# Patient Record
Sex: MALE | Race: WHITE | NOT HISPANIC OR LATINO | URBAN - METROPOLITAN AREA
[De-identification: names, ages, dates, MRNs, and addresses within clinical notes are randomized per-mention and may not be internally consistent; named-entity substitution may affect disease eponyms.]

---

## 2017-05-08 ENCOUNTER — OUTPATIENT (OUTPATIENT)
Dept: OUTPATIENT SERVICES | Facility: HOSPITAL | Age: 82
LOS: 1 days | Discharge: HOME | End: 2017-05-08

## 2017-06-28 DIAGNOSIS — N18.3 CHRONIC KIDNEY DISEASE, STAGE 3 (MODERATE): ICD-10-CM

## 2017-06-28 DIAGNOSIS — I10 ESSENTIAL (PRIMARY) HYPERTENSION: ICD-10-CM

## 2017-06-28 DIAGNOSIS — I25.10 ATHEROSCLEROTIC HEART DISEASE OF NATIVE CORONARY ARTERY WITHOUT ANGINA PECTORIS: ICD-10-CM

## 2017-12-04 ENCOUNTER — OUTPATIENT (OUTPATIENT)
Dept: OUTPATIENT SERVICES | Facility: HOSPITAL | Age: 82
LOS: 1 days | Discharge: HOME | End: 2017-12-04

## 2017-12-04 DIAGNOSIS — N18.3 CHRONIC KIDNEY DISEASE, STAGE 3 (MODERATE): ICD-10-CM

## 2017-12-04 DIAGNOSIS — I10 ESSENTIAL (PRIMARY) HYPERTENSION: ICD-10-CM

## 2017-12-04 DIAGNOSIS — I25.10 ATHEROSCLEROTIC HEART DISEASE OF NATIVE CORONARY ARTERY WITHOUT ANGINA PECTORIS: ICD-10-CM

## 2018-03-05 ENCOUNTER — OUTPATIENT (OUTPATIENT)
Dept: OUTPATIENT SERVICES | Facility: HOSPITAL | Age: 83
LOS: 1 days | Discharge: HOME | End: 2018-03-05

## 2018-03-05 DIAGNOSIS — I10 ESSENTIAL (PRIMARY) HYPERTENSION: ICD-10-CM

## 2018-03-10 ENCOUNTER — EMERGENCY (EMERGENCY)
Facility: HOSPITAL | Age: 83
LOS: 0 days | Discharge: HOME | End: 2018-03-10
Attending: EMERGENCY MEDICINE | Admitting: INTERNAL MEDICINE

## 2018-03-10 VITALS
RESPIRATION RATE: 20 BRPM | DIASTOLIC BLOOD PRESSURE: 70 MMHG | OXYGEN SATURATION: 100 % | TEMPERATURE: 96 F | HEART RATE: 69 BPM | SYSTOLIC BLOOD PRESSURE: 153 MMHG

## 2018-03-10 VITALS
RESPIRATION RATE: 18 BRPM | SYSTOLIC BLOOD PRESSURE: 143 MMHG | HEART RATE: 67 BPM | TEMPERATURE: 97 F | DIASTOLIC BLOOD PRESSURE: 65 MMHG | OXYGEN SATURATION: 99 %

## 2018-03-10 DIAGNOSIS — M25.551 PAIN IN RIGHT HIP: ICD-10-CM
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
## 2018-03-10 DIAGNOSIS — Z95.1 PRESENCE OF AORTOCORONARY BYPASS GRAFT: ICD-10-CM
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
## 2018-03-10 DIAGNOSIS — Z79.02 LONG TERM (CURRENT) USE OF ANTITHROMBOTICS/ANTIPLATELETS: ICD-10-CM

## 2018-03-10 DIAGNOSIS — I25.10 ATHEROSCLEROTIC HEART DISEASE OF NATIVE CORONARY ARTERY WITHOUT ANGINA PECTORIS: ICD-10-CM
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
## 2018-03-10 DIAGNOSIS — S20.211A CONTUSION OF RIGHT FRONT WALL OF THORAX, INITIAL ENCOUNTER: ICD-10-CM

## 2018-03-10 DIAGNOSIS — I10 ESSENTIAL (PRIMARY) HYPERTENSION: ICD-10-CM

## 2018-03-10 DIAGNOSIS — M25.519 PAIN IN UNSPECIFIED SHOULDER: ICD-10-CM

## 2018-03-10 DIAGNOSIS — S30.1XXA CONTUSION OF ABDOMINAL WALL, INITIAL ENCOUNTER: ICD-10-CM

## 2018-03-10 LAB
ALBUMIN SERPL ELPH-MCNC: 4.1 G/DL — SIGNIFICANT CHANGE UP (ref 3–5.5)
ALP SERPL-CCNC: 51 U/L — SIGNIFICANT CHANGE UP (ref 30–115)
ALT FLD-CCNC: 17 U/L — SIGNIFICANT CHANGE UP (ref 0–41)
ANION GAP SERPL CALC-SCNC: 8 MMOL/L — SIGNIFICANT CHANGE UP (ref 7–14)
APTT BLD: 25.5 SEC — LOW (ref 27–39.2)
AST SERPL-CCNC: 18 U/L — SIGNIFICANT CHANGE UP (ref 0–41)
BASOPHILS # BLD AUTO: 0.01 K/UL — SIGNIFICANT CHANGE UP (ref 0–0.2)
BASOPHILS NFR BLD AUTO: 0.1 % — SIGNIFICANT CHANGE UP (ref 0–1)
BILIRUB SERPL-MCNC: 0.7 MG/DL — SIGNIFICANT CHANGE UP (ref 0.2–1.2)
BUN SERPL-MCNC: 33 MG/DL — HIGH (ref 10–20)
CALCIUM SERPL-MCNC: 9.9 MG/DL — SIGNIFICANT CHANGE UP (ref 8.5–10.1)
CHLORIDE SERPL-SCNC: 107 MMOL/L — SIGNIFICANT CHANGE UP (ref 98–110)
CK MB BLD-MCNC: 2 % — SIGNIFICANT CHANGE UP (ref 0–4)
CK MB CFR SERPL CALC: 3.6 NG/ML — SIGNIFICANT CHANGE UP (ref 0.6–6.3)
CK SERPL-CCNC: 156 U/L — SIGNIFICANT CHANGE UP (ref 0–225)
CO2 SERPL-SCNC: 25 MMOL/L — SIGNIFICANT CHANGE UP (ref 17–32)
CREAT SERPL-MCNC: 1.7 MG/DL — HIGH (ref 0.7–1.5)
EOSINOPHIL # BLD AUTO: 0.11 K/UL — SIGNIFICANT CHANGE UP (ref 0–0.7)
EOSINOPHIL NFR BLD AUTO: 1.3 % — SIGNIFICANT CHANGE UP (ref 0–8)
ETHANOL SERPL-MCNC: <5 MG/DL — SIGNIFICANT CHANGE UP
GLUCOSE SERPL-MCNC: 95 MG/DL — SIGNIFICANT CHANGE UP (ref 70–110)
HCT VFR BLD CALC: 40.5 % — LOW (ref 42–52)
HGB BLD-MCNC: 13.9 G/DL — LOW (ref 14–18)
IMM GRANULOCYTES NFR BLD AUTO: 0.5 % — HIGH (ref 0.1–0.3)
INR BLD: 0.99 RATIO — SIGNIFICANT CHANGE UP (ref 0.65–1.3)
LACTATE SERPL-SCNC: 0.7 MMOL/L — SIGNIFICANT CHANGE UP (ref 0.5–2.2)
LIDOCAIN IGE QN: 15 U/L — SIGNIFICANT CHANGE UP (ref 7–60)
LYMPHOCYTES # BLD AUTO: 1.93 K/UL — SIGNIFICANT CHANGE UP (ref 1.2–3.4)
LYMPHOCYTES # BLD AUTO: 23.5 % — SIGNIFICANT CHANGE UP (ref 20.5–51.1)
MCHC RBC-ENTMCNC: 32.6 PG — HIGH (ref 27–31)
MCHC RBC-ENTMCNC: 34.3 G/DL — SIGNIFICANT CHANGE UP (ref 32–37)
MCV RBC AUTO: 95.1 FL — HIGH (ref 80–94)
MONOCYTES # BLD AUTO: 0.82 K/UL — HIGH (ref 0.1–0.6)
MONOCYTES NFR BLD AUTO: 10 % — HIGH (ref 1.7–9.3)
NEUTROPHILS # BLD AUTO: 5.32 K/UL — SIGNIFICANT CHANGE UP (ref 1.4–6.5)
NEUTROPHILS NFR BLD AUTO: 64.6 % — SIGNIFICANT CHANGE UP (ref 42.2–75.2)
PLATELET # BLD AUTO: 212 K/UL — SIGNIFICANT CHANGE UP (ref 130–400)
POTASSIUM SERPL-MCNC: 4.8 MMOL/L — SIGNIFICANT CHANGE UP (ref 3.5–5)
POTASSIUM SERPL-SCNC: 4.8 MMOL/L — SIGNIFICANT CHANGE UP (ref 3.5–5)
PROT SERPL-MCNC: 6.5 G/DL — SIGNIFICANT CHANGE UP (ref 6–8)
PROTHROM AB SERPL-ACNC: 10.7 SEC — SIGNIFICANT CHANGE UP (ref 9.95–12.87)
RBC # BLD: 4.26 M/UL — LOW (ref 4.7–6.1)
RBC # FLD: 13.9 % — SIGNIFICANT CHANGE UP (ref 11.5–14.5)
SODIUM SERPL-SCNC: 140 MMOL/L — SIGNIFICANT CHANGE UP (ref 135–146)
TROPONIN I SERPL-MCNC: <0.02 NG/ML — SIGNIFICANT CHANGE UP (ref 0–0.05)
TYPE + AB SCN PNL BLD: SIGNIFICANT CHANGE UP
WBC # BLD: 8.23 K/UL — SIGNIFICANT CHANGE UP (ref 4.8–10.8)
WBC # FLD AUTO: 8.23 K/UL — SIGNIFICANT CHANGE UP (ref 4.8–10.8)

## 2018-03-10 RX ORDER — SODIUM CHLORIDE 9 MG/ML
1000 INJECTION INTRAMUSCULAR; INTRAVENOUS; SUBCUTANEOUS ONCE
Qty: 0 | Refills: 0 | Status: COMPLETED | OUTPATIENT
Start: 2018-03-10 | End: 2018-03-10

## 2018-03-10 RX ADMIN — SODIUM CHLORIDE 500 MILLILITER(S): 9 INJECTION INTRAMUSCULAR; INTRAVENOUS; SUBCUTANEOUS at 14:19

## 2018-03-10 NOTE — ED PROVIDER NOTE - NS ED ROS FT
Constitutional: No fever, chills.  Eyes: No visual changes.  ENT: No hearing changes. No sore throat.  Neck: No neck pain or stiffness.  Cardiovascular: No chest pain, palpitations, edema.  Pulmonary: No SOB, cough. No hemoptysis.  Abdominal: No abdominal pain, nausea, vomiting, diarrhea.  : No dysuria, frequency.  Neuro: No headache, syncope, dizziness.  MS: No back pain. No calf pain/swelling. + rib pain., abdominal bruising.  Psych: No suicidal ideations.

## 2018-03-10 NOTE — ED ADULT NURSE NOTE - CHPI ED SYMPTOMS NEG
no fever/no abrasion/no tingling/no weakness/no vomiting/no bleeding/no confusion/no deformity/no loss of consciousness/no numbness

## 2018-03-10 NOTE — ED PROVIDER NOTE - PROGRESS NOTE DETAILS
I personally evaluated the patient. I reviewed the Resident’s or Physician Assistant’s note (as assigned above), and agree with the findings and plan except as documented in my note.     84 y/o M on Aspirin and Plavix presents with fall x2 days ago while accompanied by son.  Pt notes he was outside and fell onto R side, unclear whether he slipped and cannot remember if he hit head or passed out. As per son, thinks father might have slipped on snow.  Pt now c/o R sided chest wall pain and some R hip pain.  No headache, n/v, CP, abdominal pain, or back pain.  Pt denies dizziness or CP prior to fall but also cannot recall incident fully. PE: NAD. Head NCAT. EOMI, PERRL.  Neck supple. No midline C-spine tenderness. No midline T/L/S spinal tenderness. Lungs CTA. S1S2. TTP R anterior lateral chest wall. Large ecchymosis on L lateral chest and abdominal wall, mildly TTP.  Abdomen soft NTND.  Extremities no CCE.  Neuro non focal. A/P:  XRs, CT, labs, and reassess. Labs, imaging reviewed and discussed with patient. Will discharge pt home with PCP followup.

## 2018-03-10 NOTE — ED ADULT TRIAGE NOTE - CHIEF COMPLAINT QUOTE
patient reports mechanical fall 2 days ago. hitting right shoulder on head light of car. painful rom to shoulder no obvious deformity also complains of rib pain

## 2018-03-10 NOTE — ED ADULT NURSE NOTE - OBJECTIVE STATEMENT
patient presents to the ED s/p fall Thursday 3/8/2018, patient states " I was getting into my car when I don't know if I slipped and fell on the snow or what", patient presents with bruising to the left rib area and pain to the right upper chest. patient denies any LOC, patient on Plavix.

## 2018-03-10 NOTE — ED PROVIDER NOTE - OBJECTIVE STATEMENT
Pt is a 84 y/o male with hx of CAD s/p CABG on plavix, HTN, presents to ED s/p fall two days ago. Pt unsure if mechanical fall, but hit right side of ribs on car then fell to ground. Pt denies hitting head, denies LOC but does not remember entire episode. Pt has been ambulatory since then. Pt notes right sided rib pain since then as well as bruising to left abdomen. No neck pain, back pain.

## 2018-03-10 NOTE — ED PROVIDER NOTE - MEDICAL DECISION MAKING DETAILS
pt offered obs stay prior to d/c, but pt and son want to follow up with pt's pmd, return precautions discussed

## 2018-03-10 NOTE — ED PROVIDER NOTE - PHYSICAL EXAMINATION
Constitutional: Well developed, well nourished. NAD.  TRAUMA: ABC intact. GCS 15.   Head: Normocephalic, atraumatic.  Eyes: PERRL. EOMI. No Raccoon eyes.   ENT: No nasal discharge. No septal hematoma. No Bueno sign. Mucous membranes moist.  Neck: Supple. Painless ROM. No midline tenderness, stepoffs.  Cardiovascular: Normal S1, S2. Regular rate and rhythm. No murmurs, rubs, or gallops.  Pulmonary: Normal respiratory rate and effort. Lungs clear to auscultation bilaterally. No wheezing, rales, or rhonchi.  CHEST: + right upper lateral rib tenderness, without crepitus, flail chest.  Abdominal: Soft. Nondistended. Nontender. No rebound, guarding, rigidity. + ecchymosis over left abdomen.  BACK: No midline T/L/S tenderness, stepoffs. No saddle paresthesia.  Extremities. Pelvis stable. No traumatic deformities, tenderness of extremities.  Skin: No rashes, cyanosis, lacerations, abrasions.  Neuro: AAOx3. Strength 5/5 in all extremities. Sensation intact throughout. No focal neurological deficits.  Psych: Normal mood. Normal affect.

## 2018-04-07 ENCOUNTER — EMERGENCY (EMERGENCY)
Facility: HOSPITAL | Age: 83
LOS: 0 days | Discharge: HOME | End: 2018-04-07
Attending: EMERGENCY MEDICINE

## 2018-04-07 VITALS
HEART RATE: 61 BPM | RESPIRATION RATE: 18 BRPM | OXYGEN SATURATION: 98 % | WEIGHT: 149.91 LBS | DIASTOLIC BLOOD PRESSURE: 58 MMHG | TEMPERATURE: 97 F | SYSTOLIC BLOOD PRESSURE: 131 MMHG

## 2018-04-07 VITALS
OXYGEN SATURATION: 98 % | RESPIRATION RATE: 17 BRPM | DIASTOLIC BLOOD PRESSURE: 61 MMHG | HEART RATE: 68 BPM | TEMPERATURE: 97 F | SYSTOLIC BLOOD PRESSURE: 129 MMHG

## 2018-04-07 DIAGNOSIS — R53.1 WEAKNESS: ICD-10-CM

## 2018-04-07 DIAGNOSIS — Z79.899 OTHER LONG TERM (CURRENT) DRUG THERAPY: ICD-10-CM

## 2018-04-07 DIAGNOSIS — Z95.1 PRESENCE OF AORTOCORONARY BYPASS GRAFT: ICD-10-CM

## 2018-04-07 DIAGNOSIS — I25.10 ATHEROSCLEROTIC HEART DISEASE OF NATIVE CORONARY ARTERY WITHOUT ANGINA PECTORIS: ICD-10-CM

## 2018-04-07 DIAGNOSIS — Z98.890 OTHER SPECIFIED POSTPROCEDURAL STATES: ICD-10-CM

## 2018-04-07 DIAGNOSIS — R19.5 OTHER FECAL ABNORMALITIES: ICD-10-CM

## 2018-04-07 DIAGNOSIS — Z79.82 LONG TERM (CURRENT) USE OF ASPIRIN: ICD-10-CM

## 2018-04-07 LAB
ALBUMIN SERPL ELPH-MCNC: 4 G/DL — SIGNIFICANT CHANGE UP (ref 3.5–5.2)
ALP SERPL-CCNC: 65 U/L — SIGNIFICANT CHANGE UP (ref 30–115)
ALT FLD-CCNC: 16 U/L — SIGNIFICANT CHANGE UP (ref 0–41)
ANION GAP SERPL CALC-SCNC: 12 MMOL/L — SIGNIFICANT CHANGE UP (ref 7–14)
APPEARANCE UR: CLEAR — SIGNIFICANT CHANGE UP
APTT BLD: 24.8 SEC — LOW (ref 27–39.2)
AST SERPL-CCNC: 16 U/L — SIGNIFICANT CHANGE UP (ref 0–41)
BASOPHILS # BLD AUTO: 0.03 K/UL — SIGNIFICANT CHANGE UP (ref 0–0.2)
BASOPHILS NFR BLD AUTO: 0.4 % — SIGNIFICANT CHANGE UP (ref 0–1)
BILIRUB SERPL-MCNC: 0.4 MG/DL — SIGNIFICANT CHANGE UP (ref 0.2–1.2)
BILIRUB UR-MCNC: NEGATIVE — SIGNIFICANT CHANGE UP
BUN SERPL-MCNC: 36 MG/DL — HIGH (ref 10–20)
CALCIUM SERPL-MCNC: 9 MG/DL — SIGNIFICANT CHANGE UP (ref 8.5–10.1)
CHLORIDE SERPL-SCNC: 105 MMOL/L — SIGNIFICANT CHANGE UP (ref 98–110)
CK SERPL-CCNC: 110 U/L — SIGNIFICANT CHANGE UP (ref 0–225)
CO2 SERPL-SCNC: 24 MMOL/L — SIGNIFICANT CHANGE UP (ref 17–32)
COLOR SPEC: YELLOW — SIGNIFICANT CHANGE UP
CREAT SERPL-MCNC: 2 MG/DL — HIGH (ref 0.7–1.5)
DIFF PNL FLD: NEGATIVE — SIGNIFICANT CHANGE UP
EOSINOPHIL # BLD AUTO: 0.2 K/UL — SIGNIFICANT CHANGE UP (ref 0–0.7)
EOSINOPHIL NFR BLD AUTO: 2.6 % — SIGNIFICANT CHANGE UP (ref 0–8)
EPI CELLS # UR: (no result) /HPF
GLUCOSE SERPL-MCNC: 104 MG/DL — HIGH (ref 70–99)
GLUCOSE UR QL: NEGATIVE MG/DL — SIGNIFICANT CHANGE UP
HCT VFR BLD CALC: 36.7 % — LOW (ref 42–52)
HGB BLD-MCNC: 12.2 G/DL — LOW (ref 14–18)
IMM GRANULOCYTES NFR BLD AUTO: 0.4 % — HIGH (ref 0.1–0.3)
INR BLD: 0.96 RATIO — SIGNIFICANT CHANGE UP (ref 0.65–1.3)
KETONES UR-MCNC: NEGATIVE — SIGNIFICANT CHANGE UP
LACTATE SERPL-SCNC: 0.7 MMOL/L — SIGNIFICANT CHANGE UP (ref 0.5–2.2)
LEUKOCYTE ESTERASE UR-ACNC: NEGATIVE — SIGNIFICANT CHANGE UP
LYMPHOCYTES # BLD AUTO: 2.16 K/UL — SIGNIFICANT CHANGE UP (ref 1.2–3.4)
LYMPHOCYTES # BLD AUTO: 27.6 % — SIGNIFICANT CHANGE UP (ref 20.5–51.1)
MCHC RBC-ENTMCNC: 31.8 PG — HIGH (ref 27–31)
MCHC RBC-ENTMCNC: 33.2 G/DL — SIGNIFICANT CHANGE UP (ref 32–37)
MCV RBC AUTO: 95.6 FL — HIGH (ref 80–94)
MONOCYTES # BLD AUTO: 0.74 K/UL — HIGH (ref 0.1–0.6)
MONOCYTES NFR BLD AUTO: 9.5 % — HIGH (ref 1.7–9.3)
NEUTROPHILS # BLD AUTO: 4.66 K/UL — SIGNIFICANT CHANGE UP (ref 1.4–6.5)
NEUTROPHILS NFR BLD AUTO: 59.5 % — SIGNIFICANT CHANGE UP (ref 42.2–75.2)
NITRITE UR-MCNC: NEGATIVE — SIGNIFICANT CHANGE UP
NRBC # BLD: 0 /100 WBCS — SIGNIFICANT CHANGE UP (ref 0–0)
PH UR: 6.5 — SIGNIFICANT CHANGE UP (ref 5–8)
PLATELET # BLD AUTO: 170 K/UL — SIGNIFICANT CHANGE UP (ref 130–400)
POTASSIUM SERPL-MCNC: 4.6 MMOL/L — SIGNIFICANT CHANGE UP (ref 3.5–5)
POTASSIUM SERPL-SCNC: 4.6 MMOL/L — SIGNIFICANT CHANGE UP (ref 3.5–5)
PROT SERPL-MCNC: 6.2 G/DL — SIGNIFICANT CHANGE UP (ref 6–8)
PROT UR-MCNC: (no result) MG/DL
PROTHROM AB SERPL-ACNC: 10.4 SEC — SIGNIFICANT CHANGE UP (ref 9.95–12.87)
RBC # BLD: 3.84 M/UL — LOW (ref 4.7–6.1)
RBC # FLD: 13.8 % — SIGNIFICANT CHANGE UP (ref 11.5–14.5)
SODIUM SERPL-SCNC: 141 MMOL/L — SIGNIFICANT CHANGE UP (ref 135–146)
SP GR SPEC: 1.02 — SIGNIFICANT CHANGE UP (ref 1.01–1.03)
TROPONIN T SERPL-MCNC: <0.01 NG/ML — SIGNIFICANT CHANGE UP
UROBILINOGEN FLD QL: 0.2 MG/DL — SIGNIFICANT CHANGE UP (ref 0.2–0.2)
WBC # BLD: 7.82 K/UL — SIGNIFICANT CHANGE UP (ref 4.8–10.8)
WBC # FLD AUTO: 7.82 K/UL — SIGNIFICANT CHANGE UP (ref 4.8–10.8)

## 2018-04-07 RX ORDER — SODIUM CHLORIDE 9 MG/ML
1000 INJECTION INTRAMUSCULAR; INTRAVENOUS; SUBCUTANEOUS ONCE
Qty: 0 | Refills: 0 | Status: COMPLETED | OUTPATIENT
Start: 2018-04-07 | End: 2018-04-07

## 2018-04-07 RX ORDER — NITROGLYCERIN 6.5 MG
1 CAPSULE, EXTENDED RELEASE ORAL
Qty: 0 | Refills: 0 | COMMUNITY

## 2018-04-07 RX ORDER — CARVEDILOL PHOSPHATE 80 MG/1
1 CAPSULE, EXTENDED RELEASE ORAL
Qty: 0 | Refills: 0 | COMMUNITY

## 2018-04-07 RX ADMIN — SODIUM CHLORIDE 2000 MILLILITER(S): 9 INJECTION INTRAMUSCULAR; INTRAVENOUS; SUBCUTANEOUS at 04:31

## 2018-04-07 NOTE — ED ADULT NURSE NOTE - CHPI ED SYMPTOMS NEG
no dizziness/no numbness/no pain/no decreased eating/drinking/no vomiting/no chills/no tingling/no nausea/no fever

## 2018-04-07 NOTE — ED PROVIDER NOTE - PHYSICAL EXAMINATION
CONSTITUTIONAL: Well-developed; well-nourished; in no acute distress.   SKIN: warm, dry  HEAD: Normocephalic; atraumatic.  EYES: PERRL, EOMI, normal sclera and conjunctiva   ENT: No nasal discharge; airway clear.  NECK: Supple; non tender.  CARD: S1, S2 normal; no murmurs, gallops, or rubs. Regular rate and rhythm.   RESP: No wheezes, rales or rhonchi.  ABD: soft ntnd. No rebound, guarding. Guaiac negative. Bowel sounds normal.  EXT: Normal ROM.  No clubbing, cyanosis or edema.   LYMPH: No acute cervical adenopathy.  NEURO: Alert, oriented, grossly unremarkable  PSYCH: Cooperative, appropriate.

## 2018-04-07 NOTE — ED PROVIDER NOTE - NS ED ROS FT
Eyes:  No visual changes, eye pain or discharge.  ENMT:  No hearing changes, pain, discharge or infections. No neck pain or stiffness.  Cardiac:  No chest pain, SOB or edema.   Respiratory:  No cough or respiratory distress.   GI:  Dark stools. No nausea, vomiting, diarrhea or abdominal pain.  :  No dysuria, frequency or burning.  MS:  No myalgia, muscle weakness, joint pain or back pain.  Neuro:  Generalized weakness. No headache.  No LOC.  Skin:  No skin rash.

## 2018-04-07 NOTE — ED PROVIDER NOTE - OBJECTIVE STATEMENT
85 y m pmh CAD s/p cabg, hernia repair BL, bph pw weakness. Generalized weakness and dizziness when he got up 1 hour pta. Noticed 2 episodes of dark stools today. Currently no complaints, dizziness and weakness resolved. Denies abdominal pain, dysuria, cp, sob, diarrhea, constipation, hematochezia, back pain, flank pain, hematuria.

## 2018-04-07 NOTE — ED PROVIDER NOTE - ATTENDING CONTRIBUTION TO CARE
pt co dark stools. non bloody. not black but darker than usual. no ab pain, fever, chills, n, v, d. pt in nad, well appearing, pink conj, neck sup, ctab, rrr, ab soft, nt, nd. brown stool, guiac +. will check labs. pt co transient weak/dizzy, which resolved,  dark stools. non bloody. not black but darker than usual. no ab pain, fever, chills, n, v, d. pt in nad, well appearing, pink conj, neck sup, ctab, rrr, ab soft, nt, nd. brown stool, guiac +. will check labs.

## 2018-04-07 NOTE — ED PROVIDER NOTE - PROGRESS NOTE DETAILS
Pt feeling better, asking to go home. Informed pt labs were negative. Will give GI contact information, informed patient that if he has repeat episodes of dark stool or bright red blood to return.

## 2018-04-08 LAB
CULTURE RESULTS: SIGNIFICANT CHANGE UP
SPECIMEN SOURCE: SIGNIFICANT CHANGE UP

## 2018-08-08 PROBLEM — Z00.00 ENCOUNTER FOR PREVENTIVE HEALTH EXAMINATION: Status: ACTIVE | Noted: 2018-08-08

## 2018-09-07 ENCOUNTER — APPOINTMENT (OUTPATIENT)
Dept: UROLOGY | Facility: CLINIC | Age: 83
End: 2018-09-07
Payer: MEDICARE

## 2018-09-07 VITALS
WEIGHT: 160 LBS | DIASTOLIC BLOOD PRESSURE: 65 MMHG | BODY MASS INDEX: 23.7 KG/M2 | SYSTOLIC BLOOD PRESSURE: 118 MMHG | HEART RATE: 54 BPM | HEIGHT: 69 IN

## 2018-09-07 DIAGNOSIS — R35.0 FREQUENCY OF MICTURITION: ICD-10-CM

## 2018-09-07 DIAGNOSIS — F03.90 UNSPECIFIED DEMENTIA W/OUT BEHAVIORAL DISTURBANCE: ICD-10-CM

## 2018-09-07 DIAGNOSIS — Z63.4 DISAPPEARANCE AND DEATH OF FAMILY MEMBER: ICD-10-CM

## 2018-09-07 LAB
BILIRUB UR QL STRIP: NORMAL
CLARITY UR: CLEAR
COLLECTION METHOD: NORMAL
GLUCOSE UR-MCNC: NORMAL
HCG UR QL: NORMAL EU/DL
HGB UR QL STRIP.AUTO: NORMAL
KETONES UR-MCNC: NORMAL
LEUKOCYTE ESTERASE UR QL STRIP: NORMAL
NITRITE UR QL STRIP: NORMAL
PH UR STRIP: 5
PROT UR STRIP-MCNC: NORMAL
SP GR UR STRIP: 1.01

## 2018-09-07 PROCEDURE — 99213 OFFICE O/P EST LOW 20 MIN: CPT

## 2018-09-07 PROCEDURE — 51798 US URINE CAPACITY MEASURE: CPT

## 2018-09-07 PROCEDURE — 81003 URINALYSIS AUTO W/O SCOPE: CPT | Mod: QW

## 2018-09-07 RX ORDER — SILODOSIN 4 MG/1
4 CAPSULE ORAL DAILY
Qty: 90 | Refills: 3 | Status: ACTIVE | COMMUNITY
Start: 2018-09-07 | End: 1900-01-01

## 2018-09-07 RX ORDER — SILODOSIN 4 MG/1
4 CAPSULE ORAL
Refills: 0 | Status: ACTIVE | COMMUNITY

## 2018-09-07 RX ORDER — CHOLECALCIFEROL (VITAMIN D3) 50 MCG
2000 CAPSULE ORAL
Refills: 0 | Status: ACTIVE | COMMUNITY

## 2018-09-07 RX ORDER — LISINOPRIL 20 MG/1
20 TABLET ORAL
Refills: 0 | Status: ACTIVE | COMMUNITY

## 2018-09-07 SDOH — SOCIAL STABILITY - SOCIAL INSECURITY: DISSAPEARANCE AND DEATH OF FAMILY MEMBER: Z63.4

## 2018-12-28 ENCOUNTER — EMERGENCY (EMERGENCY)
Facility: HOSPITAL | Age: 83
LOS: 0 days | Discharge: HOME | End: 2018-12-28
Attending: EMERGENCY MEDICINE | Admitting: EMERGENCY MEDICINE
Payer: MEDICARE

## 2018-12-28 VITALS
SYSTOLIC BLOOD PRESSURE: 120 MMHG | OXYGEN SATURATION: 99 % | DIASTOLIC BLOOD PRESSURE: 60 MMHG | RESPIRATION RATE: 18 BRPM | TEMPERATURE: 97 F | HEART RATE: 54 BPM

## 2018-12-28 VITALS
SYSTOLIC BLOOD PRESSURE: 121 MMHG | RESPIRATION RATE: 18 BRPM | TEMPERATURE: 98 F | DIASTOLIC BLOOD PRESSURE: 62 MMHG | OXYGEN SATURATION: 99 % | HEART RATE: 55 BPM
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
## 2018-12-28 DIAGNOSIS — I25.10 ATHEROSCLEROTIC HEART DISEASE OF NATIVE CORONARY ARTERY WITHOUT ANGINA PECTORIS: ICD-10-CM

## 2018-12-28 DIAGNOSIS — N40.0 BENIGN PROSTATIC HYPERPLASIA WITHOUT LOWER URINARY TRACT SYMPTOMS: ICD-10-CM

## 2018-12-28 DIAGNOSIS — Z98.890 OTHER SPECIFIED POSTPROCEDURAL STATES: Chronic | ICD-10-CM

## 2018-12-28 DIAGNOSIS — S32.008A OTHER FRACTURE OF UNSPECIFIED LUMBAR VERTEBRA, INITIAL ENCOUNTER FOR CLOSED FRACTURE: ICD-10-CM

## 2018-12-28 DIAGNOSIS — F03.90 UNSPECIFIED DEMENTIA WITHOUT BEHAVIORAL DISTURBANCE: ICD-10-CM

## 2018-12-28 DIAGNOSIS — Z95.1 PRESENCE OF AORTOCORONARY BYPASS GRAFT: ICD-10-CM
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
## 2018-12-28 DIAGNOSIS — I10 ESSENTIAL (PRIMARY) HYPERTENSION: ICD-10-CM
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
## 2018-12-28 DIAGNOSIS — S22.39XA FRACTURE OF ONE RIB, UNSPECIFIED SIDE, INITIAL ENCOUNTER FOR CLOSED FRACTURE: ICD-10-CM

## 2018-12-28 DIAGNOSIS — Z96.659 PRESENCE OF UNSPECIFIED ARTIFICIAL KNEE JOINT: Chronic | ICD-10-CM

## 2018-12-28 DIAGNOSIS — Z95.1 PRESENCE OF AORTOCORONARY BYPASS GRAFT: Chronic | ICD-10-CM

## 2018-12-28 DIAGNOSIS — M54.9 DORSALGIA, UNSPECIFIED: ICD-10-CM
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
## 2018-12-28 DIAGNOSIS — S30.1XXA CONTUSION OF ABDOMINAL WALL, INITIAL ENCOUNTER: ICD-10-CM

## 2018-12-28 LAB
ALBUMIN SERPL ELPH-MCNC: 4.5 G/DL — SIGNIFICANT CHANGE UP (ref 3.5–5.2)
ALP SERPL-CCNC: 75 U/L — SIGNIFICANT CHANGE UP (ref 30–115)
ALT FLD-CCNC: 17 U/L — SIGNIFICANT CHANGE UP (ref 0–41)
ANION GAP SERPL CALC-SCNC: 15 MMOL/L — HIGH (ref 7–14)
APTT BLD: 27.9 SEC — SIGNIFICANT CHANGE UP (ref 27–39.2)
AST SERPL-CCNC: 17 U/L — SIGNIFICANT CHANGE UP (ref 0–41)
BASE EXCESS BLDV CALC-SCNC: -1.3 MMOL/L — SIGNIFICANT CHANGE UP (ref -2–2)
BASOPHILS # BLD AUTO: 0.03 K/UL — SIGNIFICANT CHANGE UP (ref 0–0.2)
BASOPHILS NFR BLD AUTO: 0.5 % — SIGNIFICANT CHANGE UP (ref 0–1)
BILIRUB SERPL-MCNC: 0.5 MG/DL — SIGNIFICANT CHANGE UP (ref 0.2–1.2)
BUN SERPL-MCNC: 40 MG/DL — HIGH (ref 10–20)
CA-I SERPL-SCNC: 1.23 MMOL/L — SIGNIFICANT CHANGE UP (ref 1.12–1.3)
CALCIUM SERPL-MCNC: 9.5 MG/DL — SIGNIFICANT CHANGE UP (ref 8.5–10.1)
CHLORIDE SERPL-SCNC: 105 MMOL/L — SIGNIFICANT CHANGE UP (ref 98–110)
CO2 SERPL-SCNC: 23 MMOL/L — SIGNIFICANT CHANGE UP (ref 17–32)
CREAT SERPL-MCNC: 1.9 MG/DL — HIGH (ref 0.7–1.5)
EOSINOPHIL # BLD AUTO: 0.11 K/UL — SIGNIFICANT CHANGE UP (ref 0–0.7)
EOSINOPHIL NFR BLD AUTO: 1.7 % — SIGNIFICANT CHANGE UP (ref 0–8)
GAS PNL BLDV: 142 MMOL/L — SIGNIFICANT CHANGE UP (ref 136–145)
GAS PNL BLDV: SIGNIFICANT CHANGE UP
GLUCOSE SERPL-MCNC: 96 MG/DL — SIGNIFICANT CHANGE UP (ref 70–99)
HCO3 BLDV-SCNC: 24 MMOL/L — SIGNIFICANT CHANGE UP (ref 22–29)
HCT VFR BLD CALC: 36.6 % — LOW (ref 42–52)
HCT VFR BLDA CALC: 38 % — SIGNIFICANT CHANGE UP (ref 34–44)
HGB BLD CALC-MCNC: 12.4 G/DL — LOW (ref 14–18)
HGB BLD-MCNC: 12.3 G/DL — LOW (ref 14–18)
IMM GRANULOCYTES NFR BLD AUTO: 0.6 % — HIGH (ref 0.1–0.3)
INR BLD: 0.96 RATIO — SIGNIFICANT CHANGE UP (ref 0.65–1.3)
LACTATE BLDV-MCNC: 0.8 MMOL/L — SIGNIFICANT CHANGE UP (ref 0.5–1.6)
LIDOCAIN IGE QN: 57 U/L — SIGNIFICANT CHANGE UP (ref 7–60)
LYMPHOCYTES # BLD AUTO: 1.9 K/UL — SIGNIFICANT CHANGE UP (ref 1.2–3.4)
LYMPHOCYTES # BLD AUTO: 28.7 % — SIGNIFICANT CHANGE UP (ref 20.5–51.1)
MCHC RBC-ENTMCNC: 32.3 PG — HIGH (ref 27–31)
MCHC RBC-ENTMCNC: 33.6 G/DL — SIGNIFICANT CHANGE UP (ref 32–37)
MCV RBC AUTO: 96.1 FL — HIGH (ref 80–94)
MONOCYTES # BLD AUTO: 0.67 K/UL — HIGH (ref 0.1–0.6)
MONOCYTES NFR BLD AUTO: 10.1 % — HIGH (ref 1.7–9.3)
NEUTROPHILS # BLD AUTO: 3.87 K/UL — SIGNIFICANT CHANGE UP (ref 1.4–6.5)
NEUTROPHILS NFR BLD AUTO: 58.4 % — SIGNIFICANT CHANGE UP (ref 42.2–75.2)
NRBC # BLD: 0 /100 WBCS — SIGNIFICANT CHANGE UP (ref 0–0)
PCO2 BLDV: 44 MMHG — SIGNIFICANT CHANGE UP (ref 41–51)
PH BLDV: 7.35 — SIGNIFICANT CHANGE UP (ref 7.26–7.43)
PLATELET # BLD AUTO: 171 K/UL — SIGNIFICANT CHANGE UP (ref 130–400)
PO2 BLDV: 27 MMHG — SIGNIFICANT CHANGE UP (ref 20–40)
POTASSIUM BLDV-SCNC: 4.8 MMOL/L — SIGNIFICANT CHANGE UP (ref 3.3–5.6)
POTASSIUM SERPL-MCNC: 5.9 MMOL/L — HIGH (ref 3.5–5)
POTASSIUM SERPL-SCNC: 5.9 MMOL/L — HIGH (ref 3.5–5)
PROT SERPL-MCNC: 6.9 G/DL — SIGNIFICANT CHANGE UP (ref 6–8)
PROTHROM AB SERPL-ACNC: 11.1 SEC — SIGNIFICANT CHANGE UP (ref 9.95–12.87)
RBC # BLD: 3.81 M/UL — LOW (ref 4.7–6.1)
RBC # FLD: 14 % — SIGNIFICANT CHANGE UP (ref 11.5–14.5)
SAO2 % BLDV: 47 % — SIGNIFICANT CHANGE UP
SODIUM SERPL-SCNC: 143 MMOL/L — SIGNIFICANT CHANGE UP (ref 135–146)
TROPONIN T SERPL-MCNC: <0.01 NG/ML — SIGNIFICANT CHANGE UP
WBC # BLD: 6.62 K/UL — SIGNIFICANT CHANGE UP (ref 4.8–10.8)
WBC # FLD AUTO: 6.62 K/UL — SIGNIFICANT CHANGE UP (ref 4.8–10.8)

## 2018-12-28 PROCEDURE — 93010 ELECTROCARDIOGRAM REPORT: CPT

## 2018-12-28 RX ORDER — ACETAMINOPHEN 500 MG
975 TABLET ORAL ONCE
Qty: 0 | Refills: 0 | Status: COMPLETED | OUTPATIENT
Start: 2018-12-28 | End: 2018-12-28

## 2018-12-28 RX ADMIN — Medication 975 MILLIGRAM(S): at 13:30

## 2018-12-28 NOTE — ED ADULT NURSE NOTE - NSIMPLEMENTINTERV_GEN_ALL_ED
Implemented All Fall with Harm Risk Interventions:  [place] to call system. Call bell, personal items and telephone within reach. Instruct patient to call for assistance. Room bathroom lighting operational. Non-slip footwear when patient is off stretcher. Physically safe environment: no spills, clutter or unnecessary equipment. Stretcher in lowest position, wheels locked, appropriate side rails in place. Provide visual cue, wrist band, yellow gown, etc. Monitor gait and stability. Monitor for mental status changes and reorient to person, place, and time. Review medications for side effects contributing to fall risk. Reinforce activity limits and safety measures with patient and family. Provide visual clues: red socks.

## 2018-12-28 NOTE — ED PROVIDER NOTE - NSFOLLOWUPCLINICS_GEN_ALL_ED_FT
[facility]  Rehabilitation  [address] [place] [address]  Phone: [phone]  Fax:   Follow Up Time: 1-3 Days

## 2018-12-28 NOTE — ED PROVIDER NOTE - NS ED ROS FT
Constitutional: See HPI.  Eyes: No visual changes, eye pain or discharge. No Photophobia  ENMT: No hearing changes, pain, discharge or infections. No neck pain or stiffness. No limited ROM  Cardiac: No SOB or edema. No chest pain with exertion.  Respiratory: No cough or respiratory distress. No hemoptysis. No history of asthma or RAD.  GI: No nausea, vomiting, diarrhea or abdominal pain.  : No dysuria, frequency or burning. No Discharge  MS: + flank ecchymosis, +back pain, + radiculopathy. No myalgia, muscle weakness, joint pain or back pain.  Neuro: No headache or weakness. No LOC.  Skin: No skin rash.  Except as documented in the HPI, all other systems are negative.

## 2018-12-28 NOTE — CONSULT NOTE ADULT - ATTENDING COMMENTS
as above
85 yo male patient  s/p fall, GCS 15, no LOC  on ASA.  rib fracture and left transverse processes as stated above.    No other acute trauma injury.  I went to evaluate the patient in the ED and the ED department had already sent the patient home.

## 2018-12-28 NOTE — ED ADULT NURSE REASSESSMENT NOTE - NS ED NURSE REASSESS COMMENT FT1
PT CALM AND COMFORTABLE AT THIS TIME. NO ACUTE DISTRESS NOTED. PT STABLE. NO C/O OF PAIN NOTED. SAFETY AND COMFORT MAINTAINED. PT UPDATED WITH POC. WILL CONT TO MONITOR

## 2018-12-28 NOTE — CONSULT NOTE ADULT - SUBJECTIVE AND OBJECTIVE BOX
Trauma Surgery Consultation Note    TRAUMA ACTIVATION LEVEL:  Trauma consult    MECHANISM OF INJURY:      [x] Blunt  	[] MVC	[x] Fall	[] Pedestrian Struck	[] Motorcycle   [] Assault   [] Bicycle collision  [] Sports injury     [] Penetrating  	[] Gun Shot Wound 		[] Stab Wound    GCS: 15/15 	E: 4/4	V: 5/5	M: 6/6    85 yo male with PMHx of CAD s/p CABG on ASA/plavix, hernia repair, BPH, HTN, LKTR presented to ED for the evaluation of back pain x 1 week. Pt had mechanical trip and fall a week ago while trying to get out of chair and fell on left flank with -LOC, -HT. Since then he is experiencing left lumbar pain radiating down left leg which is worse with ROM and/or ambulation and alleviated with rest and tylenol. Denies abdominal pain, N/V, chest pain, dyspnea, pleuritic chest pain, urinary complaint. Per family mild dementia. Pulling 2L on IS.       PAST MEDICAL & SURGICAL HISTORY:  BPH (benign prostatic hyperplasia)  HLD (hyperlipidemia)  HTN (hypertension)  CAD (coronary artery disease)  History of total knee replacement  H/O hernia repair  S/P CABG (coronary artery bypass graft)    Home Meds: Home Medications:  aspirin 81 mg oral tablet: 1 tab(s) orally once a day (07 Apr 2018 05:01)  carvedilol 12.5 mg oral tablet: 1 tab(s) orally 2 times a day (07 Apr 2018 05:01)  clopidogrel 75 mg oral tablet: 1 tab(s) orally once a day (07 Apr 2018 05:01)  ezetimibe-simvastatin 10 mg-40 mg oral tablet: 1 tab(s) orally once a day (07 Apr 2018 05:01)  lisinopril 20 mg oral tablet: 1 tab(s) orally once a day (07 Apr 2018 05:01)  Nitrostat 0.4 mg sublingual tablet: 1 tab(s) sublingual every 5 minutes, As Needed (07 Apr 2018 05:01)  Rapaflo 4 mg oral capsule: 1 cap(s) orally once a day (07 Apr 2018 05:01)  Zantac 150 oral tablet: 1 tab(s) orally 2 times a day (07 Apr 2018 05:01)    Allergies: Allergies  No Known Allergies    Intolerances  None recorded.    Soc:   Advanced Directives: Presumed Full Code     ROS:    REVIEW OF SYSTEMS    [x] A ten-point review of systems was otherwise negative except as noted.  [ ] Due to altered mental status/intubation, subjective information were not able to be obtained from the patient. History was obtained, to the extent possible, from review of the chart and collateral sources of information.    --------------------------------------------------------------------------------------  VITAL SIGNS, INS/OUTS (last 24 hours):  --------------------------------------------------------------------------------------  ICU Vital Signs Last 24 Hrs  T(C): 36.6 (28 Dec 2018 18:00), Max: 36.6 (28 Dec 2018 18:00)  T(F): 97.8 (28 Dec 2018 18:00), Max: 97.8 (28 Dec 2018 18:00)  HR: 55 (28 Dec 2018 18:00) (54 - 55)  BP: 121/62 (28 Dec 2018 18:00) (120/60 - 121/62)  RR: 18 (28 Dec 2018 18:00) (18 - 18)  SpO2: 99% (28 Dec 2018 18:00) (99% - 99%)    I&O's Summary    --------------------------------------------------------------------------------------  PHYSICAL EXAM    General: NAD, AAOx3, calm and cooperative  HEENT: NCAT, Trachea ML, Neck supple  Cardiac: RRR S1, S2  Respiratory: CTAB, normal respiratory effort, breath sounds equal BL, bruise on L posterior chest  Abdomen: Soft, non-distended, non-tender, +bowel sounds,   Musculoskeletal: WNL  Neuro: Grossly intact  Vascular: Pulses 2+ throughout, extremities well perfused  Skin: Warm/dry, normal color, no jaundice    LABS  --------------------------------------------------------------------------------------  Labs:  CAPILLARY BLOOD GLUCOSE                          12.3   6.62  )-----------( 171      ( 28 Dec 2018 15:00 )             36.6       Auto Neutrophil %: 58.4 % (12-28-18 @ 15:00)  Auto Immature Granulocyte %: 0.6 % (12-28-18 @ 15:00)    12-28    143  |  105  |  40<H>  ----------------------------<  96  5.9<H>   |  23  |  1.9<H>      Calcium, Total Serum: 9.5 mg/dL (12-28-18 @ 15:00)      LFTs:             6.9  | 0.5  | 17       ------------------[75      ( 28 Dec 2018 15:00 )  4.5  | x    | 17          Lipase:57     Amylase:x         Blood Gas Venous - Lactate: 0.8 mmoL/L (12-28-18 @ 17:33)      Coags:     11.10  ----< 0.96    ( 28 Dec 2018 15:00 )     27.9        CARDIAC MARKERS ( 28 Dec 2018 14:42 )  x     / <0.01 ng/mL / x     / x     / x          IMAGING RESULTS  < from: CT Abdomen and Pelvis No Cont (12.28.18 @ 16:54) >  IMPRESSION:   1. Acute fractures of the left transverse processes of L2, L3, and L4.  Likely subacute fracture of the left ninth posterior rib.  2. Otherwise, no acute traumatic injury to the chest, abdomen, or pelvis.  3. 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 CT follow-up isrecommended in 6-12 months.  4. Cholelithiasis.    < from: CT Cervical Spine No Cont (12.28.18 @ 16:47) >  IMPRESSION:    1.  No evidence of acute cervical spine fracture or subluxation.  2.  Diffuse osteopenia and stable degenerative changes.      < from: CT Head No Cont (12.28.18 @ 16:46) >  IMPRESSION:   1.  No evidence of acute intracranial pathology.  Stable exam since 3/10/2018  2.  Stable mild chronic microvascular changes.

## 2018-12-28 NOTE — ED PROVIDER NOTE - CARE PLAN
Principal Discharge DX:	Fracture of transverse process of lumbar vertebra  Secondary Diagnosis:	Rib fracture  Secondary Diagnosis:	Contusion Principal Discharge DX:	Fracture of transverse process of lumbar vertebra  Secondary Diagnosis:	Rib fracture  Secondary Diagnosis:	Contusion  Secondary Diagnosis:	Fall, initial encounter

## 2018-12-28 NOTE — ED ADULT NURSE NOTE - OBJECTIVE STATEMENT
PT S/P FALL 1 WEEK AGO AND WORSENING L LOWER BACK PAIN RADIATING TO LLE. PT STATES SIGHT RELIEF WITH TYLENOL AT HOME. (-) HEAD TRAUMA. (-) LOC.

## 2018-12-28 NOTE — ED PROVIDER NOTE - ATTENDING CONTRIBUTION TO CARE
86m w a hx of BPH, HTN, CAD/CABG, & mild dementia presents after mechanical fall last week while getting out of a chair. Son heard him fall and at side shortly after. Fall was unwitnessed. Pt still having L flank pain so presented for eval. Pain is sharp, moderate, constant, no radiating, worse w moving. Pt on ASA/Plavix.    Review of Systems  Constitutional:  No fever or chills.   Eyes:  Negative.   ENMT:  No nasal congestion, discharge, or throat pain.   Cardiac:  No chest pain, syncope, or edema.  Respiratory:  No dyspnea, wheezing, or cough. No hemoptysis.  GI:  No vomiting, diarrhea, or abdominal pain. No melena or hematochezia.  :  No dysuria or hematuria.   Musculoskeletal:  No joint swelling or joint pain. +Back pain.  Skin:  No skin rash, jaundice, or lesions. +L flank bruising.  Neuro:  No headache, loss of sensation, or focal weakness.  No change in mental status.     Physical Exam  General: Awake, alert, NAD, elderly/frail, non-toxic appearing, NCAT  Eyes: PERRL, EOMI, no icterus, lids and conjunctivae are normal  ENT: External inspection normal, pink/moist membranes, pharynx normal  CV: S1S2, regular rate and rhythm, no murmur/gallops/rubs, no JVD, 2+ pulses b/l, no edema/cords/homans, warm/well-perfused  Respiratory: Normal respiratory rate/effort, no respiratory distress, normal voice, speaking full sentences, lungs clear to auscultation b/l, no wheezing/rales/rhonchi, no retractions, no stridor  Abdomen: Soft abdomen, no tender/distended/guarding/rebound, no CVA tender  Musculoskeletal: FROM all 4 extremities, N/V intact, pelvis stable, +Lumbar spinal/paraspinal tender, no deform/step-offs, no TS spinal tender/deform/step-offs, no stefan tender/deform  Neck: FROM neck, supple, no meningismus, trachea midline, no JVD, no cspine tender/step-offs  Integumentary: Color normal for race, warm and dry, no rash. L flank ecchymosis  Neuro: Alert/interactive, CN 2-12 grossly intact, normal motor, normal sensory    86m w fall 1 wk ago, on ASA/Plavix w L flank ecchymosis and persistent pain. nontoxic appearing, n/v intact. --Labs, EKG, CT's, observe/re-assess, will discuss w Trauma as needed 86m w a hx of BPH, HTN, CAD/CABG, & mild dementia presents after mechanical fall last week while getting out of a chair. Son heard him fall and at side shortly after. Fall was unwitnessed. Pt still having L flank pain so presented for eval. Pain is sharp, moderate, constant, no radiating, worse w moving. Pt on ASA/Plavix.    Review of Systems  Constitutional:  No fever or chills.   Eyes:  Negative.   ENMT:  No nasal congestion, discharge, or throat pain.   Cardiac:  No chest pain, syncope, or edema.  Respiratory:  No dyspnea, wheezing, or cough. No hemoptysis.  GI:  No vomiting, diarrhea, or abdominal pain. No melena or hematochezia.  :  No dysuria or hematuria.   Musculoskeletal:  No joint swelling or joint pain. +Back pain.  Skin:  No skin rash, jaundice, or lesions. +L flank bruising.  Neuro:  No headache, loss of sensation, or focal weakness.  No change in mental status.     Physical Exam  General: Awake, alert, NAD, elderly/frail, non-toxic appearing, NCAT  Eyes: PERRL, EOMI, no icterus, lids and conjunctivae are normal  ENT: External inspection normal, pink/moist membranes, pharynx normal  CV: S1S2, regular rate and rhythm, no murmur/gallops/rubs, no JVD, 2+ pulses b/l, no edema/cords/homans, warm/well-perfused  Respiratory: Normal respiratory rate/effort, no respiratory distress, normal voice, speaking full sentences, lungs clear to auscultation b/l, no wheezing/rales/rhonchi, no retractions, no stridor  Abdomen: Soft abdomen, no tender/distended/guarding/rebound, no CVA tender  Musculoskeletal: FROM all 4 extremities, N/V intact, pelvis stable, +Lumbar spinal/paraspinal tender, no deform/step-offs, no TS spinal tender/deform/step-offs, no other stefan tender/deform  Neck: FROM neck, supple, no meningismus, trachea midline, no JVD, no cspine tender/step-offs  Integumentary: Color normal for race, warm and dry, no rash. L flank ecchymosis  Neuro: Alert/interactive, CN 2-12 intact, normal motor, normal sensory, no saddle anesthesia, normal strength/sensation including distal toes b/l    86m w fall 1 wk ago, on ASA/Plavix w L flank ecchymosis and persistent pain. nontoxic appearing, n/v intact. --Labs, EKG, CT's, observe/re-assess, will discuss w Trauma as needed

## 2018-12-28 NOTE — ED PROVIDER NOTE - OBJECTIVE STATEMENT
86 y.o male with hx of CAD s/p CABG on ASA, hernia repair, BPH, HTN, LTKR presents to the ED for evaluation of back pain x 1 week.  Pt had mechanical trip/fall trying to get out of chair and fell on left flank with no head injury, LOC, neck pain.  Since then experiencing left lumbar pain radiating down left leg which is worse with ROM/ambulation and alleviated with rest/tylenol.  Pt concerned about duration of sxs prompting visit to the ED.  Denies abdominal pain, N/V, chest pain, dyspnea, pleuritic chest pain, urinary sxs.  Per family mild dementia. 86 y.o male with hx of CAD s/p CABG on ASA, hernia repair, BPH, HTN, LKTR presents to the ED for evaluation of back pain x 1 week.  Pt had mechanical trip/fall trying to get out of chair and fell on left flank with no head injury, LOC, neck pain.  Since then experiencing left lumbar pain radiating down left leg which is worse with ROM/ambulation and alleviated with rest/tylenol.  Pt concerned about duration of sxs prompting visit to the ED.  Denies abdominal pain, N/V, chest pain, dyspnea, pleuritic chest pain, urinary sxs.  Per family mild dementia. 86 y.o male with hx of CAD s/p CABG on ASA/plavix, hernia repair, BPH, HTN, LKTR presents to the ED for evaluation of back pain x 1 week.  Pt had mechanical trip/fall trying to get out of chair and fell on left flank with no head injury, LOC, neck pain.  Since then experiencing left lumbar pain radiating down left leg which is worse with ROM/ambulation and alleviated with rest/tylenol.  Pt concerned about duration of sxs prompting visit to the ED.  Denies abdominal pain, N/V, chest pain, dyspnea, pleuritic chest pain, urinary sxs.  Per family mild dementia.

## 2018-12-28 NOTE — ED PROVIDER NOTE - PMH
BPH (benign prostatic hyperplasia)    CAD (coronary artery disease)    HLD (hyperlipidemia)    HTN (hypertension)

## 2018-12-28 NOTE — ED PROVIDER NOTE - NSFOLLOWUPINSTRUCTIONS_ED_ALL_ED_FT
Contusion    A contusion is a deep bruise. Contusions are the result of a blunt injury to tissues and muscle fibers under the skin. The skin overlying the contusion may turn blue, purple, or yellow. Symptoms also include pain and swelling in the injured area.    SEEK IMMEDIATE MEDICAL CARE IF YOU HAVE ANY OF THE FOLLOWING SYMPTOMS: severe pain, numbness, tingling, pain, weakness, or skin color/temperature change in any part of your body distal to the injury.    Rib Fracture(s)    A rib fracture is a break or crack in one of the bones of the ribs. The ribs are a group of long, curved bones that wrap around your chest and attach to your spine. A broken or cracked rib is often painful, but most do not cause other problems and heal on their own over time. Symptoms include pain when you breath or cough or pain when pressing over the area. Breathing exercises will help keep your lungs inflated and prevent lung collapse or infection.    SEEK IMMEDIATE MEDICAL CARE IF YOU HAVE ANY OF THE FOLLOWING SYMPTOMS: fever, shortness of breath, coughing up blood, abdominal pain, nausea or vomiting, pain not controlled with medications.    Follow up with your primary medical doctor in 1-2 days  Follow up with rehab clinic in 1-2 days.

## 2018-12-28 NOTE — CONSULT NOTE ADULT - ASSESSMENT
ASSESSMENT:  86y Male s/p fall week ago with back pain, -HT, -LOC with above mentioned physical exam, imaging and labs.    PLAN:   -Pain Control as needed  -Encourage ambulation and Incentive Spirometer (10x/hour when awake) use  -Neurosurgery consult recommended  -No acute intervention needed at this time.  -Clear from trauma    12-28-18 @ 19:30 ASSESSMENT:  86y Male s/p fall week ago with back pain, -HT, -LOC with above mentioned physical exam, imaging and labs.    PLAN:   -Pain Control as needed  -Encourage ambulation and Incentive Spirometer (10x/hour when awake) use  -Neurosurgery consult recommended  -No acute intervention needed at this time.  -Clear from trauma    12-28-18 @ 19:30    Senior Trauma Resident Note  87yo male s/p [facility] fall 1 week ago -ht -loc on asa/plavix  9th rib fracture pulling 2L on is, minimal pain, dc on apap and motrin  tp fractures - nsx no intervention, outpt f/u  pt is more comfortable now and would like to go home  cleared from trauma perspective   Airway intact  Bilateral Breath Sounds  Palpable pulses in 4 ext  GCS 15, PERRL, COBURN  VSS  No Subq emphysema, abdominal tenderness,  or pelvic instability   CXR and PXR negative  Ct findings above  Will Dispo accordingly  Plan as above d/w Dr Abbe Churchill

## 2018-12-28 NOTE — ED PROVIDER NOTE - PHYSICAL EXAMINATION
CONST: Well appearing in NAD  HEAD: NC/AT   EYES: PERRL, EOMI, Sclera and conjunctiva clear.  NECK: No midline C/T/L tenderness  CARD: Normal S1 S2; Normal rate and rhythm  RESP: Equal BS B/L, No wheezes, rhonchi or rales. No distress  GI:  Soft, non-tender, non-distended.  MS: + left flank ecchymosis, no TTP of ribs, + left lumbar pain, no midline pain, + left straight leg raise, Normal ROM in all extremities. No edema of lower extremities, no calf pain, radial pulses 2+ bilaterally  SKIN: Warm, dry, no acute rashes. Good turgor  NEURO: A&Ox3, CN II-XII intact, no focal deficits, no facial asymmetry, no pronator drift, normal finger to nose, no nystagmus, gross sensation intact, UE/LE strength 5/5, stable gait

## 2018-12-28 NOTE — ED PROVIDER NOTE - MEDICAL DECISION MAKING DETAILS
86m w fall 1 wk ago, on ASA/Plavix w L flank ecchymosis and persistent pain. nontoxic appearing, n/v intact. Labs, EKG, & imaging reviewed. Care d/w Trauma & NeuroSx and no indication for operative intervention or inpatient management. Pt/family advised regarding symptomatic/supportive care, importance of PMD f/u, and symptoms to prompt ED return. Copy of results given to patient.

## 2018-12-28 NOTE — ED PROVIDER NOTE - PROGRESS NOTE DETAILS
Trauma consult called Discussed case with trauma, sandra.  recommends neurosurg eval.  no need for admission on their part. neurosurgery consulted.  will see pt in the ED. Discussed results with pt/family. Discussed pain control. All questions were answered and return precautions discussed.  Pt is asx and comfortable at this time.  Unremarkable re-exam.  No further concerns at this time from pt/family.  Will follow up with PMD and rehab clinic.  Pt understands and agrees with tx plan.  pt pain free at this time. 4/18 creat was 2.0 Discussed results with pt/family. Discussed pain control. All questions were answered and return precautions discussed.  Pt is asx and comfortable at this time.  Unremarkable re-exam.  No further concerns at this time from pt/family.  Will follow up with PMD and rehab clinic.  Pt understands and agrees with tx plan.  pt pain free at this time.  Discussed incidental findings. no pedal edema/no cyanosis/no clubbing

## 2018-12-28 NOTE — CONSULT NOTE ADULT - SUBJECTIVE AND OBJECTIVE BOX
HISTORY OF PRESENT ILLNESS:   86 y.o male with hx of CAD s/p CABG on ASA, hernia repair, BPH, HTN, LKTR presents to the ED for evaluation of back pain x 1 week.  Pt had mechanical trip/fall trying to get out of chair and fell on left flank with no head injury, LOC, neck pain.  Since then experiencing left lumbar pain radiating down left leg which is worse with ROM/ambulation and alleviated with rest/tylenol.  Pt concerned about duration of sxs prompting visit to the ED.  Denies abdominal pain, N/V, chest pain, dyspnea, pleuritic     PAST MEDICAL & SURGICAL HISTORY:  BPH (benign prostatic hyperplasia)  HLD (hyperlipidemia)  HTN (hypertension)  CAD (coronary artery disease)  History of total knee replacement  H/O hernia repair  S/P CABG (coronary artery bypass graft)    FAMILY HISTORY:      SOCIAL HISTORY:  Tobacco Use:  EtOH use:   Substance:    Allergies    No Known Allergies        REVIEW OF SYSTEMS      MEDICATIONS:  Antibiotics:      Vital Signs Last 24 Hrs  T(C): 36.1 (28 Dec 2018 12:16), Max: 36.1 (28 Dec 2018 12:16)  T(F): 97 (28 Dec 2018 12:16), Max: 97 (28 Dec 2018 12:16)  HR: 54 (28 Dec 2018 12:16) (54 - 54)  BP: 120/60 (28 Dec 2018 12:16) (120/60 - 120/60)  BP(mean): --  RR: 18 (28 Dec 2018 12:16) (18 - 18)  SpO2: 99% (28 Dec 2018 12:16) (99% - 99%)    PHYSICAL EXAM:      LABS:                        12.3   6.62  )-----------( 171      ( 28 Dec 2018 15:00 )             36.6     12-28    143  |  105  |  40<H>  ----------------------------<  96  5.9<H>   |  23  |  1.9<H>    Ca    9.5      28 Dec 2018 15:00    TPro  6.9  /  Alb  4.5  /  TBili  0.5  /  DBili  x   /  AST  17  /  ALT  17  /  AlkPhos  75  12-28    PT/INR - ( 28 Dec 2018 15:00 )   PT: 11.10 sec;   INR: 0.96 ratio         PTT - ( 28 Dec 2018 15:00 )  PTT:27.9 sec        RADIOLOGY & ADDITIONAL STUDIES:  < from: CT Abdomen and Pelvis No Cont (12.28.18 @ 16:54) >  IMPRESSION:   1. Acute fractures of the left transverse processes of L2, L3, and L4.  Likely subacute fracture of the left ninth posterior rib.  2. Otherwise, no acute traumatic injury to the chest, abdomen, or pelvis.  3. 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 CT follow-up isrecommended in 6-12 months.  4. Cholelithiasis.      A/p 86 year old male s/p Trip and fall         acute fx of the Left transverse processes of L2, L4 & L4         no neurosurgical intervention needed at this time         patient may participate with Physical therapy HISTORY OF PRESENT ILLNESS:   86 y.o male with hx of CAD s/p CABG on ASA, hernia repair, BPH, HTN, LKTR presents to the ED for evaluation of back pain x 1 week.  Pt had mechanical trip/fall trying to get out of chair and fell on left flank with no head injury, LOC, neck pain.  Since then experiencing left lumbar pain radiating down left leg which is worse with ROM/ambulation and alleviated with rest/tylenol.  Pt concerned about duration of sxs prompting visit to the ED.  Denies abdominal pain, N/V, chest pain, dyspnea, pleuritic     PAST MEDICAL & SURGICAL HISTORY:  BPH (benign prostatic hyperplasia)  HLD (hyperlipidemia)  HTN (hypertension)  CAD (coronary artery disease)  History of total knee replacement  H/O hernia repair  S/P CABG (coronary artery bypass graft)    FAMILY HISTORY:      SOCIAL HISTORY:  Tobacco Use:  EtOH use:   Substance:    Allergies    No Known Allergies        MEDICATIONS:  Antibiotics:      Vital Signs Last 24 Hrs  T(C): 36.1 (28 Dec 2018 12:16), Max: 36.1 (28 Dec 2018 12:16)  T(F): 97 (28 Dec 2018 12:16), Max: 97 (28 Dec 2018 12:16)  HR: 54 (28 Dec 2018 12:16) (54 - 54)  BP: 120/60 (28 Dec 2018 12:16) (120/60 - 120/60)  BP(mean): --  RR: 18 (28 Dec 2018 12:16) (18 - 18)  SpO2: 99% (28 Dec 2018 12:16) (99% - 99%)    PHYSICAL EXAM:  Alert , PERRLA   MAEX4   MS 5/5 bilaterally    Back no tenderness to palpation     LABS:                        12.3   6.62  )-----------( 171      ( 28 Dec 2018 15:00 )             36.6     12-28    143  |  105  |  40<H>  ----------------------------<  96  5.9<H>   |  23  |  1.9<H>    Ca    9.5      28 Dec 2018 15:00    TPro  6.9  /  Alb  4.5  /  TBili  0.5  /  DBili  x   /  AST  17  /  ALT  17  /  AlkPhos  75  12-28    PT/INR - ( 28 Dec 2018 15:00 )   PT: 11.10 sec;   INR: 0.96 ratio         PTT - ( 28 Dec 2018 15:00 )  PTT:27.9 sec        RADIOLOGY & ADDITIONAL STUDIES:  < from: CT Abdomen and Pelvis No Cont (12.28.18 @ 16:54) >  IMPRESSION:   1. Acute fractures of the left transverse processes of L2, L3, and L4.  Likely subacute fracture of the left ninth posterior rib.  2. Otherwise, no acute traumatic injury to the chest, abdomen, or pelvis.  3. 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 CT follow-up isrecommended in 6-12 months.  4. Cholelithiasis.      A/p 86 year old male s/p Trip and fall         acute fx of the Left transverse processes of L2, L4 & L4         no neurosurgical intervention needed at this time         may ambulate , if pt goes home can f/u with Dr Vaca in the office

## 2019-03-08 ENCOUNTER — APPOINTMENT (OUTPATIENT)
Dept: UROLOGY | Facility: CLINIC | Age: 84
End: 2019-03-08
Payer: MEDICARE

## 2019-05-14 ENCOUNTER — APPOINTMENT (OUTPATIENT)
Dept: UROLOGY | Facility: CLINIC | Age: 84
End: 2019-05-14
Payer: MEDICARE

## 2019-05-14 VITALS — BODY MASS INDEX: 23.7 KG/M2 | HEIGHT: 69 IN | WEIGHT: 160 LBS

## 2019-05-14 PROBLEM — I10 ESSENTIAL (PRIMARY) HYPERTENSION: Chronic | Status: ACTIVE | Noted: 2018-12-28

## 2019-05-14 PROBLEM — E78.5 HYPERLIPIDEMIA, UNSPECIFIED: Chronic | Status: ACTIVE | Noted: 2018-12-28

## 2019-05-14 PROBLEM — N40.0 BENIGN PROSTATIC HYPERPLASIA WITHOUT LOWER URINARY TRACT SYMPTOMS: Chronic | Status: ACTIVE | Noted: 2018-12-28

## 2019-05-14 PROBLEM — I25.10 ATHEROSCLEROTIC HEART DISEASE OF NATIVE CORONARY ARTERY WITHOUT ANGINA PECTORIS: Chronic | Status: ACTIVE | Noted: 2018-12-28

## 2019-05-14 PROCEDURE — 99213 OFFICE O/P EST LOW 20 MIN: CPT

## 2019-05-14 NOTE — HISTORY OF PRESENT ILLNESS
[FreeTextEntry1] : 86-year-old with history of BPH on rapaflo 4 mg daily. He says he's urinating with good urinary flow nocturia x02. No daytime frequency and urgency. There is no flank pain or sensation of incomplete voiding. PSA 3 years ago was 1.1. Transrectal ultrasound showed a 25 cc prostate 3 years ago

## 2019-05-14 NOTE — PHYSICAL EXAM
[Prostate Tenderness] : the prostate was not tender [General Appearance - In No Acute Distress] : no acute distress [No Prostate Nodules] : no prostate nodules [] : no respiratory distress [Prostate Size ___ (0-4)] : prostate size [unfilled] (scale: 0-4) [Normal Station and Gait] : the gait and station were normal for the patient's age [Oriented To Time, Place, And Person] : oriented to person, place, and time

## 2019-06-08 ENCOUNTER — OUTPATIENT (OUTPATIENT)
Dept: OUTPATIENT SERVICES | Facility: HOSPITAL | Age: 84
LOS: 1 days | Discharge: HOME | End: 2019-06-08
Payer: MEDICARE

## 2019-06-08 DIAGNOSIS — Z95.1 PRESENCE OF AORTOCORONARY BYPASS GRAFT: Chronic | ICD-10-CM

## 2019-06-08 DIAGNOSIS — Z98.890 OTHER SPECIFIED POSTPROCEDURAL STATES: Chronic | ICD-10-CM

## 2019-06-08 DIAGNOSIS — Z96.659 PRESENCE OF UNSPECIFIED ARTIFICIAL KNEE JOINT: Chronic | ICD-10-CM

## 2019-06-08 DIAGNOSIS — R91.1 SOLITARY PULMONARY NODULE: ICD-10-CM

## 2019-06-08 PROCEDURE — 71250 CT THORAX DX C-: CPT | Mod: 26

## 2019-06-09 ENCOUNTER — INPATIENT (INPATIENT)
Facility: HOSPITAL | Age: 84
LOS: 10 days | Discharge: NURSING, REHAB OR LONG-TERM CARE | End: 2019-06-20
Attending: INTERNAL MEDICINE | Admitting: INTERNAL MEDICINE
Payer: MEDICARE

## 2019-06-09 VITALS
TEMPERATURE: 96 F | RESPIRATION RATE: 18 BRPM | OXYGEN SATURATION: 98 % | SYSTOLIC BLOOD PRESSURE: 189 MMHG | HEART RATE: 80 BPM | DIASTOLIC BLOOD PRESSURE: 74 MMHG

## 2019-06-09 DIAGNOSIS — Z95.1 PRESENCE OF AORTOCORONARY BYPASS GRAFT: Chronic | ICD-10-CM

## 2019-06-09 DIAGNOSIS — Z96.659 PRESENCE OF UNSPECIFIED ARTIFICIAL KNEE JOINT: Chronic | ICD-10-CM

## 2019-06-09 DIAGNOSIS — Z98.890 OTHER SPECIFIED POSTPROCEDURAL STATES: Chronic | ICD-10-CM

## 2019-06-09 LAB
ALBUMIN SERPL ELPH-MCNC: 4 G/DL — SIGNIFICANT CHANGE UP (ref 3.5–5.2)
ALP SERPL-CCNC: 64 U/L — SIGNIFICANT CHANGE UP (ref 30–115)
ALT FLD-CCNC: 10 U/L — SIGNIFICANT CHANGE UP (ref 0–41)
ANION GAP SERPL CALC-SCNC: 14 MMOL/L — SIGNIFICANT CHANGE UP (ref 7–14)
AST SERPL-CCNC: 10 U/L — SIGNIFICANT CHANGE UP (ref 0–41)
BASE EXCESS BLDV CALC-SCNC: -4.7 MMOL/L — LOW (ref -2–2)
BILIRUB SERPL-MCNC: 0.3 MG/DL — SIGNIFICANT CHANGE UP (ref 0.2–1.2)
BUN SERPL-MCNC: 87 MG/DL — CRITICAL HIGH (ref 10–20)
CA-I SERPL-SCNC: 1.28 MMOL/L — SIGNIFICANT CHANGE UP (ref 1.12–1.3)
CALCIUM SERPL-MCNC: 9.3 MG/DL — SIGNIFICANT CHANGE UP (ref 8.5–10.1)
CHLORIDE SERPL-SCNC: 108 MMOL/L — SIGNIFICANT CHANGE UP (ref 98–110)
CO2 SERPL-SCNC: 19 MMOL/L — SIGNIFICANT CHANGE UP (ref 17–32)
CREAT SERPL-MCNC: 1.8 MG/DL — HIGH (ref 0.7–1.5)
GAS PNL BLDV: 143 MMOL/L — SIGNIFICANT CHANGE UP (ref 136–145)
GAS PNL BLDV: SIGNIFICANT CHANGE UP
GLUCOSE SERPL-MCNC: 142 MG/DL — HIGH (ref 70–99)
HCO3 BLDV-SCNC: 22 MMOL/L — SIGNIFICANT CHANGE UP (ref 22–29)
HCT VFR BLD CALC: 30.6 % — LOW (ref 42–52)
HCT VFR BLDA CALC: 34 % — SIGNIFICANT CHANGE UP (ref 34–44)
HGB BLD CALC-MCNC: 11.1 G/DL — LOW (ref 14–18)
HGB BLD-MCNC: 10.1 G/DL — LOW (ref 14–18)
INR BLD: 1.02 RATIO — SIGNIFICANT CHANGE UP (ref 0.65–1.3)
LACTATE BLDV-MCNC: 1.3 MMOL/L — SIGNIFICANT CHANGE UP (ref 0.5–1.6)
LACTATE SERPL-SCNC: 1.3 MMOL/L — SIGNIFICANT CHANGE UP (ref 0.5–2.2)
MAGNESIUM SERPL-MCNC: 2.3 MG/DL — SIGNIFICANT CHANGE UP (ref 1.8–2.4)
MCHC RBC-ENTMCNC: 32.2 PG — HIGH (ref 27–31)
MCHC RBC-ENTMCNC: 33 G/DL — SIGNIFICANT CHANGE UP (ref 32–37)
MCV RBC AUTO: 97.5 FL — HIGH (ref 80–94)
NRBC # BLD: 0 /100 WBCS — SIGNIFICANT CHANGE UP (ref 0–0)
NT-PROBNP SERPL-SCNC: 461 PG/ML — HIGH (ref 0–300)
PCO2 BLDV: 44 MMHG — SIGNIFICANT CHANGE UP (ref 41–51)
PH BLDV: 7.3 — SIGNIFICANT CHANGE UP (ref 7.26–7.43)
PLATELET # BLD AUTO: 168 K/UL — SIGNIFICANT CHANGE UP (ref 130–400)
PO2 BLDV: 14 MMHG — LOW (ref 20–40)
POTASSIUM BLDV-SCNC: 5 MMOL/L — SIGNIFICANT CHANGE UP (ref 3.3–5.6)
POTASSIUM SERPL-MCNC: 5.3 MMOL/L — HIGH (ref 3.5–5)
POTASSIUM SERPL-SCNC: 5.3 MMOL/L — HIGH (ref 3.5–5)
PROT SERPL-MCNC: 6.3 G/DL — SIGNIFICANT CHANGE UP (ref 6–8)
PROTHROM AB SERPL-ACNC: 11.7 SEC — SIGNIFICANT CHANGE UP (ref 9.95–12.87)
RBC # BLD: 3.14 M/UL — LOW (ref 4.7–6.1)
RBC # FLD: 13.8 % — SIGNIFICANT CHANGE UP (ref 11.5–14.5)
SAO2 % BLDV: 15 % — SIGNIFICANT CHANGE UP
SODIUM SERPL-SCNC: 141 MMOL/L — SIGNIFICANT CHANGE UP (ref 135–146)
TROPONIN T SERPL-MCNC: <0.01 NG/ML — SIGNIFICANT CHANGE UP
WBC # BLD: 11.86 K/UL — HIGH (ref 4.8–10.8)
WBC # FLD AUTO: 11.86 K/UL — HIGH (ref 4.8–10.8)

## 2019-06-09 PROCEDURE — 99285 EMERGENCY DEPT VISIT HI MDM: CPT | Mod: GC

## 2019-06-09 PROCEDURE — 93010 ELECTROCARDIOGRAM REPORT: CPT

## 2019-06-09 PROCEDURE — 71045 X-RAY EXAM CHEST 1 VIEW: CPT | Mod: 26

## 2019-06-09 NOTE — ED PROVIDER NOTE - NS ED ROS FT
Constitutional: See HPI.  Eyes: No visual changes  ENMT:  No neck pain  Cardiac: No cp  Respiratory: No cough  GI: No nausea, vomiting, diarrhea or abdominal pain.  : No dysuria, frequency or burning.   MS: No myalgia, muscle weakness, joint pain or back pain.  Psych: No suicidal or homicidal ideations.  Neuro: see hpi  Skin: No skin rash.

## 2019-06-09 NOTE — ED PROVIDER NOTE - PHYSICAL EXAMINATION
CONSTITUTIONAL: WA / WN / NAD  HEAD: NCAT  EYES: PERRL; EOMI;   ENT: Normal pharynx; mucous membranes pink/moist, no erythema.  NECK: Supple; no meningeal signs  CARD: RRR; nl S1/S2; no M/R/G.   RESP: Respiratory rate and effort are normal; breath sounds clear and equal bilaterally.  ABD: Soft, NT ND   RECTAL: Chapereoned by nurse payam. Soft brown stool in vault   MSK/EXT: No gross deformities; full range of motion.  SKIN: Warm and dry;   NEURO: AAOx3,   PSYCH: Memory Intact, Normal Affect

## 2019-06-09 NOTE — ED PROVIDER NOTE - ATTENDING CONTRIBUTION TO CARE
patient and family states that patient has not been feeling well, and has been complaining of dizziness x one day, so was spending on the bed due to dizziness/weakness, had no appetite and was not eating well, this evening had an episode of eye rolling/passed out; no other form of seizure like activity, and no tongue bite, no foaming at the mouth. no trauma. Family was near by and helped. Patient states that he is feeling better in ED.   Vitals noted  lungs: CTA, no crackles  abd: +BS, NT, ND, soft  CNS: awake, alert, o x 3, no focal neurologic deficits  no cerebellar signs  A/P: Syncope  r/o TIA/seizure  labs, CT, neuro consult  IVF, EKG, CXR  admission

## 2019-06-09 NOTE — ED ADULT TRIAGE NOTE - CHIEF COMPLAINT QUOTE
Two days ago I felt weak and my BP was low, this morning it happened again and now I couldn't move and almost fainted - patient

## 2019-06-09 NOTE — ED PROVIDER NOTE - PROGRESS NOTE DETAILS
Discussed with Neurology NP, pending evaluation. radha with Dr. Pina, will be admitted under dr. ocampo

## 2019-06-09 NOTE — ED PROVIDER NOTE - CLINICAL SUMMARY MEDICAL DECISION MAKING FREE TEXT BOX
Patient remained hemodynamically stable, improved well, labs/EKG/CT brain findings noted, is admitted to medicine for further care. All the information discussed with patient and his family.

## 2019-06-09 NOTE — ED PROVIDER NOTE - OBJECTIVE STATEMENT
86 year old male w/ a pmh of bph, cad s/p cabg on aspirin plavix, hld gerd presents here for dizziness / syncope. Patient states he's been having some decreased po intake and feeling "weak & dizzy". He was ambulating to the bathroom for the kitchen and when he sat down on the toliet son noticed his eyes rolled back. Patient states he's had similar sympoms the day prior. PAtient denies trauma fever chills cough n/v cp sob abdominal pain urinary frequency/urgency /burning black or bloody stools

## 2019-06-09 NOTE — ED ADULT NURSE NOTE - OBJECTIVE STATEMENT
Pt received in no acute distress, resting calmly and comfortably, reports syncopal episode today, weakness and low bp, denies head injuries or pain. COBURN, no obvious injuries noted, and PERRL. Children at bedside and report the pt walked to the bathroom for his dentures and his eyes rolled to the back of his head. Labs drawn, IV established, pt placed on continuos cardiac monitor.

## 2019-06-09 NOTE — ED ADULT NURSE NOTE - NSIMPLEMENTINTERV_GEN_ALL_ED
Implemented All Fall with Harm Risk Interventions:  [place] to call system. Call bell, personal items and telephone within reach. Instruct patient to call for assistance. Room bathroom lighting operational. Non-slip footwear when patient is off stretcher. Physically safe environment: no spills, clutter or unnecessary equipment. Stretcher in lowest position, wheels locked, appropriate side rails in place. Provide visual cue, wrist band, yellow gown, etc. Monitor gait and stability. Monitor for mental status changes and reorient to person, place, and time. Review medications for side effects contributing to fall risk. Reinforce activity limits and safety measures with patient and family. Provide visual clues: red socks.

## 2019-06-10 LAB
ALBUMIN SERPL ELPH-MCNC: 3.9 G/DL — SIGNIFICANT CHANGE UP (ref 3.5–5.2)
ALP SERPL-CCNC: 57 U/L — SIGNIFICANT CHANGE UP (ref 30–115)
ALT FLD-CCNC: 9 U/L — SIGNIFICANT CHANGE UP (ref 0–41)
ANION GAP SERPL CALC-SCNC: 13 MMOL/L — SIGNIFICANT CHANGE UP (ref 7–14)
APPEARANCE UR: CLEAR — SIGNIFICANT CHANGE UP
AST SERPL-CCNC: 9 U/L — SIGNIFICANT CHANGE UP (ref 0–41)
BASOPHILS # BLD AUTO: 0.03 K/UL — SIGNIFICANT CHANGE UP (ref 0–0.2)
BASOPHILS NFR BLD AUTO: 0.3 % — SIGNIFICANT CHANGE UP (ref 0–1)
BILIRUB SERPL-MCNC: 0.2 MG/DL — SIGNIFICANT CHANGE UP (ref 0.2–1.2)
BILIRUB UR-MCNC: NEGATIVE — SIGNIFICANT CHANGE UP
BUN SERPL-MCNC: 85 MG/DL — CRITICAL HIGH (ref 10–20)
CALCIUM SERPL-MCNC: 9.3 MG/DL — SIGNIFICANT CHANGE UP (ref 8.5–10.1)
CHLORIDE SERPL-SCNC: 112 MMOL/L — HIGH (ref 98–110)
CK MB CFR SERPL CALC: 1.8 NG/ML — SIGNIFICANT CHANGE UP (ref 0.6–6.3)
CK SERPL-CCNC: 43 U/L — SIGNIFICANT CHANGE UP (ref 0–225)
CO2 SERPL-SCNC: 19 MMOL/L — SIGNIFICANT CHANGE UP (ref 17–32)
COLOR SPEC: YELLOW — SIGNIFICANT CHANGE UP
CREAT SERPL-MCNC: 1.9 MG/DL — HIGH (ref 0.7–1.5)
DIFF PNL FLD: NEGATIVE — SIGNIFICANT CHANGE UP
EOSINOPHIL # BLD AUTO: 0.02 K/UL — SIGNIFICANT CHANGE UP (ref 0–0.7)
EOSINOPHIL NFR BLD AUTO: 0.2 % — SIGNIFICANT CHANGE UP (ref 0–8)
GLUCOSE SERPL-MCNC: 142 MG/DL — HIGH (ref 70–99)
GLUCOSE UR QL: NEGATIVE MG/DL — SIGNIFICANT CHANGE UP
HCT VFR BLD CALC: 28.3 % — LOW (ref 42–52)
HGB BLD-MCNC: 9.4 G/DL — LOW (ref 14–18)
IMM GRANULOCYTES NFR BLD AUTO: 1 % — HIGH (ref 0.1–0.3)
IRON SATN MFR SERPL: 31 % — SIGNIFICANT CHANGE UP (ref 15–50)
IRON SATN MFR SERPL: 79 UG/DL — SIGNIFICANT CHANGE UP (ref 35–150)
KETONES UR-MCNC: NEGATIVE — SIGNIFICANT CHANGE UP
LEUKOCYTE ESTERASE UR-ACNC: NEGATIVE — SIGNIFICANT CHANGE UP
LYMPHOCYTES # BLD AUTO: 2.39 K/UL — SIGNIFICANT CHANGE UP (ref 1.2–3.4)
LYMPHOCYTES # BLD AUTO: 20.1 % — LOW (ref 20.5–51.1)
MCHC RBC-ENTMCNC: 32.3 PG — HIGH (ref 27–31)
MCHC RBC-ENTMCNC: 33.2 G/DL — SIGNIFICANT CHANGE UP (ref 32–37)
MCV RBC AUTO: 97.3 FL — HIGH (ref 80–94)
MONOCYTES # BLD AUTO: 1 K/UL — HIGH (ref 0.1–0.6)
MONOCYTES NFR BLD AUTO: 8.4 % — SIGNIFICANT CHANGE UP (ref 1.7–9.3)
NEUTROPHILS # BLD AUTO: 8.32 K/UL — HIGH (ref 1.4–6.5)
NEUTROPHILS NFR BLD AUTO: 70 % — SIGNIFICANT CHANGE UP (ref 42.2–75.2)
NITRITE UR-MCNC: NEGATIVE — SIGNIFICANT CHANGE UP
NRBC # BLD: 0 /100 WBCS — SIGNIFICANT CHANGE UP (ref 0–0)
PH UR: 5.5 — SIGNIFICANT CHANGE UP (ref 5–8)
PLATELET # BLD AUTO: 161 K/UL — SIGNIFICANT CHANGE UP (ref 130–400)
POTASSIUM SERPL-MCNC: 4.9 MMOL/L — SIGNIFICANT CHANGE UP (ref 3.5–5)
POTASSIUM SERPL-SCNC: 4.9 MMOL/L — SIGNIFICANT CHANGE UP (ref 3.5–5)
PROT SERPL-MCNC: 5.9 G/DL — LOW (ref 6–8)
PROT UR-MCNC: NEGATIVE MG/DL — SIGNIFICANT CHANGE UP
RBC # BLD: 2.91 M/UL — LOW (ref 4.7–6.1)
RBC # FLD: 13.8 % — SIGNIFICANT CHANGE UP (ref 11.5–14.5)
SODIUM SERPL-SCNC: 144 MMOL/L — SIGNIFICANT CHANGE UP (ref 135–146)
SP GR SPEC: 1.02 — SIGNIFICANT CHANGE UP (ref 1.01–1.03)
TIBC SERPL-MCNC: 257 UG/DL — SIGNIFICANT CHANGE UP (ref 220–430)
TRANSFERRIN SERPL-MCNC: 222 MG/DL — SIGNIFICANT CHANGE UP (ref 200–360)
TROPONIN T SERPL-MCNC: <0.01 NG/ML — SIGNIFICANT CHANGE UP
UIBC SERPL-MCNC: 178 UG/DL — SIGNIFICANT CHANGE UP (ref 110–370)
UROBILINOGEN FLD QL: 0.2 MG/DL — SIGNIFICANT CHANGE UP (ref 0.2–0.2)
WBC # BLD: 11.88 K/UL — HIGH (ref 4.8–10.8)
WBC # FLD AUTO: 11.88 K/UL — HIGH (ref 4.8–10.8)

## 2019-06-10 PROCEDURE — 99222 1ST HOSP IP/OBS MODERATE 55: CPT

## 2019-06-10 PROCEDURE — 70450 CT HEAD/BRAIN W/O DYE: CPT | Mod: 26

## 2019-06-10 PROCEDURE — 93306 TTE W/DOPPLER COMPLETE: CPT | Mod: 26

## 2019-06-10 RX ORDER — CLOPIDOGREL BISULFATE 75 MG/1
75 TABLET, FILM COATED ORAL DAILY
Refills: 0 | Status: DISCONTINUED | OUTPATIENT
Start: 2019-06-10 | End: 2019-06-10

## 2019-06-10 RX ORDER — SIMVASTATIN 20 MG/1
40 TABLET, FILM COATED ORAL AT BEDTIME
Refills: 0 | Status: DISCONTINUED | OUTPATIENT
Start: 2019-06-10 | End: 2019-06-20

## 2019-06-10 RX ORDER — LISINOPRIL 2.5 MG/1
10 TABLET ORAL DAILY
Refills: 0 | Status: DISCONTINUED | OUTPATIENT
Start: 2019-06-10 | End: 2019-06-19

## 2019-06-10 RX ORDER — CHLORHEXIDINE GLUCONATE 213 G/1000ML
1 SOLUTION TOPICAL
Refills: 0 | Status: DISCONTINUED | OUTPATIENT
Start: 2019-06-10 | End: 2019-06-20

## 2019-06-10 RX ORDER — SODIUM CHLORIDE 9 MG/ML
1000 INJECTION, SOLUTION INTRAVENOUS ONCE
Refills: 0 | Status: COMPLETED | OUTPATIENT
Start: 2019-06-10 | End: 2019-06-10

## 2019-06-10 RX ORDER — LISINOPRIL 2.5 MG/1
1 TABLET ORAL
Qty: 0 | Refills: 0 | DISCHARGE

## 2019-06-10 RX ORDER — CARVEDILOL PHOSPHATE 80 MG/1
12.5 CAPSULE, EXTENDED RELEASE ORAL EVERY 12 HOURS
Refills: 0 | Status: DISCONTINUED | OUTPATIENT
Start: 2019-06-10 | End: 2019-06-20

## 2019-06-10 RX ORDER — ASPIRIN/CALCIUM CARB/MAGNESIUM 324 MG
81 TABLET ORAL DAILY
Refills: 0 | Status: DISCONTINUED | OUTPATIENT
Start: 2019-06-10 | End: 2019-06-10

## 2019-06-10 RX ADMIN — SIMVASTATIN 40 MILLIGRAM(S): 20 TABLET, FILM COATED ORAL at 21:26

## 2019-06-10 RX ADMIN — LISINOPRIL 10 MILLIGRAM(S): 2.5 TABLET ORAL at 06:26

## 2019-06-10 RX ADMIN — SODIUM CHLORIDE 1000 MILLILITER(S): 9 INJECTION, SOLUTION INTRAVENOUS at 01:26

## 2019-06-10 RX ADMIN — CHLORHEXIDINE GLUCONATE 1 APPLICATION(S): 213 SOLUTION TOPICAL at 06:01

## 2019-06-10 RX ADMIN — CARVEDILOL PHOSPHATE 12.5 MILLIGRAM(S): 80 CAPSULE, EXTENDED RELEASE ORAL at 05:59

## 2019-06-10 RX ADMIN — CARVEDILOL PHOSPHATE 12.5 MILLIGRAM(S): 80 CAPSULE, EXTENDED RELEASE ORAL at 18:05

## 2019-06-10 NOTE — PROGRESS NOTE ADULT - SUBJECTIVE AND OBJECTIVE BOX
CHICHO CALLI  86y  Male      SUBJECTIVE:  Patient felt weak last weak.Bp was low as per son.No melena,no bleeding.  Patient ate poorly yesterday.Patient walked to bathroom and felt weak. He urinated and felt very weak and sat on toilet. He pased out for 10 minutes. No incontinence ,no tongue biting ,no chest pain and no palpitation.  Recently became more anemic and B-12,folate normal.Iron sat 23% and ferritin 123. Recent hgb went from 12.5 to 11.6.  Stool guaiac negative\      Attending Admission  Note:  PMH:  CAD/CABG  CKD-STAGE 3  MELANOMA   ANEMIA  BPH  RIGHT TKR   HYPERLIPIDEMIA  GERD  ASBESTOSIS      REVIEW OF SYSTEMS:    T(C): 35.9 (06-10-19 @ 05:33), Max: 36 (06-09-19 @ 23:34)  HR: 72 (06-10-19 @ 02:12) (72 - 83)  BP: 155/69 (06-10-19 @ 02:12) (114/72 - 189/74)  RR: 18 (06-10-19 @ 02:12) (18 - 18)  SpO2: 99% (06-10-19 @ 02:12) (98% - 100%)  Wt(kg): --Vital Signs Last 24 Hrs  T(C): 35.9 (10 Ronnell 2019 05:33), Max: 36 (09 Jun 2019 23:34)  T(F): 96.6 (10 Ronnell 2019 05:33), Max: 96.8 (09 Jun 2019 23:34)  HR: 72 (10 Ronnell 2019 02:12) (72 - 83)  BP: 155/69 (10 Ronnell 2019 02:12) (114/72 - 189/74)  BP(mean): --  RR: 18 (10 Ronnell 2019 02:12) (18 - 18)  SpO2: 99% (10 Ronnell 2019 02:12) (98% - 100%)    PHYSICAL EXAM:  LUNGS-CLEAR  COR-REG,NL S1 &S2  ABD-SOFT,NON TENDER  EXT-NO CALF TENDERNESS  NEURO-NO FOCAL    LABS:                        9.4    11.88 )-----------( 161      ( 10 Ronnell 2019 06:21 )             28.3   06-10    144  |  112<H>  |  85<HH>  ----------------------------<  142<H>  4.9   |  19  |  1.9<H>    Ca    9.3      10 Ronnell 2019 06:21  Mg     2.3     06-09    TPro  5.9<L>  /  Alb  3.9  /  TBili  0.2  /  DBili  x   /  AST  9   /  ALT  9   /  AlkPhos  57  06-10        RADIOLOGY:      IMPRESSION:      < from: CT Head No Cont (06.10.19 @ 00:24) >    EXAM:  CT BRAIN            PROCEDURE DATE:  06/10/2019            INTERPRETATION:  CLINICAL HISTORY / REASON FOR EXAM: Syncope    TECHNIQUE: Multiple contiguous axial CT images were obtained from the   base of the skull to the vertex without administration of intravenous   contrast. Axial, coronal and sagittal images were reviewed.    COMPARISON: CT head from December 28, 2018    FINDINGS:     The ventricles, basal cisterns and sulcal pattern are within normal   limits for the patient's stated age.      The gray-white matter differentiation is preserved.    There are patchy subcortical white matter hypodensities consistent with   age-related chronic microvascular changes.    There is no acute mass effect, midline shift or intracranial hemorrhage.      The imaged paranasal sinuses and bilateral mastoid complexes are   unremarkable.    No evidence of a depressed skull fracture.    Beam hardening artifact is noted overlying the brain stem and posterior   fossa which is inherent to CT in this location.    Cutaneous tissue prominence is noted in the vertex.      IMPRESSION:       No evidence of intracranial hemorrhage, territorial infarct, or mass   effect.              PATRICIA CASAS M.D., RESIDENT RADIOLOGIST  This document has been electronically signed.  RAIMUNDO ZULUAGA M.D., ATTENDING RADIOLOGIST  This document has been electronically signed. Ronnell 10 2019 12:39AM        < end of copied text >    < from: Xray Chest 1 View-PORTABLE IMMEDIATE (06.09.19 @ 23:32) >        < from: Xray Chest 1 View-PORTABLE IMMEDIATE (06.09.19 @ 23:32) >      INTERPRETATION:  CLINICAL HISTORY / REASON FOR EXAM: Cough.    COMPARISON: Chest radiograph from December 28, 2018. CT chest from June 8, 2019    TECHNIQUE/POSITIONING: Satisfactory. Single image, AP portable chest   radiograph.    FINDINGS:    SUPPORT DEVICES: None.    CARDIAC/MEDIASTINUM/HILUM: Post sternotomy.    LUNG PARENCHYMA/PLEURA: Biapical pleural thickening, better seen on   recent CT chest. No focalconsolidation or pleural effusion. No   pneumothorax.    SKELETON/SOFT TISSUES: No acute osseous abnormality.      IMPRESSION:        Biapical pleural thickening, better seen on recent CT chest.              PATRICIA CASAS M.D., RESIDENT RADIOLOGIST  This document has been electronically signed.  RAIMUNDO ZULUAGA M.D., ATTENDING RADIOLOGIST  This document has been electronically signed. Ronnell 10 2019 12:24AM        < end of copied text >    IMPRESSION:    SYNCOPE-PROBABLY VASOVAGAL  [id number] DROPPED FROM 12.5 TO 9.4  CKD-STAGE 3  CAD/CABG  RIGHT TKR  BPH  S/P MELANOMA LEFT NOSTRIL  ASBESTOSIS  HYPERLIPIDEMIA        PLAN:  D/C ASA AND CLOPIDROGEL  MONITOR CBC AND BMP  DR. KATALINA FULLER HEME CONSULT  DR. MENDOZA -CARDIOLOGY CONSULT  NEURO CONSULT  ECHO  EEG  TELEMETRY  I SPENT 60 MINUTES EXAMINING,EVALUATING,CONSELING PATIENT AND SON AND COORDINATING CARE

## 2019-06-10 NOTE — H&P ADULT - NSICDXPASTMEDICALHX_GEN_ALL_CORE_FT
PAST MEDICAL HISTORY:  BPH (benign prostatic hyperplasia)     CAD (coronary artery disease)     HLD (hyperlipidemia)     HTN (hypertension)

## 2019-06-10 NOTE — H&P ADULT - NSICDXPASTSURGICALHX_GEN_ALL_CORE_FT
PAST SURGICAL HISTORY:  H/O hernia repair     History of total knee replacement     S/P CABG (coronary artery bypass graft)

## 2019-06-10 NOTE — H&P ADULT - HISTORY OF PRESENT ILLNESS
86 year old male w/ a pmh of bph, cad s/p cabg on aspirin plavix, hld gerd presents here for dizziness / syncope. Patient states he's been having some decreased po intake and feeling "weak & dizzy". He was ambulating to the bathroom for the kitchen and when he sat down on the toliet son noticed his eyes rolled back. Patient states he's had similar sympoms the day prior. PAtient denies trauma fever chills cough n/v cp sob abdominal pain urinary frequency/urgency /burning black or bloody stools      vitals in ED: 189/74, 80, 96.3F, 18, 98% on room air, labs showed inc WBC count of 11,000, HB 10 baseline, MCV 97 macrocytic, BUN 87, creatinine 1.8, CT head negative, UA negative, chest xray showed biapical pleural thickening which was similar to prior CT chest. received 1litre LR bolus in ED. 86 year old male with PMH of BPH, CAD s/p CABG, DLD, GERD presents here for dizziness and Loss of consciousness for 10 seconds. As per patient and family at bedside he is feeling weak, dec PO intake since last week gradually became more weak yesterday night he walked from kitchen to restroom and sat down on the toilet bowl then for 10 seconds he didn't responded his eyes rolled up and started shaking of his arms then he returned to baseline after 10 seconds. No confusion after the episode but he didn't remember what happened Denies any fever, chills, chest pain, SOB, palpitations, nausea, vomiting, abd pain, diarrhea, runny nose, sore throat, weight loss, loss of appetite, dysuria, hematemesis, melena, hematochezia.     vitals in ED: 189/74, 80, 96.3F, 18, 98% on room air, labs showed inc WBC count of 11,000, HB 10 baseline, MCV 97 macrocytic, BUN 87, creatinine 1.8, CT head negative, UA negative, chest xray showed biapical pleural thickening which was similar to prior CT chest. received 1litre LR bolus in ED.

## 2019-06-10 NOTE — CONSULT NOTE ADULT - SUBJECTIVE AND OBJECTIVE BOX
CC: Syncope      HPI:  86 year old right handed male with PMH of BPH, CAD s/p CABG, DLD, GERD presented for syncopal episode. As per son at bedside patient has been experiencing generalized weakness for a week and as of yesterday he went to the bathroom to urinate when he felt dizzy and passed out sitting on the toilet bowl . Son noticed eye rolling and trembling which lasted for approx 10-15 seconds and patient woke up and returned back to baseline immediately.  No b/b incontinence ,or confusion after the episode but he didn't remember what happened. Denies any fever, chills, nausea, vomiting, fall trauma.Patient is independednt and fully functional at baseline    Home Medications:  Aspirin 81 mg oral tablet: q 24  Carvedilol 12.5 mg oral tablet: q bid  Clopidogrel 75 mg oral tablet: q 24  Ezetimibe-simvastatin 10 mg-40 mg oral tablet: q 24  Lisinopril 10 mg oral tablet: q 24  Nitrostat 0.4 mg sublingual tablet: 1 tab(s) sublingual every 5 minutes, As Needed   Rapaflo 4 mg oral capsule :q 24  Zantac 150 oral tablet: 1 tab(s) q bid    Social history  Negative x 3      Neuro Exam:  Orientation: oriented to person, place and time, speech fluent  Cranial Nerves:  visual fields full to confrontation, pupils equal round and reactive to light, extraocular motion intact, facial sensation intact symmetrically, face symmetrical, hearing was intact bilaterally, tongue and palate midline, head turning and shoulder shrug symmetric and there was no tongue deviation with protrusion.   Motor: 5/5 / no abnormal involuntary movements  Sensory exam:  intact.   Coordination:. no dysmetria or limb ataxia  gait: deferred      NIHSS:     Allergies    No Known Allergies    Intolerances      MEDICATIONS  (STANDING):  aspirin enteric coated 81 milliGRAM(s) Oral daily  carvedilol 12.5 milliGRAM(s) Oral every 12 hours  chlorhexidine 4% Liquid 1 Application(s) Topical <User Schedule>  clopidogrel Tablet 75 milliGRAM(s) Oral daily  lisinopril 10 milliGRAM(s) Oral daily  simvastatin 40 milliGRAM(s) Oral at bedtime    MEDICATIONS  (PRN):      LABS:                        10.1   11.86 )-----------( 168      ( 09 Jun 2019 22:50 )             30.6     06-09    141  |  108  |  87<HH>  ----------------------------<  142<H>  5.3<H>   |  19  |  1.8<H>    Ca    9.3      09 Jun 2019 22:50  Mg     2.3     06-09    TPro  6.3  /  Alb  4.0  /  TBili  0.3  /  DBili  x   /  AST  10  /  ALT  10  /  AlkPhos  64  06-09    PT/INR - ( 09 Jun 2019 22:50 )   PT: 11.70 sec;   INR: 1.02 ratio                 Neuro Imaging:  < from: CT Head No Cont (06.10.19 @ 00:24) >  FINDINGS:     The ventricles, basal cisterns and sulcal pattern are within normal   limits for the patient's stated age.      The gray-white matter differentiation is preserved.    There are patchy subcortical white matter hypodensities consistent with   age-related chronic microvascular changes.    There is no acute mass effect, midline shift or intracranial hemorrhage.      The imaged paranasal sinuses and bilateral mastoid complexes are   unremarkable.    No evidence of a depressed skull fracture.    Beam hardening artifact is noted overlying the brain stem and posterior   fossa which is inherent to CT in this location.    Cutaneous tissue prominence is noted in the vertex.      IMPRESSION:       No evidence of intracranial hemorrhage, territorial infarct, or mass   effect.      < end of copied text >    EEG:     Echo:   Carotid Doppler: N/A  Telemetry:

## 2019-06-10 NOTE — H&P ADULT - NSHPPHYSICALEXAM_GEN_ALL_CORE
ICU Vital Signs Last 24 Hrs  T(C): 36 (09 Jun 2019 23:34), Max: 36 (09 Jun 2019 23:34)  T(F): 96.8 (09 Jun 2019 23:34), Max: 96.8 (09 Jun 2019 23:34)  HR: 72 (09 Jun 2019 23:34) (72 - 83)  BP: 144/68 (09 Jun 2019 23:34) (114/72 - 189/74)  BP(mean): --  ABP: --  ABP(mean): --  RR: 18 (09 Jun 2019 23:34) (18 - 18)  SpO2: 100% (09 Jun 2019 23:34) (98% - 100%)    PHYSICAL EXAM:  GENERAL: NAD, speaks in full sentences, no signs of respiratory distress  HEAD:  Atraumatic, Normocephalic  EYES: EOMI, PERRLA, conjunctiva and sclera clear  NECK: Supple, No JVD  CHEST/LUNG: Clear to auscultation bilaterally; No wheeze; No crackles; No accessory muscles used  HEART: Regular rate and rhythm; No murmurs;   ABDOMEN: Soft, Nontender, Nondistended; Bowel sounds present; No guarding  EXTREMITIES:  2+ Peripheral Pulses, No cyanosis or edema  PSYCH: AAOx3 ICU Vital Signs Last 24 Hrs  T(C): 36 (09 Jun 2019 23:34), Max: 36 (09 Jun 2019 23:34)  T(F): 96.8 (09 Jun 2019 23:34), Max: 96.8 (09 Jun 2019 23:34)  HR: 72 (09 Jun 2019 23:34) (72 - 83)  BP: 144/68 (09 Jun 2019 23:34) (114/72 - 189/74)  BP(mean): --  ABP: --  ABP(mean): --  RR: 18 (09 Jun 2019 23:34) (18 - 18)  SpO2: 100% (09 Jun 2019 23:34) (98% - 100%)    PHYSICAL EXAM:  GENERAL: NAD, speaks in full sentences, no signs of respiratory distress  HEAD:  Atraumatic, Normocephalic  EYES: EOMI, PERRLA, conjunctiva and sclera clear  CHEST/LUNG: Clear to auscultation bilaterally; No wheeze; No crackles; No accessory muscles used  HEART: Regular rate and rhythm; No murmurs;   ABDOMEN: Soft, Nontender, Nondistended; Bowel sounds present; No guarding  EXTREMITIES: No edema  PSYCH: AAOx3

## 2019-06-10 NOTE — CONSULT NOTE ADULT - ASSESSMENT
85 yo RHM with PMH of BPH, CAD s/p CABG, DLD, GERD presented for syncopal episode patient went to the bathroom walking felt dizzy and passed out sitting on the toilet bowl . Son noticed eye rolling and trembling which lasted for approx 10-15 seconds and patient woke up and returned back to baseline immediately.     This is high likely a vasovagal syncope and less likely a seizure      Neuro attending note will follow

## 2019-06-10 NOTE — CONSULT NOTE ADULT - ATTENDING COMMENTS
Patient examined.  Agree with assessment that patient's episode likely syncope instead of seizure.  Additionally some blood in stool discovered.  Recommend further workup for this as may be contributing to syncope.  Telemetry to r/o arrythmia.

## 2019-06-10 NOTE — H&P ADULT - ASSESSMENT
#)Diet:   #)ACtivity:   #)DVT ppx:  #)GI ppx:  #)Dispo:   #)Code: 86 year old male with PMH of BPH, CAD s/p CABG, DLD, GERD presents here for dizziness and Loss of consciousness for 10 seconds.    #)Loss of consciousness, dizziness DD: Likely seizure like activity(rolling of eyes, shaking but no confusion after the episode), less likely cardiac no chest pain, palpitations vs vasovagal  -will check orthostats  -2d echo  -tele monitoring  -EEG   -trops negX1, follow up second set    #)CAD s/p CABG 17 years ago  -on aspirin and plavix don't know why he is on both continue for now told him to follow up with his primary and cardiology  -c/w BB, hold lisinopril for now    #)DLD  c/w simvastatin    #)BPH  -on rapaflo at home  -flomax    #)Diet: DASH diet  #)ACtivity: Ambulate with assistance  #)DVT ppx: SCD  #)GI ppx: Not indicated  #)Dispo: From home  #)Code: Full 86 year old male with PMH of BPH, CAD s/p CABG, DLD, GERD presents here for dizziness and Loss of consciousness for 10 seconds.    #)Loss of consciousness, dizziness DD: Likely seizure like activity(rolling of eyes, shaking but no confusion after the episode), less likely cardiac no chest pain, palpitations vs vasovagal  -will check orthostats  -2d echo  -tele monitoring  -EEG   -trops negX1, follow up second set    #)CAD s/p CABG 17 years ago  -on aspirin and plavix don't know why he is on both continue for now told him to follow up with his primary and cardiology  -c/w BB, lisinopril    #)DLD  c/w simvastatin    #)BPH  -on rapaflo at home  -flomax    #)CKD-stable  creatinine at baseline     #)Diet: DASH diet  #)ACtivity: Ambulate with assistance  #)DVT ppx: SCD  #)GI ppx: Not indicated  #)Dispo: From home  #)Code: Full 86 year old male with PMH of BPH, CAD s/p CABG, DLD, GERD presents here for dizziness and Loss of consciousness for 10 seconds.    #)Loss of consciousness, dizziness DD: Likely seizure like activity(rolling of eyes, shaking but no confusion after the episode), less likely cardiac no chest pain, palpitations vs vasovagal  -will check orthostats  -2d echo  -tele monitoring  -EEG   -trops negX1, follow up second set    #)CAD s/p CABG 17 years ago  -on aspirin and plavix don't know why he is on both continue for now told him to follow up with his primary and cardiology  -c/w BB, lisinopril    #)Acute on chronic anemia, macrocytic  -Baseline Hb is 12 current Hb is 10, no evidence of active bleeding  -will check Iron studies, Vit b12,folate  -trend hb, active type and screen     #)DLD  c/w simvastatin    #)BPH  -on rapaflo at home  -flomax    #)CKD-stable  creatinine at baseline     #)Diet: DASH diet  #)ACtivity: Ambulate with assistance  #)DVT ppx: SCD  #)GI ppx: Not indicated  #)Dispo: From home  #)Code: Full

## 2019-06-11 DIAGNOSIS — Z02.9 ENCOUNTER FOR ADMINISTRATIVE EXAMINATIONS, UNSPECIFIED: ICD-10-CM

## 2019-06-11 LAB
FERRITIN SERPL-MCNC: 103 NG/ML — SIGNIFICANT CHANGE UP (ref 30–400)
FOLATE SERPL-MCNC: 11.9 NG/ML — SIGNIFICANT CHANGE UP
OB PNL STL: POSITIVE
VIT B12 SERPL-MCNC: 351 PG/ML — SIGNIFICANT CHANGE UP (ref 232–1245)

## 2019-06-11 RX ORDER — PANTOPRAZOLE SODIUM 20 MG/1
40 TABLET, DELAYED RELEASE ORAL EVERY 12 HOURS
Refills: 0 | Status: DISCONTINUED | OUTPATIENT
Start: 2019-06-11 | End: 2019-06-19

## 2019-06-11 RX ADMIN — PANTOPRAZOLE SODIUM 40 MILLIGRAM(S): 20 TABLET, DELAYED RELEASE ORAL at 17:43

## 2019-06-11 RX ADMIN — CHLORHEXIDINE GLUCONATE 1 APPLICATION(S): 213 SOLUTION TOPICAL at 05:59

## 2019-06-11 RX ADMIN — CARVEDILOL PHOSPHATE 12.5 MILLIGRAM(S): 80 CAPSULE, EXTENDED RELEASE ORAL at 17:43

## 2019-06-11 RX ADMIN — LISINOPRIL 10 MILLIGRAM(S): 2.5 TABLET ORAL at 05:59

## 2019-06-11 RX ADMIN — SIMVASTATIN 40 MILLIGRAM(S): 20 TABLET, FILM COATED ORAL at 21:05

## 2019-06-11 RX ADMIN — CARVEDILOL PHOSPHATE 12.5 MILLIGRAM(S): 80 CAPSULE, EXTENDED RELEASE ORAL at 05:59

## 2019-06-11 NOTE — CONSULT NOTE ADULT - ASSESSMENT
Symptomatic anemia , multifactorial, secondary to GI bleeding and CKD 3  Anemia secondary to CKD 3  S/P syncope  Coronary artery disease.  History of Melanoma.  CKD 3      GI work up.  Discontinued Aspirin and Plavix at present.  We will give Procrit, 40,000 units, SC today for anemia secondary to CKD

## 2019-06-11 NOTE — CONSULT NOTE ADULT - SUBJECTIVE AND OBJECTIVE BOX
Gastroenterology/Hepatology Consultation    For questions and inquiries please page [phone].  For urgent matters or after 5pm and on weekends please page the fellow on call through the GI paging system.    86y Male with anemia and occult GIB.  Patient is a 86y old  Male who presents with a chief complaint of weakness (11 Jun 2019 10:03)    HPI:  86 year old male with PMH of BPH, CAD s/p CABG, DLD, GERD presents here for dizziness and Loss of consciousness for 10 seconds. As per patient and family at bedside he is feeling weak, dec PO intake since last week gradually became more weak yesterday night he walked from kitchen to restroom and sat down on the toilet bowl then for 10 seconds he didn't responded his eyes rolled up and started shaking of his arms then he returned to baseline after 10 seconds. No confusion after the episode but he didn't remember what happened Denies any fever, chills, chest pain, SOB, palpitations, nausea, vomiting, abd pain, diarrhea, runny nose, sore throat, weight loss, loss of appetite, dysuria, hematemesis, melena, hematochezia.     vitals in ED: 189/74, 80, 96.3F, 18, 98% on room air, labs showed inc WBC count of 11,000, HB 10 baseline, MCV 97 macrocytic, BUN 87, creatinine 1.8, CT head negative, UA negative, chest xray showed biapical pleural thickening which was similar to prior CT chest. received 1litre LR bolus in ED. (10 Ronnell 2019 01:08)      GI Hx:    Previous colonoscopy(ies):  Previous EGD(s):        Review of system  General:  (-) weight loss, (-) fevers  Eyes:  (-) visual changes  CV:  (-) chest pain  Resp: (-) SOB, (-) wheezing  GI: (-) abdominal pain,  (-) nausea, (-) vomiting, (-) dysphagia, (-) diarrhea, (-) constipation, (-) rectal bleeding, (-) melena, (-) hematemesis.  Neuro: (-) confusion, (-) weakness  Psych:  (-) Hallucinations  Heme:  (-) easy bruisability    Past medical/surgical Hx:  PAST MEDICAL & SURGICAL HISTORY:  BPH (benign prostatic hyperplasia)  HLD (hyperlipidemia)  HTN (hypertension)  CAD (coronary artery disease)  History of total knee replacement  H/O hernia repair  S/P CABG (coronary artery bypass graft)    Home Medications:  aspirin 81 mg oral tablet: 1 tab(s) orally once a day  carvedilol 12.5 mg oral tablet: 1 tab(s) orally 2 times a day  clopidogrel 75 mg oral tablet: 1 tab(s) orally once a day  ezetimibe-simvastatin 10 mg-40 mg oral tablet: 1 tab(s) orally once a day  lisinopril 10 mg oral tablet: 1 tab(s) orally once a day  Nitrostat 0.4 mg sublingual tablet: 1 tab(s) sublingual every 5 minutes, As Needed  Rapaflo 4 mg oral capsule: 1 cap(s) orally once a day  Zantac 150 oral tablet: 1 tab(s) orally 2 times a day      Allergies: No Known Allergies      Current Medications:   carvedilol 12.5 milliGRAM(s) Oral every 12 hours  chlorhexidine 4% Liquid 1 Application(s) Topical <User Schedule>  lisinopril 10 milliGRAM(s) Oral daily  simvastatin 40 milliGRAM(s) Oral at bedtime      Physical exam:  T(C): 35.9 (06-11-19 @ 06:23), Max: 36.2 (06-10-19 @ 13:33)  HR: 66 (06-10-19 @ 18:04) (66 - 66)  BP: 137/58 (06-10-19 @ 18:04) (137/58 - 137/58)  RR: 18 (06-11-19 @ 06:23) (18 - 18)  SpO2: 98% (06-10-19 @ 19:42) (98% - 98%)  GENERAL: NAD  HEAD:  Atraumatic, Normocephalic  EYES: Sclera:NL  NECK: Supple, no JVD or thyromegaly  CHEST/LUNG: Good bilateral air entry  HEART: normal S1, S2. Regular  ABDOMEN: (-) distended, (-) tender, (-) rebound, (+) BS, (-)HSM  EXTREMITIES: (-) edema  NEUROLOGY: (-) asterixis  SKIN: (-) jaundice  WILLIAM: (-) melena (-) brbpr    Data:                        9.4    11.88 )-----------( 161      ( 10 Ronnell 2019 06:21 )             28.3     MCV   RDW   HGB trend:  9.4  06-10-19 @ 06:21  10.1  06-09-19 @ 22:50        06-10    144  |  112<H>  |  85<HH>  ----------------------------<  142<H>  4.9   |  19  |  1.9<H>    Ca    9.3      10 Ronnell 2019 06:21  Mg     2.3     06-09    TPro  5.9<L>  /  Alb  3.9  /  TBili  0.2  /  DBili  x   /  AST  9   /  ALT  9   /  AlkPhos  57  06-10    Liver panel trend:  TBili 0.2   /   AST 9   /   ALT 9   /   AlkP 57   /   Tptn 5.9   /   Alb 3.9    /   DBili --      06-10  TBili 0.3   /   AST 10   /   ALT 10   /   AlkP 64   /   Tptn 6.3   /   Alb 4.0    /   DBili --      06-09        PT/INR - ( 09 Jun 2019 22:50 )   PT: 11.70 sec;   INR: 1.02 ratio             Culture - Urine (collected 10 Ronnell 2019 00:00)  Source: .Urine Clean Catch (Midstream)  Preliminary Report (11 Jun 2019 09:24):    10,000 - 49,000 CFU/mL Gram Negative Rods    <10,000 CFU/ml Normal Urogenital krishna present Gastroenterology/Hepatology Consultation    For questions and inquiries please page [phone].  For urgent matters or after 5pm and on weekends please page the fellow on call through the GI paging system.    86y Male with anemia and occult GIB.  Patient is a 86y old  Male who presents with a chief complaint of weakness (11 Jun 2019 10:03)    HPI:  86 year old male with PMH of BPH, CAD s/p CABG, DLD, GERD presents here for dizziness and Loss of consciousness for 10 seconds. As per patient and family at bedside he is feeling weak, dec PO intake since last week gradually became more weak yesterday night he walked from kitchen to restroom and sat down on the toilet bowl then for 10 seconds he didn't responded his eyes rolled up and started shaking of his arms then he returned to baseline after 10 seconds. No confusion after the episode but he didn't remember what happened Denies any fever, chills, chest pain, SOB, palpitations, nausea, vomiting, abd pain, diarrhea, runny nose, sore throat, weight loss, loss of appetite, dysuria, hematemesis, melena, hematochezia.     vitals in ED: 189/74, 80, 96.3F, 18, 98% on room air, labs showed inc WBC count of 11,000, HB 10 baseline, MCV 97 macrocytic, BUN 87, creatinine 1.8, CT head negative, UA negative, chest xray showed biapical pleural thickening which was similar to prior CT chest. received 1litre LR bolus in ED. (10 Ronnell 2019 01:08)      GI Hx:  87 yo M Hx of CAD no PCI on DAPT, CKD3, HTN, admitted for syncope. Pateient reported melena x1 day but normal brown well formed stool prior to that.  Denies pain, hematemesis, hematochezia.      Previous colonoscopy(ies): None on file but states >10 years ago  Previous EGD(s): EGD in 2013 no records.        Review of system  General:  (-) weight loss, (-) fevers  Eyes:  (-) visual changes  CV:  (-) chest pain  Resp: (-) SOB, (-) wheezing  GI: (-) abdominal pain,  (-) nausea, (-) vomiting, (-) dysphagia, (-) diarrhea, (-) constipation, (-) rectal bleeding, (-) melena, (-) hematemesis.  Neuro: (-) confusion, (-) weakness  Psych:  (-) Hallucinations  Heme:  (-) easy bruisability    Past medical/surgical Hx:  PAST MEDICAL & SURGICAL HISTORY:  BPH (benign prostatic hyperplasia)  HLD (hyperlipidemia)  HTN (hypertension)  CAD (coronary artery disease)  History of total knee replacement  H/O hernia repair  S/P CABG (coronary artery bypass graft)    Home Medications:  aspirin 81 mg oral tablet: 1 tab(s) orally once a day  carvedilol 12.5 mg oral tablet: 1 tab(s) orally 2 times a day  clopidogrel 75 mg oral tablet: 1 tab(s) orally once a day  ezetimibe-simvastatin 10 mg-40 mg oral tablet: 1 tab(s) orally once a day  lisinopril 10 mg oral tablet: 1 tab(s) orally once a day  Nitrostat 0.4 mg sublingual tablet: 1 tab(s) sublingual every 5 minutes, As Needed  Rapaflo 4 mg oral capsule: 1 cap(s) orally once a day  Zantac 150 oral tablet: 1 tab(s) orally 2 times a day      Allergies: No Known Allergies      Current Medications:   carvedilol 12.5 milliGRAM(s) Oral every 12 hours  chlorhexidine 4% Liquid 1 Application(s) Topical <User Schedule>  lisinopril 10 milliGRAM(s) Oral daily  simvastatin 40 milliGRAM(s) Oral at bedtime      Physical exam:  T(C): 35.9 (06-11-19 @ 06:23), Max: 36.2 (06-10-19 @ 13:33)  HR: 66 (06-10-19 @ 18:04) (66 - 66)  BP: 137/58 (06-10-19 @ 18:04) (137/58 - 137/58)  RR: 18 (06-11-19 @ 06:23) (18 - 18)  SpO2: 98% (06-10-19 @ 19:42) (98% - 98%)  GENERAL: NAD  HEAD:  Atraumatic, Normocephalic  EYES: Sclera:NL  NECK: Supple, no JVD or thyromegaly  CHEST/LUNG: Good bilateral air entry  HEART: normal S1, S2. Regular  ABDOMEN: (-) distended, (-) tender, (-) rebound, (+) BS, (-)HSM  EXTREMITIES: (-) edema  NEUROLOGY: (-) asterixis  SKIN: (-) jaundice  WILLIAM: (+) melena (-) brbpr    Data:                        9.4    11.88 )-----------( 161      ( 10 Ronnell 2019 06:21 )             28.3     MCV   RDW   HGB trend:  9.4  06-10-19 @ 06:21  10.1  06-09-19 @ 22:50        06-10    144  |  112<H>  |  85<HH>  ----------------------------<  142<H>  4.9   |  19  |  1.9<H>    Ca    9.3      10 Ronnell 2019 06:21  Mg     2.3     06-09    TPro  5.9<L>  /  Alb  3.9  /  TBili  0.2  /  DBili  x   /  AST  9   /  ALT  9   /  AlkPhos  57  06-10    Liver panel trend:  TBili 0.2   /   AST 9   /   ALT 9   /   AlkP 57   /   Tptn 5.9   /   Alb 3.9    /   DBili --      06-10  TBili 0.3   /   AST 10   /   ALT 10   /   AlkP 64   /   Tptn 6.3   /   Alb 4.0    /   DBili --      06-09        PT/INR - ( 09 Jun 2019 22:50 )   PT: 11.70 sec;   INR: 1.02 ratio             Culture - Urine (collected 10 Ronnell 2019 00:00)  Source: .Urine Clean Catch (Midstream)  Preliminary Report (11 Jun 2019 09:24):    10,000 - 49,000 CFU/mL Gram Negative Rods    <10,000 CFU/ml Normal Urogenital krishna present

## 2019-06-11 NOTE — PROGRESS NOTE ADULT - SUBJECTIVE AND OBJECTIVE BOX
VITALS:   T(F): 96.9  HR: 66  BP: 137/58  RR: 18  SpO2: 98%    LABS:                        9.4    11.88 )-----------( 161      ( 10 Ronnell 2019 06:21 )             28.3     0610    144  |  112<H>  |  85<HH>  ----------------------------<  142<H>  4.9   |  19  |  1.9<H>    Ca    9.3      10 Ronnell 2019 06:21  Mg     2.3     09    TPro  5.9<L>  /  Alb  3.9  /  TBili  0.2  /  DBili  x   /  AST  9   /  ALT  9   /  AlkPhos  57  10    PT/INR - ( 2019 22:50 )   PT: 11.70 sec;   INR: 1.02 ratio           Urinalysis Basic - ( 10 Ronnell 2019 00:00 )    Color: Yellow / Appearance: Clear / S.020 / pH: x  Gluc: x / Ketone: Negative  / Bili: Negative / Urobili: 0.2 mg/dL   Blood: x / Protein: Negative mg/dL / Nitrite: Negative   Leuk Esterase: Negative / RBC: x / WBC x   Sq Epi: x / Non Sq Epi: x / Bacteria: x            Culture - Urine (collected 10 Ronnell 2019 00:00)  Source: .Urine Clean Catch (Midstream)  Preliminary Report (2019 09:24):    10,000 - 49,000 CFU/mL Gram Negative Rods    <10,000 CFU/ml Normal Urogenital krishna present      CARDIAC MARKERS ( 10 Ronnell 2019 06:21 )  x     / <0.01 ng/mL / 43 U/L / x     / 1.8 ng/mL  CARDIAC MARKERS ( 2019 22:50 )  x     / <0.01 ng/mL / x     / x     / x          RADIOLOGY:    CT Head No Cont (06.10.19 @ 00:24) >  No evidence of intracranial hemorrhage, territorial infarct, or mass effect.    CT Chest No Cont (19 @ 16:14) >  1. No new, suspicious or enlarging pulmonary nodule.  2. Stable benign 0.7 cm pleural-based nodule in the right upper lobe,   unchanged since 2015.  3. Evidence of asbestos related pleural disease.     12 Lead ECG (19 @ 21:49) >  Diagnosis Line Normal sinus rhythm. T wave abnormality, consider inferior ischemia. Abnormal ECG    Transthoracic Echocardiogram (06.10.19 @ 07:07) >   1. Left ventricular ejection fraction, by visual estimation, is 50 to 55%.   2. Low-normal global left ventricular systolic function.   3. Basal inferior segment, basal and mid anterior septum, basal and mid inferior septum, and mid inferior segment are abnormal as described above.   4. Spectral Doppler shows impaired relaxation pattern of left ventricular myocardial filling (Grade I diastolic dysfunction).   5. Mild mitral regurgitation.   6. Sclerotic aortic valve with normal opening.   7. Trivial aortic regurgitation.   8. Estimated pulmonary artery systolic pressure is 37.6 mmHg assuming a right atrial pressure of 3 mmHg, which is consistent with borderline pulmonary hypertension.   9. LA volume Index is 39.0 ml/m² ml/m2.    EEG Awake or Drowsy (06.10.19 @ 12:00) >  Impression  Abnormal due to the presence of: generalized slowing as above    Clinical Correlation & Recommendations   Consistent with diffuse cerebral electrophysiological dysfunction.  Secondary to none specific cause.      PHYSICAL EXAM:  GEN: No acute distress  LUNGS: Clear to auscultation bilaterally   HEART: S1/S2 present. RRR.   ABD: Soft, non-tender, non-distended  EXT: No edema, echymosis present  NEURO: AAOX3  WILLIAM: Denisse

## 2019-06-11 NOTE — CONSULT NOTE ADULT - SUBJECTIVE AND OBJECTIVE BOX
Patient is a 86y old  Male who presents with a chief complaint of weakness (11 Jun 2019 07:11)      HPI:  86 year old male with PMH of BPH, CAD s/p CABG, DLD, GERD presents here for dizziness and Loss of consciousness for 10 seconds. As per patient and family at bedside he is feeling weak, dec PO intake since last week gradually became more weak yesterday night he walked from kitchen to restroom and sat down on the toilet bowl then for 10 seconds he didn't responded his eyes rolled up and started shaking of his arms then he returned to baseline after 10 seconds. No confusion after the episode but he didn't remember what happened Denies any fever, chills, chest pain, SOB, palpitations, nausea, vomiting, abd pain, diarrhea, runny nose, sore throat, weight loss, loss of appetite, dysuria, hematemesis, melena, hematochezia.     vitals in ED: 189/74, 80, 96.3F, 18, 98% on room air, labs showed inc WBC count of 11,000, HB 10 baseline, MCV 97 macrocytic, BUN 87, creatinine 1.8, CT head negative, UA negative, chest xray showed biapical pleural thickening which was similar to prior CT chest. received 1litre LR bolus in ED. (10 Ronnell 2019 01:08)    Consultation requested for anemia.  Patient still feels weak, but denies any pain or heart burn  Stool is still black even this morning.  Aspirin and Plavix were discontinued since yesterday.  Son at bed side.       ROS:  Negative except for:    PAST MEDICAL & SURGICAL HISTORY:  BPH (benign prostatic hyperplasia)  HLD (hyperlipidemia)  HTN (hypertension)  CAD (coronary artery disease)  History of total knee replacement  H/O hernia repair  S/P CABG (coronary artery bypass graft)      SOCIAL HISTORY:    FAMILY HISTORY:      MEDICATIONS  (STANDING):  carvedilol 12.5 milliGRAM(s) Oral every 12 hours  chlorhexidine 4% Liquid 1 Application(s) Topical <User Schedule>  lisinopril 10 milliGRAM(s) Oral daily  simvastatin 40 milliGRAM(s) Oral at bedtime    MEDICATIONS  (PRN):      Allergies    No Known Allergies    Intolerances        Vital Signs Last 24 Hrs  T(C): 35.9 (11 Jun 2019 06:23), Max: 36.2 (10 Ronnell 2019 13:33)  T(F): 96.6 (11 Jun 2019 06:23), Max: 97.2 (10 Ronnell 2019 13:33)  HR: 66 (10 Ronnell 2019 18:04) (66 - 66)  BP: 137/58 (10 Ronnell 2019 18:04) (137/58 - 137/58)  BP(mean): --  RR: 18 (11 Jun 2019 06:23) (18 - 18)  SpO2: 98% (10 Ronnell 2019 19:42) (98% - 98%)    PHYSICAL EXAM  General: adult in NAD  HEENT: Hyperpigmented raised lesions with irregular borders present over the middle of the scalp.  Neck: supple  CV: normal S1/S2 with no murmur rubs or gallops  Lungs: positive air movement b/l ant lungs,clear to auscultation, no wheezes, no rales  Abdomen: soft non-tender non-distended, no hepatosplenomegaly  Ext: no clubbing cyanosis or edema  Skin: no rashes and no petechiae  Neuro: alert and oriented X 4, no focal deficits      LABS:                          9.4    11.88 )-----------( 161      ( 10 Ronnell 2019 06:21 )             28.3         Mean Cell Volume : 97.3 fL  Mean Cell Hemoglobin : 32.3 pg  Mean Cell Hemoglobin Concentration : 33.2 g/dL  Auto Neutrophil # : 8.32 K/uL  Auto Lymphocyte # : 2.39 K/uL  Auto Monocyte # : 1.00 K/uL  Auto Eosinophil # : 0.02 K/uL  Auto Basophil # : 0.03 K/uL  Auto Neutrophil % : 70.0 %  Auto Lymphocyte % : 20.1 %  Auto Monocyte % : 8.4 %  Auto Eosinophil % : 0.2 %  Auto Basophil % : 0.3 %      Serial CBC's  06-10 @ 06:21  Hct-28.3 / Hgb-9.4 / Plat-161 / RBC-2.91 / WBC-11.88  Serial CBC's  06-09 @ 22:50  Hct-30.6 / Hgb-10.1 / Plat-168 / RBC-3.14 / WBC-11.86      06-10    144  |  112<H>  |  85<HH>  ----------------------------<  142<H>  4.9   |  19  |  1.9<H>    Ca    9.3      10 Ronnell 2019 06:21  Mg     2.3     06-09    TPro  5.9<L>  /  Alb  3.9  /  TBili  0.2  /  DBili  x   /  AST  9   /  ALT  9   /  AlkPhos  57  06-10      PT/INR - ( 09 Jun 2019 22:50 )   PT: 11.70 sec;   INR: 1.02 ratio             Iron - Total Binding Capacity.: 257 ug/dL (06-10 @ 11:05)  Ferritin, Serum: 103 ng/mL (06-10 @ 11:05)  Vitamin B12, Serum: 351 pg/mL (06-10 @ 11:05)  Folate, Serum: 11.9 ng/mL (06-10 @ 11:05)              BLOOD SMEAR INTERPRETATION:       RADIOLOGY & ADDITIONAL STUDIES:

## 2019-06-11 NOTE — CONSULT NOTE ADULT - ASSESSMENT
87 yo M with normocytic anemia, overt bleeding likely cause non variceal UGIB (NSAID-PUD vs AVMs) cannot rule out LGIB.    1)CAD on DAPT- held ASA/plavix since saturday  2)Non variceal UGIB vs LGIB  3)GERD    Rec:  - Trend and transfuse to Hgb>8  - Continue to hold DAPT  - PPI IV BID  - NPO after midnight for EGD 85 yo M with normocytic anemia, overt bleeding likely cause non variceal UGIB (NSAID-PUD vs AVMs) cannot rule out LGIB.    1)CAD on DAPT- held ASA/plavix since saturday  2)Non variceal UGIB vs LGIB  3)GERD    Rec:  - Trend and transfuse to Hgb>8  - Continue to hold DAPT  - PPI IV BID  - NPO after midnight for EGD   - If negative then colonoscopy on Thursday or Friday

## 2019-06-11 NOTE — PROGRESS NOTE ADULT - ASSESSMENT
86 year old male being evaluated for    Syncope  -acute anemia vs vasovagal  -positive orthostats  -2D echo shows EF of 50-55 %, mild MR and grade 1 DD  -EEG shows Abnormal due to the presence of: generalized slowing  -troponin negative    CAD s/p CABG   -17 years ago  -on aspirin and plavix don't know why he is on both continue for now told him to follow up with his primary and cardiology  -continue with metoprolol and lisinopril    Acute on chronic anemia  -macrocytic, positive for occult blood  -Baseline Hb is 12 current Hb is 9.4  -normal Iron studies, Vitamin b12 and folate  -Trend and transfuse to Hemoglobin > 8  -Continue to hold DAPT  -PPI IV BID  -NPO after midnight for EGD   -If negative then colonoscopy on Thursday or Friday    Dyslipidemia  -continue with simvastatin    Benign Prostatic Hyperplasia  -on rapaflo at home, Flomax here    Diet: DASH diet, NPO after midnight  Ambulate with assistance  DVT prophylaxis with SCD  GI prophylaxis with Protonix  Comes from home  Full Code 86 year old male being evaluated for    Syncope  -acute anemia vs vasovagal  -positive orthostats  -2D echo shows EF of 50-55 %, mild MR and grade 1 DD  -EEG shows Abnormal due to the presence of: generalized slowing  -troponin negative    CAD s/p CABG   -17 years ago  -was on aspirin and plavix don't know why he was on both?  -continue with metoprolol and lisinopril    Acute on chronic anemia  -macrocytic, positive for occult blood  -Baseline Hb is 12 current Hb is 9.4  -normal Iron studies, Vitamin b12 and folate  -Trend and transfuse to Hemoglobin > 8  -Continue to hold DAPT  -PPI IV BID  -NPO after midnight for EGD   -If negative then colonoscopy on Thursday or Friday    Dyslipidemia  -continue with simvastatin    Benign Prostatic Hyperplasia  -on rapaflo at home, Flomax here    Diet: DASH diet, NPO after midnight  Ambulate with assistance  DVT prophylaxis with SCD  GI prophylaxis with Protonix  Comes from home  Full Code

## 2019-06-11 NOTE — CONSULT NOTE ADULT - SUBJECTIVE AND OBJECTIVE BOX
Date of Admission:    CHIEF COMPLAINT:    HISTORY OF PRESENT ILLNESS: 86 year old male with PMH of BPH, CAD s/p CABG, DLD, GERD presents here for dizziness and Loss of consciousness for 10 seconds. As per patient and family at bedside he is feeling weak, dec PO intake since last week gradually became more weak yesterday night he walked from kitchen to restroom and sat down on the toilet bowl then for 10 seconds he didn't responded his eyes rolled up and started shaking of his arms then he returned to baseline after 10 seconds. No confusion after the episode but he didn't remember what happened Denies any fever, chills, chest pain, SOB, palpitations, nausea, vomiting, abd pain, diarrhea, runny nose, sore throat, weight loss, loss of appetite, dysuria, hematemesis, melena, hematochezia.     vitals in ED: 189/74, 80, 96.3F, 18, 98% on room air, labs showed inc WBC count of 11,000, HB 10 baseline, MCV 97 macrocytic, BUN 87, creatinine 1.8, CT head negative, UA negative, chest xray showed biapical pleural thickening which was similar to prior CT chest. received 1litre LR bolus in ED.      PAST MEDICAL & SURGICAL HISTORY:  BPH (benign prostatic hyperplasia)  HLD (hyperlipidemia)  HTN (hypertension)  CAD (coronary artery disease)  History of total knee replacement  H/O hernia repair  S/P CABG (coronary artery bypass graft)    HEALTH ISSUES - PROBLEM Dx:        FAMILY HISTORY:    Allergies    No Known Allergies    Intolerances    	  Home Medications:  aspirin 81 mg oral tablet: 1 tab(s) orally once a day (07 Apr 2018 05:01)  carvedilol 12.5 mg oral tablet: 1 tab(s) orally 2 times a day (07 Apr 2018 05:01)  clopidogrel 75 mg oral tablet: 1 tab(s) orally once a day (07 Apr 2018 05:01)  ezetimibe-simvastatin 10 mg-40 mg oral tablet: 1 tab(s) orally once a day (07 Apr 2018 05:01)  lisinopril 10 mg oral tablet: 1 tab(s) orally once a day (10 Ronnell 2019 01:44)  Nitrostat 0.4 mg sublingual tablet: 1 tab(s) sublingual every 5 minutes, As Needed (07 Apr 2018 05:01)  Rapaflo 4 mg oral capsule: 1 cap(s) orally once a day (07 Apr 2018 05:01)  Zantac 150 oral tablet: 1 tab(s) orally 2 times a day (07 Apr 2018 05:01)    MEDICATIONS  (STANDING):  carvedilol 12.5 milliGRAM(s) Oral every 12 hours  chlorhexidine 4% Liquid 1 Application(s) Topical <User Schedule>  lisinopril 10 milliGRAM(s) Oral daily  pantoprazole  Injectable 40 milliGRAM(s) IV Push every 12 hours  simvastatin 40 milliGRAM(s) Oral at bedtime      REVIEW OF SYSTEMS:  CONSTITUTIONAL: fatigue  CARDIOLOGY:  Patient denies chest pain, has shortness of breath and dizziness   RESPIRATORY: has shortness of breath,   NEUROLOGICAL: NO weakness, no focal deficits to report.   GI: no BRBPR, no Vomiting, no diarrhea.      PHYSICAL EXAM:  T(C): 36.1 (06-11-19 @ 11:47), Max: 36.2 (06-10-19 @ 20:13)  HR: --  BP: --  RR: 18 (06-11-19 @ 06:23) (18 - 18)  SpO2: 98% (06-10-19 @ 19:42) (98% - 98%)  Wt(kg): --  I&O's Summary    10 Ronnell 2019 07:01  -  11 Jun 2019 07:00  --------------------------------------------------------  IN: 360 mL / OUT: 800 mL / NET: -440 mL    11 Jun 2019 07:01  -  11 Jun 2019 19:28  --------------------------------------------------------  IN: 420 mL / OUT: 175 mL / NET: 245 mL      Daily     Daily     General Appearance: Normal	  Cardiovascular: Normal S1 S2, No JVD, 2/6 LIBBY in the aortic area.  Respiratory: Lungs clear to auscultation	  Psychiatry: A & O x 3, Mood & affect appropriate  Gastrointestinal:  Soft, Non-tender  Neurologic: Non-focal  Extremities: Normal range of motion, No clubbing, cyanosis or edema    LABS:	 	                          9.4    11.88 )-----------( 161      ( 10 Ronnell 2019 06:21 )             28.3     06-10    144  |  112<H>  |  85<HH>  ----------------------------<  142<H>  4.9   |  19  |  1.9<H>    Ca    9.3      10 Ronnell 2019 06:21  Mg     2.3     06-09    TPro  5.9<L>  /  Alb  3.9  /  TBili  0.2  /  DBili  x   /  AST  9   /  ALT  9   /  AlkPhos  57  06-10    CARDIAC MARKERS ( 10 Ronnell 2019 06:21 )  x     / <0.01 ng/mL / 43 U/L / x     / 1.8 ng/mL  CARDIAC MARKERS ( 09 Jun 2019 22:50 )  x     / <0.01 ng/mL / x     / x     / x          PT/INR - ( 09 Jun 2019 22:50 )   PT: 11.70 sec;   INR: 1.02 ratio         ASSESSMENT/PLAN: 	    1) CAD, S/P CABG stable.      Hold ASA and plavix for now      He doesn't need dual antiplatelet treatment.      If Hb remain stable consider ASA 81 mg po daily.    2) HTN stable      Continue carvidalol.       Hold lisinopril due to worsening renal function.    3) Dizziness could be due to orthostatic changes.      If renal function is stable and BP is elevated consider short acting ACE inhibitor. (captopril 6.25 mg po q12h ).

## 2019-06-11 NOTE — PROGRESS NOTE ADULT - SUBJECTIVE AND OBJECTIVE BOX
CHICHO CALLI  86y  Male      SUBJECTIVE:    feels better  Yesterday patient  noted some black stool  Progress Note:      REVIEW OF SYSTEMS:    T(C): 35.9 (06-11-19 @ 06:23), Max: 36.2 (06-10-19 @ 13:33)  HR: 66 (06-10-19 @ 18:04) (66 - 66)      BP: 137/58 (06-10-19 @ 18:04) (137/58 - 137/58)  RR: 18 (06-11-19 @ 06:23) (18 - 18)  SpO2: 98% (06-10-19 @ 19:42) (98% - 98%)  Wt(kg): --Vital Signs Last 24 Hrs  T(C): 35.9 (11 Jun 2019 06:23), Max: 36.2 (10 Ronnell 2019 13:33)  T(F): 96.6 (11 Jun 2019 06:23), Max: 97.2 (10 Ronnell 2019 13:33)  HR: 66 (10 Ronnell 2019 18:04) (66 - 66)  BP: 137/58 (10 Ronnell 2019 18:04) (137/58 - 137/58)  BP(mean): --  RR: 18 (11 Jun 2019 06:23) (18 - 18)  SpO2: 98% (10 Ronnell 2019 19:42) (98% - 98%)    PHYSICAL EXAM:  LUNGS-CLEAR  COR-REG,NL S1 &S2  ABD-SOFT,NON TENDER  NEURO-NO FOCAL    LABS:                        9.4    11.88 )-----------( 161      ( 10 Ronnell 2019 06:21 )             28.3       Occult Blood, Feces Multiple Specimen: Positive (06.10.19 @ 18:30)      RADIOLOGY:      IMPRESSION:    SYNCOPE-VASOVAGAL  MELANOTIC STOOL WITH POSITIVE OCCULT BLOOD CONSISTENT WITH ugi BLEED  [id number] DROPPED FROM 12.5 TO 9.4  CKD-STAGE 3  CAD/CABG  RIGHT TKR  BPH  S/P MELANOMA LEFT NOSTRIL  ASBESTOSIS  HYPERLIPIDEMIA        PLAN:  D/C ASA AND CLOPIDROGEL  DR. REJI LOVE GI CONSULT  MONITOR CBC AND BMP  DR. KATALINA FULLER HEME CONSULT  DR. MENDOZA -CARDIOLOGY CONSULT  NEURO CONSULT APPRECIATED  ECHO    I SPENT 30 MINUTES EXAMINING,EVALUATING,CONSELING PATIENT AND COORDINATING CARE

## 2019-06-12 ENCOUNTER — RESULT REVIEW (OUTPATIENT)
Age: 84
End: 2019-06-12

## 2019-06-12 ENCOUNTER — TRANSCRIPTION ENCOUNTER (OUTPATIENT)
Age: 84
End: 2019-06-12

## 2019-06-12 LAB
-  AMIKACIN: SIGNIFICANT CHANGE UP
-  AZTREONAM: SIGNIFICANT CHANGE UP
-  CEFEPIME: SIGNIFICANT CHANGE UP
-  CEFTAZIDIME: SIGNIFICANT CHANGE UP
-  CIPROFLOXACIN: SIGNIFICANT CHANGE UP
-  GENTAMICIN: SIGNIFICANT CHANGE UP
-  IMIPENEM: SIGNIFICANT CHANGE UP
-  LEVOFLOXACIN: SIGNIFICANT CHANGE UP
-  MEROPENEM: SIGNIFICANT CHANGE UP
-  PIPERACILLIN/TAZOBACTAM: SIGNIFICANT CHANGE UP
-  TOBRAMYCIN: SIGNIFICANT CHANGE UP
ALBUMIN SERPL ELPH-MCNC: 3.9 G/DL — SIGNIFICANT CHANGE UP (ref 3.5–5.2)
ALP SERPL-CCNC: 61 U/L — SIGNIFICANT CHANGE UP (ref 30–115)
ALT FLD-CCNC: 18 U/L — SIGNIFICANT CHANGE UP (ref 0–41)
ANION GAP SERPL CALC-SCNC: 12 MMOL/L — SIGNIFICANT CHANGE UP (ref 7–14)
AST SERPL-CCNC: 14 U/L — SIGNIFICANT CHANGE UP (ref 0–41)
BASOPHILS # BLD AUTO: 0.03 K/UL — SIGNIFICANT CHANGE UP (ref 0–0.2)
BASOPHILS NFR BLD AUTO: 0.3 % — SIGNIFICANT CHANGE UP (ref 0–1)
BILIRUB SERPL-MCNC: 0.3 MG/DL — SIGNIFICANT CHANGE UP (ref 0.2–1.2)
BUN SERPL-MCNC: 53 MG/DL — HIGH (ref 10–20)
CALCIUM SERPL-MCNC: 9.2 MG/DL — SIGNIFICANT CHANGE UP (ref 8.5–10.1)
CHLORIDE SERPL-SCNC: 113 MMOL/L — HIGH (ref 98–110)
CO2 SERPL-SCNC: 22 MMOL/L — SIGNIFICANT CHANGE UP (ref 17–32)
CREAT SERPL-MCNC: 1.6 MG/DL — HIGH (ref 0.7–1.5)
CULTURE RESULTS: SIGNIFICANT CHANGE UP
EOSINOPHIL # BLD AUTO: 0.33 K/UL — SIGNIFICANT CHANGE UP (ref 0–0.7)
EOSINOPHIL NFR BLD AUTO: 3.6 % — SIGNIFICANT CHANGE UP (ref 0–8)
GLUCOSE SERPL-MCNC: 101 MG/DL — HIGH (ref 70–99)
HCT VFR BLD CALC: 26.4 % — LOW (ref 42–52)
HGB BLD-MCNC: 8.4 G/DL — LOW (ref 14–18)
IMM GRANULOCYTES NFR BLD AUTO: 1.5 % — HIGH (ref 0.1–0.3)
INR BLD: 0.95 RATIO — SIGNIFICANT CHANGE UP (ref 0.65–1.3)
LYMPHOCYTES # BLD AUTO: 2.64 K/UL — SIGNIFICANT CHANGE UP (ref 1.2–3.4)
LYMPHOCYTES # BLD AUTO: 28.8 % — SIGNIFICANT CHANGE UP (ref 20.5–51.1)
MAGNESIUM SERPL-MCNC: 2.2 MG/DL — SIGNIFICANT CHANGE UP (ref 1.8–2.4)
MCHC RBC-ENTMCNC: 31.3 PG — HIGH (ref 27–31)
MCHC RBC-ENTMCNC: 31.8 G/DL — LOW (ref 32–37)
MCV RBC AUTO: 98.5 FL — HIGH (ref 80–94)
METHOD TYPE: SIGNIFICANT CHANGE UP
MONOCYTES # BLD AUTO: 0.75 K/UL — HIGH (ref 0.1–0.6)
MONOCYTES NFR BLD AUTO: 8.2 % — SIGNIFICANT CHANGE UP (ref 1.7–9.3)
NEUTROPHILS # BLD AUTO: 5.28 K/UL — SIGNIFICANT CHANGE UP (ref 1.4–6.5)
NEUTROPHILS NFR BLD AUTO: 57.6 % — SIGNIFICANT CHANGE UP (ref 42.2–75.2)
NRBC # BLD: 0 /100 WBCS — SIGNIFICANT CHANGE UP (ref 0–0)
ORGANISM # SPEC MICROSCOPIC CNT: SIGNIFICANT CHANGE UP
ORGANISM # SPEC MICROSCOPIC CNT: SIGNIFICANT CHANGE UP
PLATELET # BLD AUTO: 161 K/UL — SIGNIFICANT CHANGE UP (ref 130–400)
POTASSIUM SERPL-MCNC: 4.5 MMOL/L — SIGNIFICANT CHANGE UP (ref 3.5–5)
POTASSIUM SERPL-SCNC: 4.5 MMOL/L — SIGNIFICANT CHANGE UP (ref 3.5–5)
PROT SERPL-MCNC: 6 G/DL — SIGNIFICANT CHANGE UP (ref 6–8)
PROTHROM AB SERPL-ACNC: 10.9 SEC — SIGNIFICANT CHANGE UP (ref 9.95–12.87)
RBC # BLD: 2.68 M/UL — LOW (ref 4.7–6.1)
RBC # FLD: 14.4 % — SIGNIFICANT CHANGE UP (ref 11.5–14.5)
SODIUM SERPL-SCNC: 147 MMOL/L — HIGH (ref 135–146)
SPECIMEN SOURCE: SIGNIFICANT CHANGE UP
WBC # BLD: 9.17 K/UL — SIGNIFICANT CHANGE UP (ref 4.8–10.8)
WBC # FLD AUTO: 9.17 K/UL — SIGNIFICANT CHANGE UP (ref 4.8–10.8)

## 2019-06-12 PROCEDURE — 88312 SPECIAL STAINS GROUP 1: CPT | Mod: 26

## 2019-06-12 PROCEDURE — 88305 TISSUE EXAM BY PATHOLOGIST: CPT | Mod: 26

## 2019-06-12 RX ORDER — ERYTHROPOIETIN 10000 [IU]/ML
40000 INJECTION, SOLUTION INTRAVENOUS; SUBCUTANEOUS ONCE
Refills: 0 | Status: COMPLETED | OUTPATIENT
Start: 2019-06-12 | End: 2019-06-12

## 2019-06-12 RX ADMIN — CARVEDILOL PHOSPHATE 12.5 MILLIGRAM(S): 80 CAPSULE, EXTENDED RELEASE ORAL at 05:24

## 2019-06-12 RX ADMIN — PANTOPRAZOLE SODIUM 40 MILLIGRAM(S): 20 TABLET, DELAYED RELEASE ORAL at 17:48

## 2019-06-12 RX ADMIN — CARVEDILOL PHOSPHATE 12.5 MILLIGRAM(S): 80 CAPSULE, EXTENDED RELEASE ORAL at 17:48

## 2019-06-12 RX ADMIN — ERYTHROPOIETIN 40000 UNIT(S): 10000 INJECTION, SOLUTION INTRAVENOUS; SUBCUTANEOUS at 12:07

## 2019-06-12 RX ADMIN — PANTOPRAZOLE SODIUM 40 MILLIGRAM(S): 20 TABLET, DELAYED RELEASE ORAL at 05:24

## 2019-06-12 RX ADMIN — CHLORHEXIDINE GLUCONATE 1 APPLICATION(S): 213 SOLUTION TOPICAL at 05:25

## 2019-06-12 RX ADMIN — SIMVASTATIN 40 MILLIGRAM(S): 20 TABLET, FILM COATED ORAL at 21:27

## 2019-06-12 NOTE — PROGRESS NOTE ADULT - SUBJECTIVE AND OBJECTIVE BOX
VITALS:   T(F): 96.6  HR: 58  BP: 113/53  RR: 18  SpO2: 98%    LABS:                        8.4    9.17  )-----------( 161      ( 12 Jun 2019 05:39 )             26.4     06-12    147<H>  |  113<H>  |  53<H>  ----------------------------<  101<H>  4.5   |  22  |  1.6<H>    Ca    9.2      12 Jun 2019 05:39  Mg     2.2     06-12    TPro  6.0  /  Alb  3.9  /  TBili  0.3  /  DBili  x   /  AST  14  /  ALT  18  /  AlkPhos  61  06-12    PT/INR - ( 12 Jun 2019 05:39 )   PT: 10.90 sec;   INR: 0.95 ratio                   Culture - Urine (collected 10 Ronnell 2019 00:00)  Source: .Urine Clean Catch (Midstream)  Final Report (12 Jun 2019 07:02):    10,000 - 49,000 CFU/mL Pseudomonas aeruginosa    <10,000 CFU/ml Normal Urogenital krishna present  Organism: Pseudomonas aeruginosa (12 Jun 2019 07:02)  Organism: Pseudomonas aeruginosa (12 Jun 2019 07:02)          RADIOLOGY:    CT Head No Cont (06.10.19 @ 00:24) >  No evidence of intracranial hemorrhage, territorial infarct, or mass effect.    CT Chest No Cont (06.08.19 @ 16:14) >  1. No new, suspicious or enlarging pulmonary nodule.  2. Stable benign 0.7 cm pleural-based nodule in the right upper lobe,   unchanged since December 2015.  3. Evidence of asbestos related pleural disease.     12 Lead ECG (06.09.19 @ 21:49) >  Diagnosis Line Normal sinus rhythm. T wave abnormality, consider inferior ischemia. Abnormal ECG    Transthoracic Echocardiogram (06.10.19 @ 07:07) >   1. Left ventricular ejection fraction, by visual estimation, is 50 to 55%.   2. Low-normal global left ventricular systolic function.   3. Basal inferior segment, basal and mid anterior septum, basal and mid inferior septum, and mid inferior segment are abnormal as described above.   4. Spectral Doppler shows impaired relaxation pattern of left ventricular myocardial filling (Grade I diastolic dysfunction).   5. Mild mitral regurgitation.   6. Sclerotic aortic valve with normal opening.   7. Trivial aortic regurgitation.   8. Estimated pulmonary artery systolic pressure is 37.6 mmHg assuming a right atrial pressure of 3 mmHg, which is consistent with borderline pulmonary hypertension.   9. LA volume Index is 39.0 ml/m² ml/m2.    EEG Awake or Drowsy (06.10.19 @ 12:00) >  Impression  Abnormal due to the presence of: generalized slowing as above    Clinical Correlation & Recommendations   Consistent with diffuse cerebral electrophysiological dysfunction.  Secondary to none specific cause.      PHYSICAL EXAM:  GEN: looks weak  HEENT: scalp lesion, pale conjuctiva  LUNGS: Clear to auscultation bilaterally   HEART: S1/S2 present. RRR.   ABD: Soft, non-tender, non-distended  EXT: No edema  NEURO: AAOX3

## 2019-06-12 NOTE — PROGRESS NOTE ADULT - SUBJECTIVE AND OBJECTIVE BOX
CALLI VALENTINO  86y  Male      SUBJECTIVE:  no c/o;no bm last night    Progress Note:      REVIEW OF SYSTEMS:    T(C): 36.4 (06-12-19 @ 05:35), Max: 36.4 (06-11-19 @ 20:24)  HR: --  BP: --  RR: --  SpO2: 98% (06-11-19 @ 20:01) (98% - 98%)  Wt(kg): --Vital Signs Last 24 Hrs  T(C): 36.4 (12 Jun 2019 05:35), Max: 36.4 (11 Jun 2019 20:24)  T(F): 97.6 (12 Jun 2019 05:35), Max: 97.6 (12 Jun 2019 05:35)  HR: --  BP: --  BP(mean): --  RR: --  SpO2: 98% (11 Jun 2019 20:01) (98% - 98%)    PHYSICAL EXAM:  LUNGS-CLEAR  COR-REG,NL S1 &S2  ABD-SOFT,NON TENDER    LABS:                          8.4    9.17  )-----------( 161      ( 12 Jun 2019 05:39 )             26.4   06-12    147<H>  |  113<H>  |  53<H>  ----------------------------<  101<H>  4.5   |  22  |  1.6<H>    Ca    9.2      12 Jun 2019 05:39  Mg     2.2     06-12    TPro  6.0  /  Alb  3.9  /  TBili  0.3  /  DBili  x   /  AST  14  /  ALT  18  /  AlkPhos  61  06-12    RADIOLOGY:            IMPRESSION:    SYNCOPE-VASOVAGAL  MELANOTIC STOOL WITH POSITIVE OCCULT BLOOD CONSISTENT WITH UGI  BLEED  [id number] DROPPED FROM 12.5 TO 8.4  CKD-STAGE 3  CAD/CABG  RIGHT TKR  BPH  S/P MELANOMA LEFT NOSTRIL  ASBESTOSIS  HYPERLIPIDEMIA        PLAN:  OFF  ASA AND CLOPIDROGEL  DR. REJI LOVE GI CONSULT-EGD TODAY  MONITOR CBC AND BMP  DR. KATALINA FULLER HEME CONSULT-APPTRCIATED  DR. AKHTAR -CARDIOLOGY CONSULT-APPRECIATED

## 2019-06-12 NOTE — PROGRESS NOTE ADULT - ASSESSMENT
86 year old male being evaluated for    Syncope  -acute anemia vs vasovagal  -positive orthostatics  -2D echo shows EF of 50-55 %, mild MR and grade 1 DD  -EEG shows Abnormal due to the presence of: generalized slowing  -troponin negative    CAD s/p CABG   - 17 years ago  - He doesn't need dual antiplatelet treatment. If Hb remain stable will start Aspirin 81 mg oral daily after the endoscopy today.  - continue with metoprolol and lisinopril    Acute on chronic anemia  -macrocytic, positive for occult blood  -Baseline Hb is 12 current Hb is 8.4  -normal Iron studies, Vitamin b12 and folate  -Trend and transfuse to Hemoglobin > 8  -Continue to hold DAPT  -PPI IV BID  -Likely going for EGD, has been scheduled at 245 pm today   -If negative then colonoscopy on Thursday or Friday    Dyslipidemia  -continue with simvastatin    Benign Prostatic Hyperplasia  -on rapaflo at home, Flomax here    Hypertension  - stable  - Continue carvidalol and holding lisinopril as per cardiologist  - BP: 113/53    Ambulate with assistance  DVT prophylaxis with SCD  GI prophylaxis with Protonix  Comes from home  Full Code

## 2019-06-13 LAB
ALBUMIN SERPL ELPH-MCNC: 3.8 G/DL — SIGNIFICANT CHANGE UP (ref 3.5–5.2)
ALP SERPL-CCNC: 70 U/L — SIGNIFICANT CHANGE UP (ref 30–115)
ALT FLD-CCNC: 15 U/L — SIGNIFICANT CHANGE UP (ref 0–41)
ANION GAP SERPL CALC-SCNC: 11 MMOL/L — SIGNIFICANT CHANGE UP (ref 7–14)
AST SERPL-CCNC: 13 U/L — SIGNIFICANT CHANGE UP (ref 0–41)
BASOPHILS # BLD AUTO: 0.03 K/UL — SIGNIFICANT CHANGE UP (ref 0–0.2)
BASOPHILS NFR BLD AUTO: 0.3 % — SIGNIFICANT CHANGE UP (ref 0–1)
BILIRUB SERPL-MCNC: 0.4 MG/DL — SIGNIFICANT CHANGE UP (ref 0.2–1.2)
BUN SERPL-MCNC: 35 MG/DL — HIGH (ref 10–20)
CALCIUM SERPL-MCNC: 9.1 MG/DL — SIGNIFICANT CHANGE UP (ref 8.5–10.1)
CHLORIDE SERPL-SCNC: 108 MMOL/L — SIGNIFICANT CHANGE UP (ref 98–110)
CO2 SERPL-SCNC: 23 MMOL/L — SIGNIFICANT CHANGE UP (ref 17–32)
CREAT SERPL-MCNC: 1.6 MG/DL — HIGH (ref 0.7–1.5)
EOSINOPHIL # BLD AUTO: 0.3 K/UL — SIGNIFICANT CHANGE UP (ref 0–0.7)
EOSINOPHIL NFR BLD AUTO: 3.3 % — SIGNIFICANT CHANGE UP (ref 0–8)
GLUCOSE SERPL-MCNC: 108 MG/DL — HIGH (ref 70–99)
HCT VFR BLD CALC: 26.3 % — LOW (ref 42–52)
HGB BLD-MCNC: 8.5 G/DL — LOW (ref 14–18)
IMM GRANULOCYTES NFR BLD AUTO: 1.2 % — HIGH (ref 0.1–0.3)
INR BLD: 0.96 RATIO — SIGNIFICANT CHANGE UP (ref 0.65–1.3)
LYMPHOCYTES # BLD AUTO: 2.16 K/UL — SIGNIFICANT CHANGE UP (ref 1.2–3.4)
LYMPHOCYTES # BLD AUTO: 23.4 % — SIGNIFICANT CHANGE UP (ref 20.5–51.1)
MAGNESIUM SERPL-MCNC: 2.1 MG/DL — SIGNIFICANT CHANGE UP (ref 1.8–2.4)
MCHC RBC-ENTMCNC: 32 PG — HIGH (ref 27–31)
MCHC RBC-ENTMCNC: 32.3 G/DL — SIGNIFICANT CHANGE UP (ref 32–37)
MCV RBC AUTO: 98.9 FL — HIGH (ref 80–94)
MONOCYTES # BLD AUTO: 0.82 K/UL — HIGH (ref 0.1–0.6)
MONOCYTES NFR BLD AUTO: 8.9 % — SIGNIFICANT CHANGE UP (ref 1.7–9.3)
NEUTROPHILS # BLD AUTO: 5.81 K/UL — SIGNIFICANT CHANGE UP (ref 1.4–6.5)
NEUTROPHILS NFR BLD AUTO: 62.9 % — SIGNIFICANT CHANGE UP (ref 42.2–75.2)
NRBC # BLD: 0 /100 WBCS — SIGNIFICANT CHANGE UP (ref 0–0)
PLATELET # BLD AUTO: 166 K/UL — SIGNIFICANT CHANGE UP (ref 130–400)
POTASSIUM SERPL-MCNC: 4.4 MMOL/L — SIGNIFICANT CHANGE UP (ref 3.5–5)
POTASSIUM SERPL-SCNC: 4.4 MMOL/L — SIGNIFICANT CHANGE UP (ref 3.5–5)
PROT SERPL-MCNC: 6 G/DL — SIGNIFICANT CHANGE UP (ref 6–8)
PROTHROM AB SERPL-ACNC: 11.1 SEC — SIGNIFICANT CHANGE UP (ref 9.95–12.87)
RBC # BLD: 2.66 M/UL — LOW (ref 4.7–6.1)
RBC # FLD: 14.5 % — SIGNIFICANT CHANGE UP (ref 11.5–14.5)
SODIUM SERPL-SCNC: 142 MMOL/L — SIGNIFICANT CHANGE UP (ref 135–146)
WBC # BLD: 9.23 K/UL — SIGNIFICANT CHANGE UP (ref 4.8–10.8)
WBC # FLD AUTO: 9.23 K/UL — SIGNIFICANT CHANGE UP (ref 4.8–10.8)

## 2019-06-13 RX ADMIN — SIMVASTATIN 40 MILLIGRAM(S): 20 TABLET, FILM COATED ORAL at 21:15

## 2019-06-13 RX ADMIN — CHLORHEXIDINE GLUCONATE 1 APPLICATION(S): 213 SOLUTION TOPICAL at 05:55

## 2019-06-13 RX ADMIN — PANTOPRAZOLE SODIUM 40 MILLIGRAM(S): 20 TABLET, DELAYED RELEASE ORAL at 05:55

## 2019-06-13 RX ADMIN — CARVEDILOL PHOSPHATE 12.5 MILLIGRAM(S): 80 CAPSULE, EXTENDED RELEASE ORAL at 17:45

## 2019-06-13 RX ADMIN — CARVEDILOL PHOSPHATE 12.5 MILLIGRAM(S): 80 CAPSULE, EXTENDED RELEASE ORAL at 05:55

## 2019-06-13 RX ADMIN — PANTOPRAZOLE SODIUM 40 MILLIGRAM(S): 20 TABLET, DELAYED RELEASE ORAL at 17:46

## 2019-06-13 NOTE — PROGRESS NOTE ADULT - ASSESSMENT
86 year old male with PMH of BPH, CAD s/p CABG, DLD, GERD presents here for dizziness and Loss of consciousness for 10 seconds.    # Syncope with positive orthostatics  - 2D echo shows EF of 50-55 %, mild MR and grade 1 DD  - EEG shows Abnormal due to the presence of: generalized slowing  - troponin negative    # CAD s/p CABG   - He doesn't need dual antiplatelet treatment. If Hb remain stable will start Aspirin 81 mg oral daily  - continue with metoprolol and lisinopril    # Acute on chronic anemia  - PPI BID PO  - EGD showed findings, follow up with GI    # Dyslipidemia  -continue with simvastatin    # Benign Prostatic Hyperplasia  -on rapaflo at home, Flomax here    # Hypertension  - stable  - Continue carvidalol and holding lisinopril as per cardiologist    Ambulate with assistance  DVT prophylaxis with SCD  GI prophylaxis with Protonix  full code  dispo Comes from home

## 2019-06-13 NOTE — PROGRESS NOTE ADULT - ASSESSMENT
Anemia secondary to GI bleeding and CKD 3  Peptic ulcer, esophageal ulcer.  Hiatus hernia.  Esophagitis was noted on endoscopy yesterday.  S/P syncope.  Coronary artery disease.  Multiple skin cancers like melanoma, squamous cell and basal cell carcinoma follwed by Dr.Penrose, dermatologist.    Hold antiplatelet agents for few more days.  Monitor CBC with platelets.  Out of bed as tolerated.  Physical therapy for ambulation.

## 2019-06-13 NOTE — PROGRESS NOTE ADULT - SUBJECTIVE AND OBJECTIVE BOX
INTERVAL HPI/OVERNIGHT EVENTS:  Patient S&E at bedside. No o/n events,     Patient comfortable.  No new complaints.  Alleges he is feeling better.    VITAL SIGNS:  T(F): 97.8 (06-13-19 @ 06:11)  HR: 59 (06-12-19 @ 17:16)  BP: 109/55 (06-12-19 @ 17:16)  RR: 18 (06-12-19 @ 20:07)  SpO2: 97% (06-12-19 @ 16:15)  Wt(kg): --    PHYSICAL EXAM:    Constitutional: NAD  Eyes: EOMI, sclera non-icteric  Neck: supple, no masses, no JVD  Respiratory: CTA b/l, good air entry b/l  Cardiovascular: RRR, no M/R/G  Gastrointestinal: soft, NTND, no masses palpable, + BS, no hepatosplenomegaly  Extremities: no c/c/e  Neurological: AAOx3      MEDICATIONS  (STANDING):  carvedilol 12.5 milliGRAM(s) Oral every 12 hours  chlorhexidine 4% Liquid 1 Application(s) Topical <User Schedule>  lisinopril 10 milliGRAM(s) Oral daily  pantoprazole  Injectable 40 milliGRAM(s) IV Push every 12 hours  simvastatin 40 milliGRAM(s) Oral at bedtime    MEDICATIONS  (PRN):      Allergies    No Known Allergies    Intolerances        LABS:                        8.5    9.23  )-----------( 166      ( 13 Jun 2019 06:53 )             26.3     06-13    142  |  108  |  35<H>  ----------------------------<  108<H>  4.4   |  23  |  1.6<H>    Ca    9.1      13 Jun 2019 06:53  Mg     2.1     06-13    TPro  6.0  /  Alb  3.8  /  TBili  0.4  /  DBili  x   /  AST  13  /  ALT  15  /  AlkPhos  70  06-13    PT/INR - ( 13 Jun 2019 06:53 )   PT: 11.10 sec;   INR: 0.96 ratio               RADIOLOGY & ADDITIONAL TESTS:  Studies reviewed.

## 2019-06-13 NOTE — CONSULT NOTE ADULT - ASSESSMENT

## 2019-06-13 NOTE — PROGRESS NOTE ADULT - SUBJECTIVE AND OBJECTIVE BOX
06-13-19 @ 13:14    CHICHOKRYSTALCALLI  86y  Male  Seen in F9, son Rajeev at bedside.   No c/o.     INTERVAL EVENTS: s/p EGD 6/12, having normal diet now.     MEDICATIONS  (STANDING):  carvedilol 12.5 milliGRAM(s) Oral every 12 hours  chlorhexidine 4% Liquid 1 Application(s) Topical <User Schedule>  lisinopril 10 milliGRAM(s) Oral daily  pantoprazole  Injectable 40 milliGRAM(s) IV Push every 12 hours  simvastatin 40 milliGRAM(s) Oral at bedtime    MEDICATIONS  (PRN):      T(C): 36.6 (06-13-19 @ 06:11), Max: 36.6 (06-13-19 @ 06:11)  HR: 59 (06-12-19 @ 17:16) (57 - 66)  BP: 109/55 (06-12-19 @ 17:16) (94/51 - 116/56)  RR: 18 (06-12-19 @ 20:07) (18 - 18)  SpO2: 97% (06-12-19 @ 16:15) (97% - 100%)  Wt(kg): --Vital Signs Last 24 Hrs  T(C): 36.6 (13 Jun 2019 06:11), Max: 36.6 (13 Jun 2019 06:11)  T(F): 97.8 (13 Jun 2019 06:11), Max: 97.8 (13 Jun 2019 06:11)  HR: 59 (12 Jun 2019 17:16) (57 - 66)  BP: 109/55 (12 Jun 2019 17:16) (94/51 - 116/56)  BP(mean): --  RR: 18 (12 Jun 2019 20:07) (18 - 18)  SpO2: 97% (12 Jun 2019 16:15) (97% - 100%)    PHYSICAL EXAM:  GENERAL:   NECK:   CHEST/LUNG:  HEART: S1  ABDOMEN:   EXTREMITIES:                           8.5    9.23  )-----------( 166      ( 13 Jun 2019 06:53 )             26.3     06-13    142  |  108  |  35<H>  ----------------------------<  108<H>  4.4   |  23  |  1.6<H>    Ca    9.1      13 Jun 2019 06:53  Mg     2.1     06-13    TPro  6.0  /  Alb  3.8  /  TBili  0.4  /  DBili  x   /  AST  13  /  ALT  15  /  AlkPhos  70  06-13      PT/INR - ( 13 Jun 2019 06:53 )   PT: 11.10 sec;   INR: 0.96 ratio               RADIOLOGY & ADDITIONAL TESTS:      ASSESSMENT / PLAN  :    ANEMIA :   UGI Bleed :   PUD :   CAD S/P CABG :   WEAKNESS : 06-13-19 @ 13:14    JAECALLI OBREGON  86y  Male  Seen in F9, son Rajeev at bedside.   No c/o.     INTERVAL EVENTS: s/p EGD 6/12, having normal diet now.     MEDICATIONS  (STANDING):  carvedilol 12.5 milliGRAM(s) Oral every 12 hours  chlorhexidine 4% Liquid 1 Application(s) Topical <User Schedule>  lisinopril 10 milliGRAM(s) Oral daily  pantoprazole  Injectable 40 milliGRAM(s) IV Push every 12 hours  simvastatin 40 milliGRAM(s) Oral at bedtime    MEDICATIONS  (PRN):      T(C): 36.6 (06-13-19 @ 06:11), Max: 36.6 (06-13-19 @ 06:11)  HR: 59 (06-12-19 @ 17:16) (57 - 66)  BP: 109/55 (06-12-19 @ 17:16) (94/51 - 116/56)  RR: 18 (06-12-19 @ 20:07) (18 - 18)  SpO2: 97% (06-12-19 @ 16:15) (97% - 100%)  Wt(kg): --Vital Signs Last 24 Hrs  T(C): 36.6 (13 Jun 2019 06:11), Max: 36.6 (13 Jun 2019 06:11)  T(F): 97.8 (13 Jun 2019 06:11), Max: 97.8 (13 Jun 2019 06:11)  HR: 59 (12 Jun 2019 17:16) (57 - 66)  BP: 109/55 (12 Jun 2019 17:16) (94/51 - 116/56)  BP(mean): --  RR: 18 (12 Jun 2019 20:07) (18 - 18)  SpO2: 97% (12 Jun 2019 16:15) (97% - 100%)    PHYSICAL EXAM:  GENERAL: NAD, comfortable.   NECK: supple, no JVD  CHEST/LUNG: BL AE. no adv sounds.   HEART: S1S2 reg. SM  ABDOMEN: soft NT, no palp mass. BS normal  EXTREMITIES: no CCE.   pale skin.   no neuro deficit.                           8.5    9.23  )-----------( 166      ( 13 Jun 2019 06:53 )             26.3     06-13    142  |  108  |  35<H>  ----------------------------<  108<H>  4.4   |  23  |  1.6<H>    Ca    9.1      13 Jun 2019 06:53  Mg     2.1     06-13    TPro  6.0  /  Alb  3.8  /  TBili  0.4  /  DBili  x   /  AST  13  /  ALT  15  /  AlkPhos  70  06-13      PT/INR - ( 13 Jun 2019 06:53 )   PT: 11.10 sec;   INR: 0.96 ratio               RADIOLOGY & ADDITIONAL TESTS:      ASSESSMENT / PLAN  :    ANEMIA : 2' UGI bleed. No active bleeding now. Cont to monitor H/H. To cont to hold ASA, plavix. [place] diet.   UGI Bleed : +ve  non bleeding. esophgitis in EGD. PPI.   PUD : as above.   CAD S/P CABG : stable. cont risk fx control.   WEAKNESS : Generalised. 2' anemia 2' UGI bleed.   S/p Recent FAINTING : c/w vasovagal post GI bleed. CT head -ve/ Being stable. Will cont to observe.   Rehab for prevention of weakness.   If cont to stable, early discharge plan.

## 2019-06-13 NOTE — PROGRESS NOTE ADULT - SUBJECTIVE AND OBJECTIVE BOX
LENGTH OF HOSPITAL STAY: 3d    CHIEF COMPLAINT:   Patient is a 86y old  Male who presents with a chief complaint of weakness (13 Jun 2019 14:47)      Overnight events:    No acute events overnight    ALLERGIES:  No Known Allergies    MEDICATIONS:  STANDING MEDICATIONS  carvedilol 12.5 milliGRAM(s) Oral every 12 hours  chlorhexidine 4% Liquid 1 Application(s) Topical <User Schedule>  lisinopril 10 milliGRAM(s) Oral daily  pantoprazole  Injectable 40 milliGRAM(s) IV Push every 12 hours  simvastatin 40 milliGRAM(s) Oral at bedtime    PRN MEDICATIONS    VITALS:   T(F): 97.2  HR: 59  BP: 109/55  RR: 18  SpO2: --    LABS:                        8.5    9.23  )-----------( 166      ( 13 Jun 2019 06:53 )             26.3     06-13    142  |  108  |  35<H>  ----------------------------<  108<H>  4.4   |  23  |  1.6<H>    Ca    9.1      13 Jun 2019 06:53  Mg     2.1     06-13    TPro  6.0  /  Alb  3.8  /  TBili  0.4  /  DBili  x   /  AST  13  /  ALT  15  /  AlkPhos  70  06-13    PT/INR - ( 13 Jun 2019 06:53 )   PT: 11.10 sec;   INR: 0.96 ratio        PHYSICAL EXAM:  GEN: No acute distress  LUNGS: Clear to auscultation bilaterally   HEART: S1/S2 present. RRR.   ABD: Soft, non-tender, non-distended. Bowel sounds present  NEURO: AAOX3

## 2019-06-13 NOTE — CONSULT NOTE ADULT - SUBJECTIVE AND OBJECTIVE BOX
HPI:  86 year old male with PMH of BPH, CAD s/p CABG, DLD, GERD presents here for dizziness and Loss of consciousness for 10 seconds. As per patient and family at bedside he is feeling weak, dec PO intake since last week gradually became more weak yesterday night he walked from kitchen to restroom and sat down on the toilet bowl then for 10 seconds he didn't responded his eyes rolled up and started shaking of his arms then he returned to baseline after 10 seconds. No confusion after the episode but he didn't remember what happened Denies any fever, chills, chest pain, SOB, palpitations, nausea, vomiting, abd pain, diarrhea, runny nose, sore throat, weight loss, loss of appetite, dysuria, hematemesis, melena, hematochezia.     vitals in ED: 189/74, 80, 96.3F, 18, 98% on room air, labs showed inc WBC count of 11,000, HB 10 baseline, MCV 97 macrocytic, BUN 87, creatinine 1.8, CT head negative, UA negative, chest xray showed biapical pleural thickening which was similar to prior CT chest. received 1litre LR bolus in ED. (10 Ronnell 2019 01:08)      PAST MEDICAL & SURGICAL HISTORY:  BPH (benign prostatic hyperplasia)  HLD (hyperlipidemia)  HTN (hypertension)  CAD (coronary artery disease)  History of total knee replacement  H/O hernia repair  S/P CABG (coronary artery bypass graft)      Hospital Course:    TODAY'S SUBJECTIVE & REVIEW OF SYMPTOMS:     Constitutional WNL   Cardio WNL   Resp WNL   GI WNL  Heme WNL  Endo WNL  Skin WNL  MSK Weakness  Neuro WNL  Cognitive WNL  Psych WNL      MEDICATIONS  (STANDING):  carvedilol 12.5 milliGRAM(s) Oral every 12 hours  chlorhexidine 4% Liquid 1 Application(s) Topical <User Schedule>  lisinopril 10 milliGRAM(s) Oral daily  pantoprazole  Injectable 40 milliGRAM(s) IV Push every 12 hours  simvastatin 40 milliGRAM(s) Oral at bedtime    MEDICATIONS  (PRN):      FAMILY HISTORY:      Allergies    No Known Allergies    Intolerances        SOCIAL HISTORY:    [  ] Etoh  [  ] Smoking  [  ] Substance abuse     Home Environment:  [  ] Home Alone  [ x ] Lives with Family  [  ] Home Health Aid    Dwelling:  [  ] Apartment  [x  ] Private House  [  ] Adult Home  [  ] Skilled Nursing Facility      [  ] Short Term  [  ] Long Term  [x  ] Stairs       Elevator [  ]    FUNCTIONAL STATUS PTA: (Check all that apply)  Ambulation: [ x  ]Independent    [  ] Dependent     [  ] Non-Ambulatory  Assistive Device: [  ] SA Cane  [  ]  Q Cane  [  ] Walker  [  ]  Wheelchair  ADL : [x  ] Independent  [  ]  Dependent       Vital Signs Last 24 Hrs  T(C): 36.2 (13 Jun 2019 14:40), Max: 36.6 (13 Jun 2019 06:11)  T(F): 97.2 (13 Jun 2019 14:40), Max: 97.8 (13 Jun 2019 06:11)  HR: 59 (12 Jun 2019 17:16) (57 - 66)  BP: 109/55 (12 Jun 2019 17:16) (94/51 - 116/56)  BP(mean): --  RR: 18 (13 Jun 2019 14:40) (18 - 18)  SpO2: 97% (12 Jun 2019 16:15) (97% - 100%)      PHYSICAL EXAM: Alert & Oriented X3  GENERAL: NAD, well-groomed, well-developed  HEAD:  Atraumatic, Normocephalic  CHEST/LUNG: Clear   HEART: S1S2+  ABDOMEN: Soft, Nontender  EXTREMITIES:  no calf tenderness    NERVOUS SYSTEM:  Cranial Nerves 2-12 intact [  ] Abnormal  [  ]  ROM: WFL all extremities [ x ]  Abnormal [  ]  Motor Strength: WFL all extremities  [x  ]  Abnormal [  ]  Sensation: intact to light touch [  ] Abnormal [  ]  Reflexes: Symmetric [  ]  Abnormal [  ]    FUNCTIONAL STATUS:  Bed Mobility: Independent [  ]  Supervision [  ]  Needs Assistance [x  ]  N/A [  ]  Transfers: Independent [  ]  Supervision [  ]  Needs Assistance [x  ]  N/A [  ]   Ambulation: Independent [  ]  Supervision [  ]  Needs Assistance [  ]  N/A [  ]  ADL: Independent [  ] Requires Assistance [  ] N/A [  ]      LABS:                        8.5    9.23  )-----------( 166      ( 13 Jun 2019 06:53 )             26.3     06-13    142  |  108  |  35<H>  ----------------------------<  108<H>  4.4   |  23  |  1.6<H>    Ca    9.1      13 Jun 2019 06:53  Mg     2.1     06-13    TPro  6.0  /  Alb  3.8  /  TBili  0.4  /  DBili  x   /  AST  13  /  ALT  15  /  AlkPhos  70  06-13    PT/INR - ( 13 Jun 2019 06:53 )   PT: 11.10 sec;   INR: 0.96 ratio               RADIOLOGY & ADDITIONAL STUDIES:    Assesment:

## 2019-06-14 LAB
HCT VFR BLD CALC: 25.3 % — LOW (ref 42–52)
HGB BLD-MCNC: 8.2 G/DL — LOW (ref 14–18)
MCHC RBC-ENTMCNC: 31.8 PG — HIGH (ref 27–31)
MCHC RBC-ENTMCNC: 32.4 G/DL — SIGNIFICANT CHANGE UP (ref 32–37)
MCV RBC AUTO: 98.1 FL — HIGH (ref 80–94)
NRBC # BLD: 1 /100 WBCS — HIGH (ref 0–0)
PLATELET # BLD AUTO: 176 K/UL — SIGNIFICANT CHANGE UP (ref 130–400)
RBC # BLD: 2.58 M/UL — LOW (ref 4.7–6.1)
RBC # FLD: 14.4 % — SIGNIFICANT CHANGE UP (ref 11.5–14.5)
SURGICAL PATHOLOGY STUDY: SIGNIFICANT CHANGE UP
WBC # BLD: 9.98 K/UL — SIGNIFICANT CHANGE UP (ref 4.8–10.8)
WBC # FLD AUTO: 9.98 K/UL — SIGNIFICANT CHANGE UP (ref 4.8–10.8)

## 2019-06-14 RX ADMIN — PANTOPRAZOLE SODIUM 40 MILLIGRAM(S): 20 TABLET, DELAYED RELEASE ORAL at 18:30

## 2019-06-14 RX ADMIN — CARVEDILOL PHOSPHATE 12.5 MILLIGRAM(S): 80 CAPSULE, EXTENDED RELEASE ORAL at 06:07

## 2019-06-14 RX ADMIN — SIMVASTATIN 40 MILLIGRAM(S): 20 TABLET, FILM COATED ORAL at 22:05

## 2019-06-14 RX ADMIN — PANTOPRAZOLE SODIUM 40 MILLIGRAM(S): 20 TABLET, DELAYED RELEASE ORAL at 06:08

## 2019-06-14 RX ADMIN — CARVEDILOL PHOSPHATE 12.5 MILLIGRAM(S): 80 CAPSULE, EXTENDED RELEASE ORAL at 18:30

## 2019-06-14 NOTE — PHYSICAL THERAPY INITIAL EVALUATION ADULT - GAIT DEVIATIONS NOTED, PT EVAL
decreased yoni/decreased weight-shifting ability/+NBOS, short shuffled steps, cues required to maintain safe posture.

## 2019-06-14 NOTE — PHYSICAL THERAPY INITIAL EVALUATION ADULT - PLANNED THERAPY INTERVENTIONS, PT EVAL
stretching/balance training/bed mobility training/gait training/transfer training/strengthening/+stair negotiation

## 2019-06-14 NOTE — PROGRESS NOTE ADULT - ASSESSMENT
86 year old male with PMH of BPH, CAD s/p CABG, DLD, GERD presents here for dizziness and Loss of consciousness for 10 seconds.    # Syncope with positive orthostatics  - 2D echo shows EF of 50-55 %, mild MR and grade 1 DD  - EEG shows Abnormal due to the presence of: generalized slowing  - troponin negative x 2    # CAD s/p CABG   - If Hb remain stable will start Aspirin 81 mg oral daily  - continue with metoprolol and lisinopril    # Acute on chronic anemia  - PPI BID PO  - EGD showed esophagitis and non-bleeding ulcer in antrum.  Biopsies taken  - Dr. Slaughter advised to continue to monitor patient for next 1-2 days  - monitor Hb    # Dyslipidemia  -continue with simvastatin    # Benign Prostatic Hyperplasia  -on rapaflo at home, Flomax here    # Hypertension  - stable  - Continue carvidilol and holding lisinopril as per cardiologist    Ambulate with assistance  DVT prophylaxis with SCD  full code  dispo Comes from home

## 2019-06-14 NOTE — PHYSICAL THERAPY INITIAL EVALUATION ADULT - CRITERIA FOR SKILLED THERAPEUTIC INTERVENTIONS
rehab potential/anticipated equipment needs at discharge/impairments found/therapy frequency/functional limitations in following categories/risk reduction/prevention/predicted duration of therapy intervention

## 2019-06-14 NOTE — PHYSICAL THERAPY INITIAL EVALUATION ADULT - IMPAIRMENTS FOUND, PT EVAL
aerobic capacity/endurance/gross motor/gait, locomotion, and balance/joint integrity and mobility/poor safety awareness/ergonomics and body mechanics/muscle strength/posture

## 2019-06-14 NOTE — PROGRESS NOTE ADULT - SUBJECTIVE AND OBJECTIVE BOX
Patient is a 86y old  Male who presents with a chief complaint of weakness (14 Jun 2019 15:16)      PAST MEDICAL & SURGICAL HISTORY:  BPH (benign prostatic hyperplasia)  HLD (hyperlipidemia)  HTN (hypertension)  CAD (coronary artery disease)  History of total knee replacement  H/O hernia repair  S/P CABG (coronary artery bypass graft)      MEDICATIONS  (STANDING):  carvedilol 12.5 milliGRAM(s) Oral every 12 hours  chlorhexidine 4% Liquid 1 Application(s) Topical <User Schedule>  lisinopril 10 milliGRAM(s) Oral daily  pantoprazole  Injectable 40 milliGRAM(s) IV Push every 12 hours  simvastatin 40 milliGRAM(s) Oral at bedtime    MEDICATIONS  (PRN):      Overnight events:    Vital Signs Last 24 Hrs  T(C): 36.6 (14 Jun 2019 13:43), Max: 36.9 (13 Jun 2019 19:56)  T(F): 97.8 (14 Jun 2019 13:43), Max: 98.4 (13 Jun 2019 19:56)  HR: 69 (14 Jun 2019 13:43) (69 - 77)  BP: 102/51 (14 Jun 2019 13:43) (102/51 - 113/54)  BP(mean): --  RR: 18 (14 Jun 2019 13:43) (18 - 18)  SpO2: --  CAPILLARY BLOOD GLUCOSE        I&O's Summary    13 Jun 2019 07:01  -  14 Jun 2019 07:00  --------------------------------------------------------  IN: 660 mL / OUT: 1450 mL / NET: -790 mL    14 Jun 2019 07:01  -  14 Jun 2019 15:46  --------------------------------------------------------  IN: 220 mL / OUT: 400 mL / NET: -180 mL        Physical Exam:    -     General : NAD, sitting up in bed    -      Cardiac: S1, S2, regular rate    -      Pulm: cta b/l     -      GI: soft, NT, +BS    -      Ext: no LE edema         Labs:                        8.5    9.23  )-----------( 166      ( 13 Jun 2019 06:53 )             26.3             06-13    142  |  108  |  35<H>  ----------------------------<  108<H>  4.4   |  23  |  1.6<H>    Ca    9.1      13 Jun 2019 06:53  Mg     2.1     06-13    TPro  6.0  /  Alb  3.8  /  TBili  0.4  /  DBili  x   /  AST  13  /  ALT  15  /  AlkPhos  70  06-13    LIVER FUNCTIONS - ( 13 Jun 2019 06:53 )  Alb: 3.8 g/dL / Pro: 6.0 g/dL / ALK PHOS: 70 U/L / ALT: 15 U/L / AST: 13 U/L / GGT: x                 PT/INR - ( 13 Jun 2019 06:53 )   PT: 11.10 sec;   INR: 0.96 ratio

## 2019-06-14 NOTE — PHYSICAL THERAPY INITIAL EVALUATION ADULT - GENERAL OBSERVATIONS, REHAB EVAL
Pt seen [id number] for a total of 35 minutes. Pt encountered sitting in chair, No apparent distress + tele, + girlfriend present bedside +chair alarm

## 2019-06-14 NOTE — PHYSICAL THERAPY INITIAL EVALUATION ADULT - LEVEL OF CONSCIOUSNESS, REHAB EVAL
Pt instructed to follow up with PCP. Assessed per Dr Benito Chan.      Roselia Ventura, MICHELLE  [phone]
alert

## 2019-06-14 NOTE — PROGRESS NOTE ADULT - SUBJECTIVE AND OBJECTIVE BOX
CALLI VALENTINO  86y  Male      SUBJECTIVE:  out of bed in chair ambulating with assistance tolerating diet still has dark stool    PAST MEDICAL & SURGICAL HISTORY:  BPH (benign prostatic hyperplasia)  HLD (hyperlipidemia)  HTN (hypertension)  CAD (coronary artery disease)  History of total knee replacement  H/O hernia repair  S/P CABG (coronary artery bypass graft)    86y    REVIEW OF SYSTEMS:    T(C): 36.6 (06-14-19 @ 13:43), Max: 36.9 (06-13-19 @ 19:56)  HR: 69 (06-14-19 @ 13:43) (69 - 77)  BP: 102/51 (06-14-19 @ 13:43) (102/51 - 113/54)  RR: 18 (06-14-19 @ 13:43) (18 - 18)  SpO2: --  Wt(kg): --Vital Signs Last 24 Hrs  T(C): 36.6 (14 Jun 2019 13:43), Max: 36.9 (13 Jun 2019 19:56)  T(F): 97.8 (14 Jun 2019 13:43), Max: 98.4 (13 Jun 2019 19:56)  HR: 69 (14 Jun 2019 13:43) (69 - 77)  BP: 102/51 (14 Jun 2019 13:43) (102/51 - 113/54)  BP(mean): --  RR: 18 (14 Jun 2019 13:43) (18 - 18)  SpO2: --    MEDICATION:  carvedilol 12.5 milliGRAM(s) Oral every 12 hours  chlorhexidine 4% Liquid 1 Application(s) Topical <User Schedule>  lisinopril 10 milliGRAM(s) Oral daily  pantoprazole  Injectable 40 milliGRAM(s) IV Push every 12 hours  simvastatin 40 milliGRAM(s) Oral at bedtime      LABS:                        8.5    9.23  )-----------( 166      ( 13 Jun 2019 06:53 )             26.3     06-13    142  |  108  |  35<H>  ----------------------------<  108<H>  4.4   |  23  |  1.6<H>    Ca    9.1      13 Jun 2019 06:53  Mg     2.1     06-13    TPro  6.0  /  Alb  3.8  /  TBili  0.4  /  DBili  x   /  AST  13  /  ALT  15  /  AlkPhos  70  06-13    < from: EGD (06.12.19 @ 15:00) >    Impressions:    Ulceration in the esophagus compatible with esophagitis, other and esophagitis,  other. (Biopsy).    Esophageal hiatal hernia.    Ulcer in the antrum. (Biopsy).    Normal mucosa in the whole examined duodenum.   < end of copied text >      RADIOLOGY:  < from: CT Head No Cont (06.10.19 @ 00:24) >  No evidence of intracranial hemorrhage, territorial infarct, or mass   effect.    < end of copied text >    < from: Xray Chest 1 View-PORTABLE IMMEDIATE (06.09.19 @ 23:32) >  Biapical pleural thickening, better seen on recent CT chest.    < end of copied text >     < from: CT Chest No Cont (06.08.19 @ 16:14) >  1. No new, suspicious or enlarging pulmonary nodule.  2. Stable benign 0.7 cm pleural-based nodule in the right upper lobe,   unchanged since December 2015.  3. Evidence of asbestos related pleural disease.   < end of copied text >    PHYSICAL EXAM:  alert in NAD  Heart rsr s1s2+  lungs clear  abdomen soft nontender bs+  extr - no edema     IMPRESSION:  upper GI bleed   healing antral ulcer & esophageal ulcer  s/p vasovagal syncope from GI bleed  anemia from UGI bleed - h/h stable - 8.5/26.3  prerenal azotemia due to dehydration improving - bun/cr 35/1.6  CAD S/P CABG X 3   CKD 3  HTN  BPH  DLD  OA S/P TKR  LUNG NODULE & ASBESTOSIS STABLE SINCE 2015    PLAN:  GI F/U NOTED  MONITOR H/H TRANSFUSE IF NEEDED  HOLD PLAVIX AND ASA  CONTINUE PANTOPRAZOLE  ADVANCE DIET REHAB F/U SNF

## 2019-06-14 NOTE — PROGRESS NOTE ADULT - SUBJECTIVE AND OBJECTIVE BOX
Patient is a 86y old  Male who presents with a chief complaint of weakness (13 Jun 2019 16:29)      HPI:  86 year old male with PMH of BPH, CAD s/p CABG, DLD, GERD presents here for dizziness and Loss of consciousness for 10 seconds. As per patient and family at bedside he is feeling weak, dec PO intake since last week gradually became more weak yesterday night he walked from kitchen to restroom and sat down on the toilet bowl then for 10 seconds he didn't responded his eyes rolled up and started shaking of his arms then he returned to baseline after 10 seconds. No confusion after the episode but he didn't remember what happened Denies any fever, chills, chest pain, SOB, palpitations, nausea, vomiting, abd pain, diarrhea, runny nose, sore throat, weight loss, loss of appetite, dysuria, hematemesis, melena, hematochezia.     vitals in ED: 189/74, 80, 96.3F, 18, 98% on room air, labs showed inc WBC count of 11,000, HB 10 baseline, MCV 97 macrocytic, BUN 87, creatinine 1.8, CT head negative, UA negative, chest xray showed biapical pleural thickening which was similar to prior CT chest. received 1litre LR bolus in ED. (10 Ronnell 2019 01:08)      INTERVAL HPI/OVERNIGHT EVENTS: Pt seen and examined at bedside, in NAD. Denies N/V/D, abdominal pain, CP, SOB, palpitations, fevers.  VS and H/H are stable  Pt feeling well no complaints.  EGD yesterdu showed non bleeing gastric ulcer.      REVIEW OF SYSTEMS:  CONSTITUTIONAL: No fever, weight loss, or fatigue  EYES: No eye pain, visual disturbances, or discharge  ENMT:  No difficulty hearing, tinnitus, vertigo; No sinus or throat pain  NECK: No pain or stiffness  BREASTS: No pain, no masses,   RESPIRATORY: No cough, wheezing, chills or hemoptysis; No shortness of breath  CARDIOVASCULAR: No chest pain, palpitations, dizziness, or leg swelling  GASTROINTESTINAL: No abdominal or epigastric pain. No nausea, vomiting, or hematemesis; No diarrhea or constipation. No melena or hematochezia.  GENITOURINARY: No dysuria, frequency, hematuria, or incontinence  NEUROLOGICAL: No headaches, memory loss, loss of strength, numbness, or tremors  SKIN: No itching, burning, rashes, or lesions   LYMPH NODES: No enlarged glands  MUSCULOSKELETAL: No joint pain or swelling; No muscle, back, or extremity pain  PSYCHIATRIC: No depression, anxiety, mood swings, or difficulty sleeping  ALLERY No hives or eczema    PHYSICAL EXAM:  GENERAL: NAD, well-groomed, well-developed  HEAD:  Atraumatic, Normocephalic  EYES: EOMI, PERRLA, conjunctiva and sclera clear  ENMT: No tonsillar erythema, exudates, or enlargement; Moist mucous membranes, Good dentition, No lesions  NECK: Supple, No JVD, Normal thyroid  NERVOUS SYSTEM:  Alert & Oriented X3, Good concentration; Motor Strength 5/5 B/L upper and lower extremities; DTRs 2+ intact and symmetric  CHEST/LUNG: Clear to percussion bilaterally; No rales, rhonchi, wheezing, or rubs  HEART: Regular rate and rhythm; No murmurs, rubs, or gallops  ABDOMEN: Soft, Nontender, Nondistended; Bowel sounds present  EXTREMITIES:  2+ Peripheral Pulses, No clubbing, cyanosis, or edema  LYMPH: No lymphadenopathy noted  SKIN: No rashes or lesions    T(C): 36.6 (06-14-19 @ 05:54), Max: 36.9 (06-13-19 @ 19:56)  HR: 77 (06-13-19 @ 17:41) (77 - 77)  BP: 113/54 (06-13-19 @ 17:41) (113/54 - 113/54)  RR: 18 (06-14-19 @ 05:54) (18 - 18)  SpO2: --  Wt(kg): --  I&O's Summary    13 Jun 2019 07:01  -  14 Jun 2019 07:00  --------------------------------------------------------  IN: 660 mL / OUT: 1450 mL / NET: -790 mL        MEDICATIONS  (STANDING):  carvedilol 12.5 milliGRAM(s) Oral every 12 hours  chlorhexidine 4% Liquid 1 Application(s) Topical <User Schedule>  lisinopril 10 milliGRAM(s) Oral daily  pantoprazole  Injectable 40 milliGRAM(s) IV Push every 12 hours  simvastatin 40 milliGRAM(s) Oral at bedtime    MEDICATIONS  (PRN):      LABS:                        8.5    9.23  )-----------( 166      ( 13 Jun 2019 06:53 )             26.3     06-13    142  |  108  |  35<H>  ----------------------------<  108<H>  4.4   |  23  |  1.6<H>    Ca    9.1      13 Jun 2019 06:53  Mg     2.1     06-13    TPro  6.0  /  Alb  3.8  /  TBili  0.4  /  DBili  x   /  AST  13  /  ALT  15  /  AlkPhos  70  06-13    PT/INR - ( 13 Jun 2019 06:53 )   PT: 11.10 sec;   INR: 0.96 ratio             CAPILLARY BLOOD GLUCOSE                  RADIOLOGY & ADDITIONAL TESTS:    Imaging Personally Reviewed:       Advance Directives:      Palliative Care:

## 2019-06-15 LAB
ALBUMIN SERPL ELPH-MCNC: 3.7 G/DL — SIGNIFICANT CHANGE UP (ref 3.5–5.2)
ALP SERPL-CCNC: 72 U/L — SIGNIFICANT CHANGE UP (ref 30–115)
ALT FLD-CCNC: 12 U/L — SIGNIFICANT CHANGE UP (ref 0–41)
ANION GAP SERPL CALC-SCNC: 12 MMOL/L — SIGNIFICANT CHANGE UP (ref 7–14)
AST SERPL-CCNC: 11 U/L — SIGNIFICANT CHANGE UP (ref 0–41)
BILIRUB SERPL-MCNC: 0.3 MG/DL — SIGNIFICANT CHANGE UP (ref 0.2–1.2)
BUN SERPL-MCNC: 33 MG/DL — HIGH (ref 10–20)
CALCIUM SERPL-MCNC: 8.9 MG/DL — SIGNIFICANT CHANGE UP (ref 8.5–10.1)
CHLORIDE SERPL-SCNC: 107 MMOL/L — SIGNIFICANT CHANGE UP (ref 98–110)
CO2 SERPL-SCNC: 23 MMOL/L — SIGNIFICANT CHANGE UP (ref 17–32)
CREAT SERPL-MCNC: 1.8 MG/DL — HIGH (ref 0.7–1.5)
GLUCOSE SERPL-MCNC: 98 MG/DL — SIGNIFICANT CHANGE UP (ref 70–99)
HCT VFR BLD CALC: 25.5 % — LOW (ref 42–52)
HGB BLD-MCNC: 8.3 G/DL — LOW (ref 14–18)
MCHC RBC-ENTMCNC: 32.2 PG — HIGH (ref 27–31)
MCHC RBC-ENTMCNC: 32.5 G/DL — SIGNIFICANT CHANGE UP (ref 32–37)
MCV RBC AUTO: 98.8 FL — HIGH (ref 80–94)
NRBC # BLD: 0 /100 WBCS — SIGNIFICANT CHANGE UP (ref 0–0)
PLATELET # BLD AUTO: 159 K/UL — SIGNIFICANT CHANGE UP (ref 130–400)
POTASSIUM SERPL-MCNC: 4.3 MMOL/L — SIGNIFICANT CHANGE UP (ref 3.5–5)
POTASSIUM SERPL-SCNC: 4.3 MMOL/L — SIGNIFICANT CHANGE UP (ref 3.5–5)
PROT SERPL-MCNC: 5.9 G/DL — LOW (ref 6–8)
RBC # BLD: 2.58 M/UL — LOW (ref 4.7–6.1)
RBC # FLD: 14.6 % — HIGH (ref 11.5–14.5)
SODIUM SERPL-SCNC: 142 MMOL/L — SIGNIFICANT CHANGE UP (ref 135–146)
WBC # BLD: 9.25 K/UL — SIGNIFICANT CHANGE UP (ref 4.8–10.8)
WBC # FLD AUTO: 9.25 K/UL — SIGNIFICANT CHANGE UP (ref 4.8–10.8)

## 2019-06-15 RX ADMIN — CARVEDILOL PHOSPHATE 12.5 MILLIGRAM(S): 80 CAPSULE, EXTENDED RELEASE ORAL at 17:22

## 2019-06-15 RX ADMIN — SIMVASTATIN 40 MILLIGRAM(S): 20 TABLET, FILM COATED ORAL at 21:59

## 2019-06-15 RX ADMIN — LISINOPRIL 10 MILLIGRAM(S): 2.5 TABLET ORAL at 06:30

## 2019-06-15 RX ADMIN — CARVEDILOL PHOSPHATE 12.5 MILLIGRAM(S): 80 CAPSULE, EXTENDED RELEASE ORAL at 06:30

## 2019-06-15 RX ADMIN — PANTOPRAZOLE SODIUM 40 MILLIGRAM(S): 20 TABLET, DELAYED RELEASE ORAL at 17:22

## 2019-06-15 RX ADMIN — PANTOPRAZOLE SODIUM 40 MILLIGRAM(S): 20 TABLET, DELAYED RELEASE ORAL at 06:32

## 2019-06-15 NOTE — PROGRESS NOTE ADULT - SUBJECTIVE AND OBJECTIVE BOX
CALLI VALENTINO  86y  Male      SUBJECTIVE:  no bm;no c/o    Progress Note:      REVIEW OF SYSTEMS:    T(C): 36.4 (06-15-19 @ 05:55), Max: 36.6 (06-14-19 @ 13:43)  HR: 67 (06-15-19 @ 05:55) (56 - 69)  BP: 129/77 (06-15-19 @ 05:55) (102/51 - 133/71)  RR: 18 (06-15-19 @ 05:55) (18 - 18)  SpO2: 100% (06-14-19 @ 18:35) (100% - 100%)  Wt(kg): --Vital Signs Last 24 Hrs  T(C): 36.4 (15 Ronnell 2019 05:55), Max: 36.6 (14 Jun 2019 13:43)  T(F): 97.5 (15 Ronnell 2019 05:55), Max: 97.8 (14 Jun 2019 13:43)  HR: 67 (15 Ronnell 2019 05:55) (56 - 69)  BP: 129/77 (15 Ronnell 2019 05:55) (102/51 - 133/71)  BP(mean): --  RR: 18 (15 Ronnell 2019 05:55) (18 - 18)  SpO2: 100% (14 Jun 2019 18:35) (100% - 100%)      PHYSICAL EXAM:  LUNGS-CLEAR  COR-REG,NL S1 &S2  ABD-SOFT,NON TENDER  LABS:                        8.3    9.25  )-----------( 159      ( 15 Ronnell 2019 05:55 )             25.5   06-15    142  |  107  |  33<H>  ----------------------------<  98  4.3   |  23  |  1.8<H>    Ca    8.9      15 Ronnell 2019 05:55    TPro  5.9<L>  /  Alb  3.7  /  TBili  0.3  /  DBili  x   /  AST  11  /  ALT  12  /  AlkPhos  72  06-15      RADIOLOGY:      IMPRESSION:  SYNCOPE-VASOVAGAL  GASTRIC ULCER  ANEMIA-HGB STABLE  CKD-STAGE 3  CAD/CABG  RIGHT TKR  BPH  S/P MELANOMA LEFT NOSTRIL  ASBESTOSIS  HYPERLIPIDEMIA        PLAN:  OFF  ASA AND CLOPIDROGEL  MONITOR CBC  SNF FOR REHAB  REHAB CONSULT FOR AMBULATION

## 2019-06-16 LAB
ANION GAP SERPL CALC-SCNC: 10 MMOL/L — SIGNIFICANT CHANGE UP (ref 7–14)
BUN SERPL-MCNC: 34 MG/DL — HIGH (ref 10–20)
CALCIUM SERPL-MCNC: 8.5 MG/DL — SIGNIFICANT CHANGE UP (ref 8.5–10.1)
CHLORIDE SERPL-SCNC: 111 MMOL/L — HIGH (ref 98–110)
CO2 SERPL-SCNC: 22 MMOL/L — SIGNIFICANT CHANGE UP (ref 17–32)
CREAT SERPL-MCNC: 1.8 MG/DL — HIGH (ref 0.7–1.5)
GLUCOSE BLDC GLUCOMTR-MCNC: 89 MG/DL — SIGNIFICANT CHANGE UP (ref 70–99)
GLUCOSE SERPL-MCNC: 143 MG/DL — HIGH (ref 70–99)
HCT VFR BLD CALC: 24.3 % — LOW (ref 42–52)
HGB BLD-MCNC: 7.9 G/DL — LOW (ref 14–18)
MCHC RBC-ENTMCNC: 32.5 G/DL — SIGNIFICANT CHANGE UP (ref 32–37)
MCHC RBC-ENTMCNC: 32.5 PG — HIGH (ref 27–31)
MCV RBC AUTO: 100 FL — HIGH (ref 80–94)
NRBC # BLD: 0 /100 WBCS — SIGNIFICANT CHANGE UP (ref 0–0)
PLATELET # BLD AUTO: 193 K/UL — SIGNIFICANT CHANGE UP (ref 130–400)
POTASSIUM SERPL-MCNC: 4.9 MMOL/L — SIGNIFICANT CHANGE UP (ref 3.5–5)
POTASSIUM SERPL-SCNC: 4.9 MMOL/L — SIGNIFICANT CHANGE UP (ref 3.5–5)
RBC # BLD: 2.43 M/UL — LOW (ref 4.7–6.1)
RBC # FLD: 15.3 % — HIGH (ref 11.5–14.5)
SODIUM SERPL-SCNC: 143 MMOL/L — SIGNIFICANT CHANGE UP (ref 135–146)
WBC # BLD: 8.5 K/UL — SIGNIFICANT CHANGE UP (ref 4.8–10.8)
WBC # FLD AUTO: 8.5 K/UL — SIGNIFICANT CHANGE UP (ref 4.8–10.8)

## 2019-06-16 PROCEDURE — 93010 ELECTROCARDIOGRAM REPORT: CPT

## 2019-06-16 RX ADMIN — SIMVASTATIN 40 MILLIGRAM(S): 20 TABLET, FILM COATED ORAL at 21:51

## 2019-06-16 RX ADMIN — CHLORHEXIDINE GLUCONATE 1 APPLICATION(S): 213 SOLUTION TOPICAL at 05:35

## 2019-06-16 RX ADMIN — LISINOPRIL 10 MILLIGRAM(S): 2.5 TABLET ORAL at 05:30

## 2019-06-16 RX ADMIN — PANTOPRAZOLE SODIUM 40 MILLIGRAM(S): 20 TABLET, DELAYED RELEASE ORAL at 05:30

## 2019-06-16 RX ADMIN — PANTOPRAZOLE SODIUM 40 MILLIGRAM(S): 20 TABLET, DELAYED RELEASE ORAL at 17:23

## 2019-06-16 NOTE — PROGRESS NOTE ADULT - SUBJECTIVE AND OBJECTIVE BOX
CALLI VALENTINO  86y  Male      SUBJECTIVE:    no c/o  Progress Note:      REVIEW OF SYSTEMS:    T(C): 36.7 (06-16-19 @ 05:27), Max: 36.7 (06-16-19 @ 05:27)  HR: 68 (06-15-19 @ 17:21) (65 - 68)  BP: 132/60 (06-15-19 @ 17:21) (101/53 - 132/60)  RR: 18 (06-16-19 @ 05:27) (18 - 18)  SpO2: 98% (06-15-19 @ 17:21) (98% - 98%)  Wt(kg): --Vital Signs Last 24 Hrs  T(C): 36.7 (16 Jun 2019 05:27), Max: 36.7 (16 Jun 2019 05:27)  T(F): 98 (16 Jun 2019 05:27), Max: 98 (16 Jun 2019 05:27)  HR: 68 (15 Ronnell 2019 17:21) (65 - 68)  BP: 132/60 (15 Ronnell 2019 17:21) (101/53 - 132/60)  BP(mean): --  RR: 18 (16 Jun 2019 05:27) (18 - 18)  SpO2: 98% (15 Ronnell 2019 17:21) (98% - 98%)    PHYSICAL EXAM:    LUNGS-CLEAR  COR-REG,NL S1 &S2  ABD-SOFT,NON TENDERLABS:  LABS:                        8.3    9.25  )-----------( 159      ( 15 Ronnell 2019 05:55 )             25.5       RADIOLOGY    IMPRESSION:  SYNCOPE-VASOVAGAL  GASTRIC ULCER  ANEMIA-HGB STABLE  CKD-STAGE 3  CAD/CABG  RIGHT TKR  BPH  S/P MELANOMA LEFT NOSTRIL  ASBESTOSIS  HYPERLIPIDEMIA        PLAN:  OFF  ASA AND CLOPIDROGEL  MONITOR CBC  DISCHARGE PLANNING FOR REHAB AT SNF

## 2019-06-17 LAB
ALBUMIN SERPL ELPH-MCNC: 3.6 G/DL — SIGNIFICANT CHANGE UP (ref 3.5–5.2)
ALP SERPL-CCNC: 67 U/L — SIGNIFICANT CHANGE UP (ref 30–115)
ALT FLD-CCNC: 11 U/L — SIGNIFICANT CHANGE UP (ref 0–41)
ANION GAP SERPL CALC-SCNC: 10 MMOL/L — SIGNIFICANT CHANGE UP (ref 7–14)
AST SERPL-CCNC: 11 U/L — SIGNIFICANT CHANGE UP (ref 0–41)
BILIRUB SERPL-MCNC: 0.3 MG/DL — SIGNIFICANT CHANGE UP (ref 0.2–1.2)
BUN SERPL-MCNC: 29 MG/DL — HIGH (ref 10–20)
CALCIUM SERPL-MCNC: 8.7 MG/DL — SIGNIFICANT CHANGE UP (ref 8.5–10.1)
CHLORIDE SERPL-SCNC: 111 MMOL/L — HIGH (ref 98–110)
CO2 SERPL-SCNC: 23 MMOL/L — SIGNIFICANT CHANGE UP (ref 17–32)
CREAT SERPL-MCNC: 1.7 MG/DL — HIGH (ref 0.7–1.5)
GLUCOSE SERPL-MCNC: 94 MG/DL — SIGNIFICANT CHANGE UP (ref 70–99)
HCT VFR BLD CALC: 24.2 % — LOW (ref 42–52)
HGB BLD-MCNC: 7.8 G/DL — LOW (ref 14–18)
MCHC RBC-ENTMCNC: 32 PG — HIGH (ref 27–31)
MCHC RBC-ENTMCNC: 32.2 G/DL — SIGNIFICANT CHANGE UP (ref 32–37)
MCV RBC AUTO: 99.2 FL — HIGH (ref 80–94)
NRBC # BLD: 0 /100 WBCS — SIGNIFICANT CHANGE UP (ref 0–0)
PLATELET # BLD AUTO: 193 K/UL — SIGNIFICANT CHANGE UP (ref 130–400)
POTASSIUM SERPL-MCNC: 4.4 MMOL/L — SIGNIFICANT CHANGE UP (ref 3.5–5)
POTASSIUM SERPL-SCNC: 4.4 MMOL/L — SIGNIFICANT CHANGE UP (ref 3.5–5)
PROT SERPL-MCNC: 5.7 G/DL — LOW (ref 6–8)
RBC # BLD: 2.44 M/UL — LOW (ref 4.7–6.1)
RBC # FLD: 15.4 % — HIGH (ref 11.5–14.5)
SODIUM SERPL-SCNC: 144 MMOL/L — SIGNIFICANT CHANGE UP (ref 135–146)
WBC # BLD: 8.99 K/UL — SIGNIFICANT CHANGE UP (ref 4.8–10.8)
WBC # FLD AUTO: 8.99 K/UL — SIGNIFICANT CHANGE UP (ref 4.8–10.8)

## 2019-06-17 RX ADMIN — CHLORHEXIDINE GLUCONATE 1 APPLICATION(S): 213 SOLUTION TOPICAL at 05:49

## 2019-06-17 RX ADMIN — PANTOPRAZOLE SODIUM 40 MILLIGRAM(S): 20 TABLET, DELAYED RELEASE ORAL at 05:50

## 2019-06-17 RX ADMIN — SIMVASTATIN 40 MILLIGRAM(S): 20 TABLET, FILM COATED ORAL at 21:19

## 2019-06-17 RX ADMIN — CARVEDILOL PHOSPHATE 12.5 MILLIGRAM(S): 80 CAPSULE, EXTENDED RELEASE ORAL at 17:44

## 2019-06-17 RX ADMIN — PANTOPRAZOLE SODIUM 40 MILLIGRAM(S): 20 TABLET, DELAYED RELEASE ORAL at 17:44

## 2019-06-17 NOTE — SWALLOW BEDSIDE ASSESSMENT ADULT - SWALLOW EVAL: RECOMMENDED FEEDING/EATING TECHNIQUES
1:1 feed/alternate food with liquid/allow for swallow between intakes/small sips/bites/maintain upright posture during/after eating for 30 mins/oral hygiene
alternate food with liquid/maintain upright posture during/after eating for 30 mins/oral hygiene/allow for swallow between intakes/position upright (90 degrees)/small sips/bites

## 2019-06-17 NOTE — PROGRESS NOTE ADULT - ASSESSMENT
86 year old male with PMH of BPH, CAD s/p CABG, DLD, GERD presents here for dizziness and Loss of consciousness for 10 seconds.    Syncope with positive orthostatics  - 2D echo shows EF of 50-55 %, mild MR and grade 1 DD  - EEG shows Abnormal due to the presence of: generalized slowing  - troponin negative    CAD s/p CABG   - If Hb remain stable will start Aspirin 81 mg oral daily on discharge, on hold for now  - continue with metoprolol and lisinopril    Acute on chronic anemia  - PPI twice daily  - EGD showed esophagitis and non-bleeding ulcer in antrum.  Biopsies taken  - Dr. Slaughter advised to continue to monitor patient   - Hb 7.8, colonoscopy likely on wednesday    Dyslipidemia  -continue with simvastatin    Benign Prostatic Hyperplasia  -on rapaflo at home, Flomax here    Hypertension  - stable  - Continue carvidilol and holding lisinopril as per cardiologist    Ambulate with assistance  DVT prophylaxis with SCD  full code  comes from home

## 2019-06-17 NOTE — PROGRESS NOTE ADULT - SUBJECTIVE AND OBJECTIVE BOX
Patient is a 86y old  Male who presents with a chief complaint of weakness (17 Jun 2019 12:37)      HPI:  86 year old male with PMH of BPH, CAD s/p CABG, DLD, GERD presents here for dizziness and Loss of consciousness for 10 seconds. As per patient and family at bedside he is feeling weak, dec PO intake since last week gradually became more weak yesterday night he walked from kitchen to restroom and sat down on the toilet bowl then for 10 seconds he didn't responded his eyes rolled up and started shaking of his arms then he returned to baseline after 10 seconds. No confusion after the episode but he didn't remember what happened Denies any fever, chills, chest pain, SOB, palpitations, nausea, vomiting, abd pain, diarrhea, runny nose, sore throat, weight loss, loss of appetite, dysuria, hematemesis, melena, hematochezia.     vitals in ED: 189/74, 80, 96.3F, 18, 98% on room air, labs showed inc WBC count of 11,000, HB 10 baseline, MCV 97 macrocytic, BUN 87, creatinine 1.8, CT head negative, UA negative, chest xray showed biapical pleural thickening which was similar to prior CT chest. received 1litre LR bolus in ED. (10 Ronnell 2019 01:08)      INTERVAL HPI/OVERNIGHT EVENTS: Pt seen and examined at bedside, in NAD. Denies N/V/D, abdominal pain, CP, SOB, palpitations, fevers.   Pt  had his first BM today since admission -= melanotic (as expected)   Hgb dropped a bit to 7.9 (aS EXPECTED) BUT attendings now want a colonoscopy - the pt and I don't particularly want to do this because he is 86.    REVIEW OF SYSTEMS:  CONSTITUTIONAL: No fever, weight loss, or fatigue  EYES: No eye pain, visual disturbances, or discharge  ENMT:  No difficulty hearing, tinnitus, vertigo; No sinus or throat pain  NECK: No pain or stiffness  BREASTS: No pain, no masses,   RESPIRATORY: No cough, wheezing, chills or hemoptysis; No shortness of breath  CARDIOVASCULAR: No chest pain, palpitations, dizziness, or leg swelling  GASTROINTESTINAL: No abdominal or epigastric pain. No nausea, vomiting, or hematemesis; No diarrhea or constipation. No melena or hematochezia.  GENITOURINARY: No dysuria, frequency, hematuria, or incontinence  NEUROLOGICAL: No headaches, memory loss, loss of strength, numbness, or tremors  SKIN: No itching, burning, rashes, or lesions   LYMPH NODES: No enlarged glands  MUSCULOSKELETAL: No joint pain or swelling; No muscle, back, or extremity pain  PSYCHIATRIC: No depression, anxiety, mood swings, or difficulty sleeping  ALLERY No hives or eczema    PHYSICAL EXAM:  GENERAL: NAD, well-groomed, well-developed  HEAD:  Atraumatic, Normocephalic  EYES: EOMI, PERRLA, conjunctiva and sclera clear  ENMT: No tonsillar erythema, exudates, or enlargement; Moist mucous membranes, Good dentition, No lesions  NECK: Supple, No JVD, Normal thyroid  NERVOUS SYSTEM:  Alert & Oriented X3, Good concentration; Motor Strength 5/5 B/L upper and lower extremities; DTRs 2+ intact and symmetric  CHEST/LUNG: Clear to percussion bilaterally; No rales, rhonchi, wheezing, or rubs  HEART: Regular rate and rhythm; No murmurs, rubs, or gallops  ABDOMEN: Soft, Nontender, Nondistended; Bowel sounds present  EXTREMITIES:  2+ Peripheral Pulses, No clubbing, cyanosis, or edema  LYMPH: No lymphadenopathy noted  SKIN: No rashes or lesions    T(C): 36.6 (06-17-19 @ 05:51), Max: 36.6 (06-16-19 @ 20:21)  HR: 55 (06-16-19 @ 18:38) (55 - 55)  BP: 105/52 (06-16-19 @ 18:38) (105/52 - 105/52)  RR: 18 (06-17-19 @ 05:51) (18 - 18)  SpO2: --  Wt(kg): --  I&O's Summary    16 Jun 2019 07:01  -  17 Jun 2019 07:00  --------------------------------------------------------  IN: 420 mL / OUT: 750 mL / NET: -330 mL    17 Jun 2019 07:01  -  17 Jun 2019 14:00  --------------------------------------------------------  IN: 220 mL / OUT: 800 mL / NET: -580 mL        MEDICATIONS  (STANDING):  carvedilol 12.5 milliGRAM(s) Oral every 12 hours  chlorhexidine 4% Liquid 1 Application(s) Topical <User Schedule>  lisinopril 10 milliGRAM(s) Oral daily  pantoprazole  Injectable 40 milliGRAM(s) IV Push every 12 hours  simvastatin 40 milliGRAM(s) Oral at bedtime    MEDICATIONS  (PRN):      LABS:                        7.8    8.99  )-----------( 193      ( 17 Jun 2019 06:28 )             24.2     06-17    144  |  111<H>  |  29<H>  ----------------------------<  94  4.4   |  23  |  1.7<H>    Ca    8.7      17 Jun 2019 06:28    TPro  5.7<L>  /  Alb  3.6  /  TBili  0.3  /  DBili  x   /  AST  11  /  ALT  11  /  AlkPhos  67  06-17        CAPILLARY BLOOD GLUCOSE                  RADIOLOGY & ADDITIONAL TESTS:    Imaging Personally Reviewed:       Advance Directives:      Palliative Care:

## 2019-06-17 NOTE — SWALLOW BEDSIDE ASSESSMENT ADULT - SWALLOW EVAL: DIAGNOSIS
Baseline vocal quality sounds wet. Pt consumed and tolerated regular textures and nectar thick liquids w/ no overt s/s of aspiration/penetration. Pt  initially tolerated ~4oz of thin liquids. Delayed cough post intake noted with sounds of reflux noted before cough. Suspect Pt aspirated/ penetrated on likely reflux. Rec regular and nectar with 1:1 feed for impulsivity.
+toleration for thin liquids and soft solids w/o overt s/s penetration/aspiration. no esophageal discomfort reported.

## 2019-06-17 NOTE — PROGRESS NOTE ADULT - ASSESSMENT
Anemia secondary to GI bleed and CKD 3  Coronary artery disease.    Monitor CBC with platelets.  We will follow him as out patient in my office in 3 weeks.  Awaiting rehab placement.

## 2019-06-17 NOTE — PROGRESS NOTE ADULT - SUBJECTIVE AND OBJECTIVE BOX
INTERVAL HPI/OVERNIGHT EVENTS:  Patient S&E at bedside. Patient comfortable.  No complaints.  Awaiting rehab placement.      VITAL SIGNS:  T(F): 97.9 (06-17-19 @ 05:51)  HR: 55 (06-16-19 @ 18:38)  BP: 105/52 (06-16-19 @ 18:38)  RR: 18 (06-17-19 @ 05:51)  SpO2: 98% (06-16-19 @ 08:51)  Wt(kg): --    PHYSICAL EXAM:    Constitutional: NAD  Eyes: EOMI, sclera non-icteric  Neck: supple, no masses, no JVD  Respiratory: CTA b/l, good air entry b/l  Cardiovascular: RRR, no M/R/G  Gastrointestinal: soft, NTND, no masses palpable, + BS, no hepatosplenomegaly  Extremities: no c/c/e  Neurological: AAOx3      MEDICATIONS  (STANDING):  carvedilol 12.5 milliGRAM(s) Oral every 12 hours  chlorhexidine 4% Liquid 1 Application(s) Topical <User Schedule>  lisinopril 10 milliGRAM(s) Oral daily  pantoprazole  Injectable 40 milliGRAM(s) IV Push every 12 hours  simvastatin 40 milliGRAM(s) Oral at bedtime    MEDICATIONS  (PRN):      Allergies    No Known Allergies    Intolerances        LABS:                        7.8    8.99  )-----------( 193      ( 17 Jun 2019 06:28 )             24.2     06-17    144  |  111<H>  |  29<H>  ----------------------------<  94  4.4   |  23  |  1.7<H>    Ca    8.7      17 Jun 2019 06:28    TPro  5.7<L>  /  Alb  3.6  /  TBili  0.3  /  DBili  x   /  AST  11  /  ALT  11  /  AlkPhos  67  06-17          RADIOLOGY & ADDITIONAL TESTS:  Studies reviewed.

## 2019-06-17 NOTE — SWALLOW BEDSIDE ASSESSMENT ADULT - ASR SWALLOW ASPIRATION MONITOR
change of breathing pattern/position upright (90Y)/gurgly voice/cough/fever/throat clearing/upper respiratory infection/oral hygiene/pneumonia
pneumonia/throat clearing/oral hygiene/position upright (90Y)/cough/fever/gurgly voice

## 2019-06-17 NOTE — PROGRESS NOTE ADULT - SUBJECTIVE AND OBJECTIVE BOX
VITALS:   T(F): 97.9  HR: 55  BP: 105/52  RR: 18  SpO2: --    LABS:                        7.8    8.99  )-----------( 193      ( 17 Jun 2019 06:28 )             24.2     06-17    144  |  111<H>  |  29<H>  ----------------------------<  94  4.4   |  23  |  1.7<H>    Ca    8.7      17 Jun 2019 06:28    TPro  5.7<L>  /  Alb  3.6  /  TBili  0.3  /  DBili  x   /  AST  11  /  ALT  11  /  AlkPhos  67  06-17                  RADIOLOGY:    CT Head No Cont (06.10.19 @ 00:24) >  No evidence of intracranial hemorrhage, territorial infarct, or mass effect.    CT Chest No Cont (06.08.19 @ 16:14) >  1. No new, suspicious or enlarging pulmonary nodule.  2. Stable benign 0.7 cm pleural-based nodule in the right upper lobe,   unchanged since December 2015.  3. Evidence of asbestos related pleural disease.     12 Lead ECG (06.09.19 @ 21:49) >  Diagnosis Line Normal sinus rhythm. T wave abnormality, consider inferior ischemia. Abnormal ECG    Transthoracic Echocardiogram (06.10.19 @ 07:07) >   1. Left ventricular ejection fraction, by visual estimation, is 50 to 55%.   2. Low-normal global left ventricular systolic function.   3. Basal inferior segment, basal and mid anterior septum, basal and mid inferior septum, and mid inferior segment are abnormal as described above.   4. Spectral Doppler shows impaired relaxation pattern of left ventricular myocardial filling (Grade I diastolic dysfunction).   5. Mild mitral regurgitation.   6. Sclerotic aortic valve with normal opening.   7. Trivial aortic regurgitation.   8. Estimated pulmonary artery systolic pressure is 37.6 mmHg assuming a right atrial pressure of 3 mmHg, which is consistent with borderline pulmonary hypertension.   9. LA volume Index is 39.0 ml/m² ml/m2.    EEG Awake or Drowsy (06.10.19 @ 12:00) >  Impression  Abnormal due to the presence of: generalized slowing as above    Clinical Correlation & Recommendations   Consistent with diffuse cerebral electrophysiological dysfunction.  Secondary to none specific cause.      PHYSICAL EXAM:  GEN: looks weak  HEENT: scalp lesion, pale conjuctiva  LUNGS: Clear to auscultation bilaterally   HEART: S1/S2 present. RRR.   ABD: Soft, non-tender, non-distended  EXT: No edema  NEURO: AAOX3

## 2019-06-17 NOTE — SWALLOW BEDSIDE ASSESSMENT ADULT - SLP PERTINENT HISTORY OF CURRENT PROBLEM
Pt adm with  syncope, dizziness and LOC ~10 s. Decreased PO intake and weakness. Pt with recent Endoscopy 6/12 (+) hiatal hernia, esophagitis, ulcer in antrum , CTH(-) 6/10. CXR 6/9 Biapical pleural thickening.
Pt adm with  syncope, dizziness and LOC ~10 s. Decreased PO intake and weakness. Pt with recent Endoscopy 6/12 (+) hiatal hernia, esophagitis, ulcer in antrum , CTH(-) 6/10. CXR 6/9 Biapical pleural thickening.

## 2019-06-17 NOTE — PROGRESS NOTE ADULT - SUBJECTIVE AND OBJECTIVE BOX
06-17-19 @ 10:37    JAECALLI OBREGON  86y  Male      INTERVAL EVENTS:    MEDICATIONS  (STANDING):  carvedilol 12.5 milliGRAM(s) Oral every 12 hours  chlorhexidine 4% Liquid 1 Application(s) Topical <User Schedule>  lisinopril 10 milliGRAM(s) Oral daily  pantoprazole  Injectable 40 milliGRAM(s) IV Push every 12 hours  simvastatin 40 milliGRAM(s) Oral at bedtime    MEDICATIONS  (PRN):      T(C): 36.6 (06-17-19 @ 05:51), Max: 36.6 (06-16-19 @ 20:21)  HR: 55 (06-16-19 @ 18:38) (55 - 55)  BP: 105/52 (06-16-19 @ 18:38) (105/52 - 105/52)  RR: 18 (06-17-19 @ 05:51) (18 - 18)  SpO2: --  Wt(kg): --Vital Signs Last 24 Hrs  T(C): 36.6 (17 Jun 2019 05:51), Max: 36.6 (16 Jun 2019 20:21)  T(F): 97.9 (17 Jun 2019 05:51), Max: 97.9 (16 Jun 2019 20:21)  HR: 55 (16 Jun 2019 18:38) (55 - 55)  BP: 105/52 (16 Jun 2019 18:38) (105/52 - 105/52)  BP(mean): --  RR: 18 (17 Jun 2019 05:51) (18 - 18)  SpO2: --    PHYSICAL EXAM:  GENERAL:   NECK:   CHEST/LUNG:  HEART: S1  ABDOMEN:   EXTREMITIES:                           7.8    8.99  )-----------( 193      ( 17 Jun 2019 06:28 )             24.2     06-17    144  |  111<H>  |  29<H>  ----------------------------<  94  4.4   |  23  |  1.7<H>    Ca    8.7      17 Jun 2019 06:28    TPro  5.7<L>  /  Alb  3.6  /  TBili  0.3  /  DBili  x   /  AST  11  /  ALT  11  /  AlkPhos  67  06-17            RADIOLOGY & ADDITIONAL TESTS:      ASSESSMENT / PLAN  :    HEALTH ISSUES - PROBLEM Dx: 06-17-19 @ 10:37    MERLINDAVIDCALLI OBREGON  86y  Male  Seen lying comfortably, with son Rajeev & wife at bedside.   No c/o, but feels weak.     INTERVAL EVENTS: none    MEDICATIONS  (STANDING):  carvedilol 12.5 milliGRAM(s) Oral every 12 hours  chlorhexidine 4% Liquid 1 Application(s) Topical <User Schedule>  lisinopril 10 milliGRAM(s) Oral daily  pantoprazole  Injectable 40 milliGRAM(s) IV Push every 12 hours  simvastatin 40 milliGRAM(s) Oral at bedtime    MEDICATIONS  (PRN):      T(C): 36.6 (06-17-19 @ 05:51), Max: 36.6 (06-16-19 @ 20:21)  HR: 55 (06-16-19 @ 18:38) (55 - 55)  BP: 105/52 (06-16-19 @ 18:38) (105/52 - 105/52)  RR: 18 (06-17-19 @ 05:51) (18 - 18)  SpO2: --  Wt(kg): --Vital Signs Last 24 Hrs  T(C): 36.6 (17 Jun 2019 05:51), Max: 36.6 (16 Jun 2019 20:21)  T(F): 97.9 (17 Jun 2019 05:51), Max: 97.9 (16 Jun 2019 20:21)  HR: 55 (16 Jun 2019 18:38) (55 - 55)  BP: 105/52 (16 Jun 2019 18:38) (105/52 - 105/52)  BP(mean): --  RR: 18 (17 Jun 2019 05:51) (18 - 18)  SpO2: --    PHYSICAL EXAM:  GENERAL: Tired look but no distress.   NECK: supple w/o JVD. No bruit.   CHEST/LUNG: Clear to ausc. Good AE BL  HEART: S1 S2, SM present.   ABDOMEN: soft, BS, NT  EXTREMITIES: no CCE  No focal neuro deficit                          7.8    8.99  )-----------( 193      ( 17 Jun 2019 06:28 )             24.2     06-17    144  |  111<H>  |  29<H>  ----------------------------<  94  4.4   |  23  |  1.7<H>    Ca    8.7      17 Jun 2019 06:28    TPro  5.7<L>  /  Alb  3.6  /  TBili  0.3  /  DBili  x   /  AST  11  /  ALT  11  /  AlkPhos  67  06-17            RADIOLOGY & ADDITIONAL TESTS:      ASSESSMENT / PLAN  :        ANEMIA : 2' UGI bleed. No further melena, but didn't have much BM either. H/H slowly worsening. To be transfused 1 unit.  To cont to hold ASA, plavix. Pike diet. RE GI Eval. for ? CF  UGI Bleed : +ve  non bleeding. esophgitis in EGD. PPI to cont.    PUD : as above.   CAD S/P CABG : stable. cont risk fx control. Will transfuse to improve H/H  WEAKNESS : Generalised. 2' anemia 2' UGI bleed as well as likely deconditioning. REhab.   S/p Recent FAINTING : c/w vasovagal post GI bleed. CT head -ve/ Being stable. Will cont to observe.   Rehab for prevention of weakness.     Discussed w/ pt & family, & resident  at length.

## 2019-06-17 NOTE — SWALLOW BEDSIDE ASSESSMENT ADULT - NS SPL SWALLOW CLINIC TRIAL FT
+toleration w/o overt s/s penetration/aspiration w/ thin liquids. no esophageal discomfort reported.
suspect toleration - suspect aspiration/ penetration of reflux

## 2019-06-17 NOTE — PROGRESS NOTE ADULT - ASSESSMENT
Bleed still likely from the gastric ulcer but attendings seem to want a colonoscoy "just to be sure"	        PLAN:  Colonoscopy scheduled for Wed afternoon  Golytely bowel prep

## 2019-06-17 NOTE — SWALLOW BEDSIDE ASSESSMENT ADULT - SLP GENERAL OBSERVATIONS
Pt awake in bed, just finished 100% of lunch, breathing RA
Pt received awake, alert and oriented x3 w/ no c/o pain. pt sitting upright in bedside chair. pt requesting regular liquids. pt's girlfriend at the bedside, who reported he drank a cup of unthickened coffee today with no problems.

## 2019-06-18 LAB
ALBUMIN SERPL ELPH-MCNC: 3.8 G/DL — SIGNIFICANT CHANGE UP (ref 3.5–5.2)
ALP SERPL-CCNC: 74 U/L — SIGNIFICANT CHANGE UP (ref 30–115)
ALT FLD-CCNC: 11 U/L — SIGNIFICANT CHANGE UP (ref 0–41)
ANION GAP SERPL CALC-SCNC: 12 MMOL/L — SIGNIFICANT CHANGE UP (ref 7–14)
APTT BLD: 23.8 SEC — CRITICAL LOW (ref 27–39.2)
AST SERPL-CCNC: 10 U/L — SIGNIFICANT CHANGE UP (ref 0–41)
BASOPHILS # BLD AUTO: 0.03 K/UL — SIGNIFICANT CHANGE UP (ref 0–0.2)
BASOPHILS NFR BLD AUTO: 0.4 % — SIGNIFICANT CHANGE UP (ref 0–1)
BILIRUB SERPL-MCNC: 0.3 MG/DL — SIGNIFICANT CHANGE UP (ref 0.2–1.2)
BLD GP AB SCN SERPL QL: SIGNIFICANT CHANGE UP
BUN SERPL-MCNC: 31 MG/DL — HIGH (ref 10–20)
CALCIUM SERPL-MCNC: 9.3 MG/DL — SIGNIFICANT CHANGE UP (ref 8.5–10.1)
CHLORIDE SERPL-SCNC: 107 MMOL/L — SIGNIFICANT CHANGE UP (ref 98–110)
CO2 SERPL-SCNC: 23 MMOL/L — SIGNIFICANT CHANGE UP (ref 17–32)
CREAT SERPL-MCNC: 1.8 MG/DL — HIGH (ref 0.7–1.5)
EOSINOPHIL # BLD AUTO: 0.22 K/UL — SIGNIFICANT CHANGE UP (ref 0–0.7)
EOSINOPHIL NFR BLD AUTO: 2.8 % — SIGNIFICANT CHANGE UP (ref 0–8)
GLUCOSE SERPL-MCNC: 100 MG/DL — HIGH (ref 70–99)
HCT VFR BLD CALC: 29.1 % — LOW (ref 42–52)
HCT VFR BLD CALC: 30.5 % — LOW (ref 42–52)
HGB BLD-MCNC: 9.3 G/DL — LOW (ref 14–18)
HGB BLD-MCNC: 9.8 G/DL — LOW (ref 14–18)
IMM GRANULOCYTES NFR BLD AUTO: 1.4 % — HIGH (ref 0.1–0.3)
INR BLD: 0.92 RATIO — SIGNIFICANT CHANGE UP (ref 0.65–1.3)
LYMPHOCYTES # BLD AUTO: 1.73 K/UL — SIGNIFICANT CHANGE UP (ref 1.2–3.4)
LYMPHOCYTES # BLD AUTO: 22.2 % — SIGNIFICANT CHANGE UP (ref 20.5–51.1)
MAGNESIUM SERPL-MCNC: 2.1 MG/DL — SIGNIFICANT CHANGE UP (ref 1.8–2.4)
MCHC RBC-ENTMCNC: 31.3 PG — HIGH (ref 27–31)
MCHC RBC-ENTMCNC: 31.5 PG — HIGH (ref 27–31)
MCHC RBC-ENTMCNC: 32 G/DL — SIGNIFICANT CHANGE UP (ref 32–37)
MCHC RBC-ENTMCNC: 32.1 G/DL — SIGNIFICANT CHANGE UP (ref 32–37)
MCV RBC AUTO: 98 FL — HIGH (ref 80–94)
MCV RBC AUTO: 98.1 FL — HIGH (ref 80–94)
MONOCYTES # BLD AUTO: 0.71 K/UL — HIGH (ref 0.1–0.6)
MONOCYTES NFR BLD AUTO: 9.1 % — SIGNIFICANT CHANGE UP (ref 1.7–9.3)
NEUTROPHILS # BLD AUTO: 5 K/UL — SIGNIFICANT CHANGE UP (ref 1.4–6.5)
NEUTROPHILS NFR BLD AUTO: 64.1 % — SIGNIFICANT CHANGE UP (ref 42.2–75.2)
NRBC # BLD: 0 /100 WBCS — SIGNIFICANT CHANGE UP (ref 0–0)
NRBC # BLD: 0 /100 WBCS — SIGNIFICANT CHANGE UP (ref 0–0)
PLATELET # BLD AUTO: 197 K/UL — SIGNIFICANT CHANGE UP (ref 130–400)
PLATELET # BLD AUTO: 206 K/UL — SIGNIFICANT CHANGE UP (ref 130–400)
POTASSIUM SERPL-MCNC: 4.6 MMOL/L — SIGNIFICANT CHANGE UP (ref 3.5–5)
POTASSIUM SERPL-SCNC: 4.6 MMOL/L — SIGNIFICANT CHANGE UP (ref 3.5–5)
PROT SERPL-MCNC: 6.1 G/DL — SIGNIFICANT CHANGE UP (ref 6–8)
PROTHROM AB SERPL-ACNC: 10.6 SEC — SIGNIFICANT CHANGE UP (ref 9.95–12.87)
RBC # BLD: 2.97 M/UL — LOW (ref 4.7–6.1)
RBC # BLD: 3.11 M/UL — LOW (ref 4.7–6.1)
RBC # FLD: 15.7 % — HIGH (ref 11.5–14.5)
RBC # FLD: 15.8 % — HIGH (ref 11.5–14.5)
SODIUM SERPL-SCNC: 142 MMOL/L — SIGNIFICANT CHANGE UP (ref 135–146)
WBC # BLD: 7.8 K/UL — SIGNIFICANT CHANGE UP (ref 4.8–10.8)
WBC # BLD: 8.2 K/UL — SIGNIFICANT CHANGE UP (ref 4.8–10.8)
WBC # FLD AUTO: 7.8 K/UL — SIGNIFICANT CHANGE UP (ref 4.8–10.8)
WBC # FLD AUTO: 8.2 K/UL — SIGNIFICANT CHANGE UP (ref 4.8–10.8)

## 2019-06-18 RX ORDER — MIRTAZAPINE 45 MG/1
15 TABLET, ORALLY DISINTEGRATING ORAL AT BEDTIME
Refills: 0 | Status: COMPLETED | OUTPATIENT
Start: 2019-06-18 | End: 2019-06-18

## 2019-06-18 RX ADMIN — CARVEDILOL PHOSPHATE 12.5 MILLIGRAM(S): 80 CAPSULE, EXTENDED RELEASE ORAL at 06:08

## 2019-06-18 RX ADMIN — PANTOPRAZOLE SODIUM 40 MILLIGRAM(S): 20 TABLET, DELAYED RELEASE ORAL at 06:08

## 2019-06-18 RX ADMIN — PANTOPRAZOLE SODIUM 40 MILLIGRAM(S): 20 TABLET, DELAYED RELEASE ORAL at 17:21

## 2019-06-18 RX ADMIN — CARVEDILOL PHOSPHATE 12.5 MILLIGRAM(S): 80 CAPSULE, EXTENDED RELEASE ORAL at 17:22

## 2019-06-18 RX ADMIN — CHLORHEXIDINE GLUCONATE 1 APPLICATION(S): 213 SOLUTION TOPICAL at 06:08

## 2019-06-18 RX ADMIN — MIRTAZAPINE 15 MILLIGRAM(S): 45 TABLET, ORALLY DISINTEGRATING ORAL at 21:23

## 2019-06-18 RX ADMIN — SIMVASTATIN 40 MILLIGRAM(S): 20 TABLET, FILM COATED ORAL at 21:23

## 2019-06-18 NOTE — PROGRESS NOTE ADULT - ASSESSMENT
Anemia  most likely from gastric ulcer  H/H stable	        PLAN:    Continue current meds  Colonoscopy will hold off as per family wishes  Monitor CBC  Will follow

## 2019-06-18 NOTE — PROGRESS NOTE ADULT - SUBJECTIVE AND OBJECTIVE BOX
CALLI VALENTINO  86y  Male      SUBJECTIVE:    c/o had black stool yesterday;feels more energetic after blood Tx last night  Progress Note:      REVIEW OF S      T(C): 36.3 (06-18-19 @ 06:03), Max: 36.6 (06-17-19 @ 14:16)  HR: 59 (06-17-19 @ 20:05) (59 - 59)  BP: --  RR: 18 (06-18-19 @ 06:03) (18 - 18)  SpO2: --  Wt(kg): --Vital Signs Last 24 Hrs  T(C): 36.3 (18 Jun 2019 06:03), Max: 36.6 (17 Jun 2019 14:16)  T(F): 97.4 (18 Jun 2019 06:03), Max: 97.9 (17 Jun 2019 14:16)  HR: 59 (17 Jun 2019 20:05) (59 - 59)  BP: --  BP(mean): --  RR: 18 (18 Jun 2019 06:03) (18 - 18)  SpO2: --    PHYSICAL EXAM:  LUNGS-CLEAR  COR-REG,NL S1 &S2  ABD-SOFT,NON TENDER    LABS:                        9.8    7.80  )-----------( 206      ( 18 Jun 2019 06:07 )             30.5     06-18    142  |  107  |  31<H>  ----------------------------<  100<H>  4.6   |  23  |  1.8<H>    Ca    9.3      18 Jun 2019 06:07  Mg     2.1     06-18    TPro  6.1  /  Alb  3.8  /  TBili  0.3  /  DBili  <0.2  /  AST  10  /  ALT  11  /  AlkPhos  74  06-18    RADIOLOGY:      IMPRESSION:  SYNCOPE-VASOVAGAL  GASTRIC ULCER  RECURRENCE OF UGI BLEED-MELENA AND DROP IN HGB  ANEMIA-HGB  DROPPED  CKD-STAGE 3  CAD/CABG  RIGHT TKR  BPH  S/P MELANOMA LEFT NOSTRIL  ASBESTOSIS  HYPERLIPIDEMIA        PLAN:  AGREE WITH BLOOD TX  MONITOR CBC  RECONSULT GI-DR. LOVE  RESTART LISINOPRIL IF CREATININE SAME( BASELINE1.6-2.O)

## 2019-06-18 NOTE — PROGRESS NOTE ADULT - SUBJECTIVE AND OBJECTIVE BOX
SUBJECTIVE:    Patient is a 86y old Male who presents with a chief complaint of weakness (18 Jun 2019 14:05)    Currently admitted to medicine with the primary diagnosis of Syncope     Today is hospital day 8d. This morning he is resting comfortably in bed and reports no new issues or overnight events.     PAST MEDICAL & SURGICAL HISTORY  BPH (benign prostatic hyperplasia)  HLD (hyperlipidemia)  HTN (hypertension)  CAD (coronary artery disease)  History of total knee replacement  H/O hernia repair  S/P CABG (coronary artery bypass graft)    SOCIAL HISTORY:  Negative for smoking/alcohol/drug use.     Home Medications:  Home Medications:  aspirin 81 mg oral tablet: 1 tab(s) orally once a day (07 Apr 2018 05:01)  carvedilol 12.5 mg oral tablet: 1 tab(s) orally 2 times a day (07 Apr 2018 05:01)  clopidogrel 75 mg oral tablet: 1 tab(s) orally once a day (07 Apr 2018 05:01)  ezetimibe-simvastatin 10 mg-40 mg oral tablet: 1 tab(s) orally once a day (07 Apr 2018 05:01)  lisinopril 10 mg oral tablet: 1 tab(s) orally once a day (10 Ronnell 2019 01:44)  Nitrostat 0.4 mg sublingual tablet: 1 tab(s) sublingual every 5 minutes, As Needed (07 Apr 2018 05:01)  Rapaflo 4 mg oral capsule: 1 cap(s) orally once a day (07 Apr 2018 05:01)  Zantac 150 oral tablet: 1 tab(s) orally 2 times a day (07 Apr 2018 05:01)      ALLERGIES:  No Known Allergies    MEDICATIONS:  STANDING MEDICATIONS  carvedilol 12.5 milliGRAM(s) Oral every 12 hours  chlorhexidine 4% Liquid 1 Application(s) Topical <User Schedule>  lisinopril 10 milliGRAM(s) Oral daily  pantoprazole  Injectable 40 milliGRAM(s) IV Push every 12 hours  simvastatin 40 milliGRAM(s) Oral at bedtime    PRN MEDICATIONS    VITALS:   Vital Signs Last 24 Hrs  T(C): 35.7 (18 Jun 2019 14:52), Max: 36.4 (18 Jun 2019 14:27)  T(F): 96.3 (18 Jun 2019 14:52), Max: 97.5 (18 Jun 2019 14:27)  HR: 58 (18 Jun 2019 14:52) (58 - 63)  BP: 120/58 (18 Jun 2019 14:52) (111/53 - 120/58)  BP(mean): --  RR: 18 (18 Jun 2019 14:52) (18 - 18)  SpO2: --  CAPILLARY BLOOD GLUCOSE          LABS:                        9.8    7.80  )-----------( 206      ( 18 Jun 2019 06:07 )             30.5     06-18    142  |  107  |  31<H>  ----------------------------<  100<H>  4.6   |  23  |  1.8<H>    Ca    9.3      18 Jun 2019 06:07  Mg     2.1     06-18    TPro  6.1  /  Alb  3.8  /  TBili  0.3  /  DBili  <0.2  /  AST  10  /  ALT  11  /  AlkPhos  74  06-18    PT/INR - ( 18 Jun 2019 06:07 )   PT: 10.60 sec;   INR: 0.92 ratio         PTT - ( 18 Jun 2019 06:07 )  PTT:23.8 sec    RADIOLOGY:    PHYSICAL EXAM:  GEN: looks weak  HEENT: scalp lesion, pale conjuctiva  LUNGS: Clear to auscultation bilaterally   HEART: S1/S2 present. RRR.   ABD: Soft, non-tender, non-distended  EXT: No edema  NEURO: AAOX3

## 2019-06-18 NOTE — PROGRESS NOTE ADULT - SUBJECTIVE AND OBJECTIVE BOX
Patient is a 86y old  Male who presents with a chief complaint of weakness (17 Jun 2019 12:37)      HPI:  86 year old male with PMH of BPH, CAD s/p CABG, DLD, GERD presents here for dizziness and Loss of consciousness for 10 seconds. As per patient and family at bedside he is feeling weak, dec PO intake since last week gradually became more weak yesterday night he walked from kitchen to restroom and sat down on the toilet bowl then for 10 seconds he didn't responded his eyes rolled up and started shaking of his arms then he returned to baseline after 10 seconds. No confusion after the episode but he didn't remember what happened Denies any fever, chills, chest pain, SOB, palpitations, nausea, vomiting, abd pain, diarrhea, runny nose, sore throat, weight loss, loss of appetite, dysuria, hematemesis, melena, hematochezia.     vitals in ED: 189/74, 80, 96.3F, 18, 98% on room air, labs showed inc WBC count of 11,000, HB 10 baseline, MCV 97 macrocytic, BUN 87, creatinine 1.8, CT head negative, UA negative, chest xray showed biapical pleural thickening which was similar to prior CT chest. received 1litre LR bolus in ED. (10 Ronnell 2019 01:08)      INTERVAL HPI/OVERNIGHT EVENTS: Pt seen and examined at bedside, in NAD. Denies N/V/D, abdominal pain, CP, SOB, palpitations, fevers.   Pt  had his first BM today since admission -= melanotic (as expected)   Hgb dropped a bit to 7.9 (aS EXPECTED) BUT attendings now want a colonoscopy - the pt and I don't particularly want to do this because he is 86.  hB today after 1 unit of PRBC is 9.8.    Family at this point does not want the colonoscopy and I agree with them    REVIEW OF SYSTEMS:  CONSTITUTIONAL: No fever, weight loss, or fatigue  EYES: No eye pain, visual disturbances, or discharge  ENMT:  No difficulty hearing, tinnitus, vertigo; No sinus or throat pain  NECK: No pain or stiffness  RESPIRATORY: No cough, wheezing, chills or hemoptysis; No shortness of breath  CARDIOVASCULAR: No chest pain, palpitations, dizziness, or leg swelling  GASTROINTESTINAL: No abdominal or epigastric pain. No nausea, vomiting, or hematemesis; No diarrhea or constipation. No melena or hematochezia.  GENITOURINARY: No dysuria, frequency, hematuria, or incontinence  SKIN: No itching, burning, rashes, or lesions   LYMPH NODES: No enlarged glands  MUSCULOSKELETAL: No joint pain or swelling; No muscle, back, or extremity pain  PSYCHIATRIC: No depression, anxiety, mood swings, or difficulty sleeping  ALLERY No hives or eczema    PHYSICAL EXAM:  Vital Signs Last 24 Hrs  T(C): 36.3 (18 Jun 2019 06:03), Max: 36.6 (17 Jun 2019 14:16)  T(F): 97.4 (18 Jun 2019 06:03), Max: 97.9 (17 Jun 2019 14:16)  HR: 59 (17 Jun 2019 20:05) (59 - 59)  BP: --  BP(mean): --  RR: 18 (18 Jun 2019 06:03) (18 - 18)  SpO2: --    GENERAL: NAD, well-groomed, well-developed  HEAD:  Atraumatic, Normocephalic  EYES: EOMI, PERRLA, conjunctiva and sclera clear  ENMT: No tonsillar erythema, exudates, or enlargement; Moist mucous membranes, Good dentition, No lesions  NECK: Supple, No JVD, Normal thyroid  NERVOUS SYSTEM:  Alert & Oriented X3,   CHEST/LUNG: decreased BS  HEART: Regular rate and rhythm; No murmurs, rubs, or gallops  ABDOMEN: Soft, Nontender, Nondistended; Bowel sounds present  EXTREMITIES:  2+ Peripheral Pulses, No clubbing, cyanosis, or edema  LYMPH: No lymphadenopathy noted  SKIN: No rashes or lesions            MEDICATIONS  (STANDING):  carvedilol 12.5 milliGRAM(s) Oral every 12 hours  chlorhexidine 4% Liquid 1 Application(s) Topical <User Schedule>  lisinopril 10 milliGRAM(s) Oral daily  pantoprazole  Injectable 40 milliGRAM(s) IV Push every 12 hours  simvastatin 40 milliGRAM(s) Oral at bedtime    MEDICATIONS  (PRN):      LABS:                          9.8    7.80  )-----------( 206      ( 18 Jun 2019 06:07 )             30.5     06-18    142  |  107  |  31<H>  ----------------------------<  100<H>  4.6   |  23  |  1.8<H>    Ca    9.3      18 Jun 2019 06:07  Mg     2.1     06-18    TPro  6.1  /  Alb  3.8  /  TBili  0.3  /  DBili  <0.2  /  AST  10  /  ALT  11  /  AlkPhos  74  06-18    LIVER FUNCTIONS - ( 18 Jun 2019 06:07 )  Alb: 3.8 g/dL / Pro: 6.1 g/dL / ALK PHOS: 74 U/L / ALT: 11 U/L / AST: 10 U/L / GGT: x           PT/INR - ( 18 Jun 2019 06:07 )   PT: 10.60 sec;   INR: 0.92 ratio         PTT - ( 18 Jun 2019 06:07 )  PTT:23.8 sec                  RADIOLOGY & ADDITIONAL TESTS:    Imaging Personally Reviewed:       Advance Directives:      Palliative Care:

## 2019-06-18 NOTE — CHART NOTE - NSCHARTNOTEFT_GEN_A_CORE
Called by RN due to patient wanting to pursue proper medication reconciliation. Medications provided on a print out and copy placed in chart. Family described concern regarding patient's mood and difficulty sleeping. Pt previously on mirtazapine. Will restart mirtazapine and monitor for effects. Family describes longterm use of medication.

## 2019-06-18 NOTE — PROGRESS NOTE ADULT - ASSESSMENT
86 year old male with PMH of BPH, CAD s/p CABG, DLD, GERD presents here for dizziness and Loss of consciousness for 10 seconds.    Syncope with positive orthostatics  - 2D echo shows EF of 50-55 %, mild MR and grade 1 DD  - EEG shows Abnormal due to the presence of: generalized slowing  - troponin negative    CAD s/p CABG   - If Hb remain stable will start Aspirin 81 mg oral daily on discharge, on hold for now  - continue with metoprolol and lisinopril    Acute on chronic anemia  - PPI twice daily  - EGD showed esophagitis and non-bleeding ulcer in antrum.  Biopsies taken neg  - Dr. Slaughter advised to continue to monitor patient   - Hb 7.8 s/p 1 unit, repeat 9.8. colonoscopy cancelled tomorrow because family refused    Dyslipidemia  -continue with simvastatin    Benign Prostatic Hyperplasia  -on rapaflo at home, Flomax here    Hypertension  - stable  - Continue carvedilol and holding lisinopril as per cardiologist    Ambulate with assistance  DVT prophylaxis with SCD  full code  comes from home

## 2019-06-19 LAB
ANION GAP SERPL CALC-SCNC: 12 MMOL/L — SIGNIFICANT CHANGE UP (ref 7–14)
BASOPHILS # BLD AUTO: 0.02 K/UL — SIGNIFICANT CHANGE UP (ref 0–0.2)
BASOPHILS NFR BLD AUTO: 0.3 % — SIGNIFICANT CHANGE UP (ref 0–1)
BUN SERPL-MCNC: 29 MG/DL — HIGH (ref 10–20)
CALCIUM SERPL-MCNC: 9 MG/DL — SIGNIFICANT CHANGE UP (ref 8.5–10.1)
CHLORIDE SERPL-SCNC: 109 MMOL/L — SIGNIFICANT CHANGE UP (ref 98–110)
CO2 SERPL-SCNC: 22 MMOL/L — SIGNIFICANT CHANGE UP (ref 17–32)
CREAT SERPL-MCNC: 1.8 MG/DL — HIGH (ref 0.7–1.5)
EOSINOPHIL # BLD AUTO: 0.26 K/UL — SIGNIFICANT CHANGE UP (ref 0–0.7)
EOSINOPHIL NFR BLD AUTO: 4 % — SIGNIFICANT CHANGE UP (ref 0–8)
GLUCOSE SERPL-MCNC: 91 MG/DL — SIGNIFICANT CHANGE UP (ref 70–99)
HCT VFR BLD CALC: 29.1 % — LOW (ref 42–52)
HCT VFR BLD CALC: 31.8 % — LOW (ref 42–52)
HGB BLD-MCNC: 10.1 G/DL — LOW (ref 14–18)
HGB BLD-MCNC: 9.4 G/DL — LOW (ref 14–18)
IMM GRANULOCYTES NFR BLD AUTO: 0.8 % — HIGH (ref 0.1–0.3)
LYMPHOCYTES # BLD AUTO: 1.66 K/UL — SIGNIFICANT CHANGE UP (ref 1.2–3.4)
LYMPHOCYTES # BLD AUTO: 25.3 % — SIGNIFICANT CHANGE UP (ref 20.5–51.1)
MCHC RBC-ENTMCNC: 31.2 PG — HIGH (ref 27–31)
MCHC RBC-ENTMCNC: 31.3 PG — HIGH (ref 27–31)
MCHC RBC-ENTMCNC: 31.8 G/DL — LOW (ref 32–37)
MCHC RBC-ENTMCNC: 32.3 G/DL — SIGNIFICANT CHANGE UP (ref 32–37)
MCV RBC AUTO: 97 FL — HIGH (ref 80–94)
MCV RBC AUTO: 98.1 FL — HIGH (ref 80–94)
MONOCYTES # BLD AUTO: 0.58 K/UL — SIGNIFICANT CHANGE UP (ref 0.1–0.6)
MONOCYTES NFR BLD AUTO: 8.9 % — SIGNIFICANT CHANGE UP (ref 1.7–9.3)
NEUTROPHILS # BLD AUTO: 3.98 K/UL — SIGNIFICANT CHANGE UP (ref 1.4–6.5)
NEUTROPHILS NFR BLD AUTO: 60.7 % — SIGNIFICANT CHANGE UP (ref 42.2–75.2)
NRBC # BLD: 0 /100 WBCS — SIGNIFICANT CHANGE UP (ref 0–0)
NRBC # BLD: 0 /100 WBCS — SIGNIFICANT CHANGE UP (ref 0–0)
PLATELET # BLD AUTO: 196 K/UL — SIGNIFICANT CHANGE UP (ref 130–400)
PLATELET # BLD AUTO: 203 K/UL — SIGNIFICANT CHANGE UP (ref 130–400)
POTASSIUM SERPL-MCNC: 4.5 MMOL/L — SIGNIFICANT CHANGE UP (ref 3.5–5)
POTASSIUM SERPL-SCNC: 4.5 MMOL/L — SIGNIFICANT CHANGE UP (ref 3.5–5)
RBC # BLD: 3 M/UL — LOW (ref 4.7–6.1)
RBC # BLD: 3.24 M/UL — LOW (ref 4.7–6.1)
RBC # FLD: 15.5 % — HIGH (ref 11.5–14.5)
RBC # FLD: 15.6 % — HIGH (ref 11.5–14.5)
SODIUM SERPL-SCNC: 143 MMOL/L — SIGNIFICANT CHANGE UP (ref 135–146)
WBC # BLD: 6.55 K/UL — SIGNIFICANT CHANGE UP (ref 4.8–10.8)
WBC # BLD: 6.91 K/UL — SIGNIFICANT CHANGE UP (ref 4.8–10.8)
WBC # FLD AUTO: 6.55 K/UL — SIGNIFICANT CHANGE UP (ref 4.8–10.8)
WBC # FLD AUTO: 6.91 K/UL — SIGNIFICANT CHANGE UP (ref 4.8–10.8)

## 2019-06-19 RX ORDER — SILODOSIN 4 MG/1
1 CAPSULE ORAL
Qty: 30 | Refills: 0
Start: 2019-06-19 | End: 2019-07-18

## 2019-06-19 RX ORDER — ASPIRIN/CALCIUM CARB/MAGNESIUM 324 MG
1 TABLET ORAL
Qty: 0 | Refills: 0 | DISCHARGE

## 2019-06-19 RX ORDER — EZETIMIBE AND SIMVASTATIN 10; 80 MG/1; MG/1
1 TABLET, FILM COATED ORAL
Qty: 0 | Refills: 0 | DISCHARGE

## 2019-06-19 RX ORDER — CLOPIDOGREL BISULFATE 75 MG/1
1 TABLET, FILM COATED ORAL
Qty: 0 | Refills: 0 | DISCHARGE

## 2019-06-19 RX ORDER — SIMVASTATIN 20 MG/1
1 TABLET, FILM COATED ORAL
Qty: 30 | Refills: 0
Start: 2019-06-19 | End: 2019-07-18

## 2019-06-19 RX ORDER — PANTOPRAZOLE SODIUM 20 MG/1
40 TABLET, DELAYED RELEASE ORAL EVERY 12 HOURS
Refills: 0 | Status: DISCONTINUED | OUTPATIENT
Start: 2019-06-19 | End: 2019-06-20

## 2019-06-19 RX ORDER — LISINOPRIL 2.5 MG/1
5 TABLET ORAL DAILY
Refills: 0 | Status: DISCONTINUED | OUTPATIENT
Start: 2019-06-19 | End: 2019-06-20

## 2019-06-19 RX ORDER — LISINOPRIL 2.5 MG/1
1 TABLET ORAL
Qty: 0 | Refills: 0 | DISCHARGE

## 2019-06-19 RX ORDER — ASPIRIN/CALCIUM CARB/MAGNESIUM 324 MG
81 TABLET ORAL DAILY
Refills: 0 | Status: DISCONTINUED | OUTPATIENT
Start: 2019-06-19 | End: 2019-06-20

## 2019-06-19 RX ORDER — SILODOSIN 4 MG/1
1 CAPSULE ORAL
Qty: 0 | Refills: 0 | DISCHARGE

## 2019-06-19 RX ADMIN — CARVEDILOL PHOSPHATE 12.5 MILLIGRAM(S): 80 CAPSULE, EXTENDED RELEASE ORAL at 05:40

## 2019-06-19 RX ADMIN — LISINOPRIL 10 MILLIGRAM(S): 2.5 TABLET ORAL at 05:40

## 2019-06-19 RX ADMIN — PANTOPRAZOLE SODIUM 40 MILLIGRAM(S): 20 TABLET, DELAYED RELEASE ORAL at 17:39

## 2019-06-19 RX ADMIN — Medication 81 MILLIGRAM(S): at 11:50

## 2019-06-19 RX ADMIN — LISINOPRIL 5 MILLIGRAM(S): 2.5 TABLET ORAL at 21:44

## 2019-06-19 RX ADMIN — SIMVASTATIN 40 MILLIGRAM(S): 20 TABLET, FILM COATED ORAL at 21:43

## 2019-06-19 RX ADMIN — PANTOPRAZOLE SODIUM 40 MILLIGRAM(S): 20 TABLET, DELAYED RELEASE ORAL at 05:40

## 2019-06-19 NOTE — PROGRESS NOTE ADULT - ASSESSMENT
86 year old male with PMH of BPH, CAD s/p CABG, DLD, GERD presents here for dizziness and Loss of consciousness for 10 seconds.    Syncope with positive orthostatics  - 2D echo shows EF of 50-55 %, mild MR and grade 1 DD  - EEG shows Abnormal due to the presence of: generalized slowing but negative for seizures.  - troponin negative    Acute on chronic anemia with melena  - c/w PPI twice daily  - s/p EGD showed esophagitis and non-bleeding ulcer in antrum.  Biopsies taken neg  - Hb 9.4 stable today, will monitor q 12. pt has active type and screen.  - colonoscopy cancelled because family refused    CAD s/p CABG   - started Aspirin 81 mg oral daily today  - continue with metoprolol and lisinopril    CKD 3  continue to monitor  avoid nephrotoxic medications.    Dyslipidemia  -continue with simvastatin    Benign Prostatic Hyperplasia  -on rapaflo at home, Flomax here    Hypertension  - stable  - Continue carvedilol and lisinopril     Ambulate with assistance  DVT prophylaxis with SCD  full code  comes from home

## 2019-06-19 NOTE — DIETITIAN INITIAL EVALUATION ADULT. - DIET TYPE
dysphagia 3, soft, thin liquids/pt eating well around 90% of all meals. No oral issue./DASH/TLC (sodium and cholesterol restricted diet)

## 2019-06-19 NOTE — PROGRESS NOTE ADULT - SUBJECTIVE AND OBJECTIVE BOX
CALLI VALENTINO 86y Male  MRN#: [id number]   CODE STATUS: FULL      SUBJECTIVE  Patient is a 86y old Male who presents with a chief complaint of weakness (19 Jun 2019 12:02)  Currently admitted to medicine with the primary diagnosis of Syncope  Hospital course has been complicated by undergoing EGD for melena which showed esophagitis and non bleeding gastric antral ulcer.  Today is hospital day 9d, and this morning he is not having any active complaints and he denied any hx of melena.      OBJECTIVE  PAST MEDICAL & SURGICAL HISTORY  BPH (benign prostatic hyperplasia)  HLD (hyperlipidemia)  HTN (hypertension)  CAD (coronary artery disease)  History of total knee replacement  H/O hernia repair  S/P CABG (coronary artery bypass graft)    ALLERGIES:  No Known Allergies    MEDICATIONS:  STANDING MEDICATIONS  aspirin  chewable 81 milliGRAM(s) Oral daily  carvedilol 12.5 milliGRAM(s) Oral every 12 hours  chlorhexidine 4% Liquid 1 Application(s) Topical <User Schedule>  lisinopril 5 milliGRAM(s) Oral daily  pantoprazole    Tablet 40 milliGRAM(s) Oral every 12 hours  simvastatin 40 milliGRAM(s) Oral at bedtime    PRN MEDICATIONS      VITAL SIGNS: Last 24 Hours  T(C): 35.8 (19 Jun 2019 13:29), Max: 36.5 (18 Jun 2019 21:47)  T(F): 96.4 (19 Jun 2019 13:29), Max: 97.7 (18 Jun 2019 21:47)  HR: 51 (19 Jun 2019 13:29) (51 - 62)  BP: 120/63 (19 Jun 2019 13:29) (109/55 - 130/86)  BP(mean): --  RR: 18 (19 Jun 2019 13:29) (17 - 18)  SpO2: 98% (18 Jun 2019 19:50) (98% - 98%)    LABS:                        9.4    6.91  )-----------( 196      ( 19 Jun 2019 05:33 )             29.1     06-19    143  |  109  |  29<H>  ----------------------------<  91  4.5   |  22  |  1.8<H>    Ca    9.0      19 Jun 2019 05:33  Mg     2.1     06-18    TPro  6.1  /  Alb  3.8  /  TBili  0.3  /  DBili  <0.2  /  AST  10  /  ALT  11  /  AlkPhos  74  06-18    PT/INR - ( 18 Jun 2019 06:07 )   PT: 10.60 sec;   INR: 0.92 ratio         PTT - ( 18 Jun 2019 06:07 )  PTT:23.8 sec              RADIOLOGY:  < from: CT Head No Cont (06.10.19 @ 00:24) >  IMPRESSION:       No evidence of intracranial hemorrhage, territorial infarct, or mass   effect.    < end of copied text >  < from: CT Chest No Cont (06.08.19 @ 16:14) >  IMPRESSION:  1. No new, suspicious or enlarging pulmonary nodule.  2. Stable benign 0.7 cm pleural-based nodule in the right upper lobe,   unchanged since December 2015.  3. Evidence of asbestos related pleural disease.             < end of copied text >    < from: CT Abdomen and Pelvis No Cont (12.28.18 @ 16:54) >    IMPRESSION:   1. Acute fractures of the left transverse processes of L2, L3, and L4.  Likely subacute fracture of the left ninth posterior rib.  2. Otherwise, no acute traumatic injury to the chest, abdomen, or pelvis.  3. 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 CT follow-up isrecommended in 6-12 months.  4. Cholelithiasis.    < end of copied text >  PHYSICAL EXAM:    GENERAL: NAD, well-developed, AAOx3  HEENT:  Atraumatic, Normocephalic. EOMI, PERRLA, conjunctiva and sclera clear, No JVD. scalp lesion  PULMONARY: Clear to auscultation bilaterally; No wheeze  CARDIOVASCULAR: Regular rate and rhythm; No murmurs, rubs, or gallops  GASTROINTESTINAL: Soft, Nontender, Nondistended; Bowel sounds present  MUSCULOSKELETAL:  2+ Peripheral Pulses, No clubbing, cyanosis, or edema  NEUROLOGY: non-focal  SKIN: No rashes or lesions

## 2019-06-19 NOTE — PROGRESS NOTE ADULT - SUBJECTIVE AND OBJECTIVE BOX
INTERVAL HPI/OVERNIGHT EVENTS:  Patient S&E at bedside. No o/n events,   Patient comfortable in the recliner.  Awaiting rehab placement.  HGB stable at 9.4  No other complaints.    VITAL SIGNS:  T(F): 96.8 (06-19-19 @ 05:58)  HR: 52 (06-19-19 @ 11:46)  BP: 113/55 (06-19-19 @ 11:46)  RR: 17 (06-19-19 @ 05:58)  SpO2: 98% (06-18-19 @ 19:50)  Wt(kg): --    PHYSICAL EXAM:    Constitutional: NAD  Eyes: EOMI, sclera non-icteric  Neck: supple, no masses, no JVD  Respiratory: CTA b/l, good air entry b/l  Cardiovascular: RRR, no M/R/G  Gastrointestinal: soft, NTND, no masses palpable, + BS, no hepatosplenomegaly  Extremities: no c/c/e  Neurological: AAOx3      MEDICATIONS  (STANDING):  aspirin  chewable 81 milliGRAM(s) Oral daily  carvedilol 12.5 milliGRAM(s) Oral every 12 hours  chlorhexidine 4% Liquid 1 Application(s) Topical <User Schedule>  lisinopril 5 milliGRAM(s) Oral daily  pantoprazole    Tablet 40 milliGRAM(s) Oral every 12 hours  simvastatin 40 milliGRAM(s) Oral at bedtime    MEDICATIONS  (PRN):      Allergies    No Known Allergies    Intolerances        LABS:                        9.4    6.91  )-----------( 196      ( 19 Jun 2019 05:33 )             29.1     06-19    143  |  109  |  29<H>  ----------------------------<  91  4.5   |  22  |  1.8<H>    Ca    9.0      19 Jun 2019 05:33  Mg     2.1     06-18    TPro  6.1  /  Alb  3.8  /  TBili  0.3  /  DBili  <0.2  /  AST  10  /  ALT  11  /  AlkPhos  74  06-18    PT/INR - ( 18 Jun 2019 06:07 )   PT: 10.60 sec;   INR: 0.92 ratio         PTT - ( 18 Jun 2019 06:07 )  PTT:23.8 sec      RADIOLOGY & ADDITIONAL TESTS:  Studies reviewed.

## 2019-06-19 NOTE — DIETITIAN INITIAL EVALUATION ADULT. - MD RECOMMEND
Consider adding a BOWEL REGIMEN as pt is c/o constipation but he is unsure if he should try bowel regimen or not. Otherwise, c/w current diet./other

## 2019-06-19 NOTE — DIETITIAN INITIAL EVALUATION ADULT. - PHYSICAL APPEARANCE
BMI is 20.5 (UBW of around 160# per pt report, stable, however, EMR showed weight of 138.6#. Possible weight scale error./well nourished

## 2019-06-19 NOTE — DIETITIAN INITIAL EVALUATION ADULT. - OTHER INFO
P/w dizziness + loss of consciousness for 10 seconds. 2D echo shows EF 50-55%. [place] shows abnormality. hb monitoring anemia. EGD by GI showed esophagitis + non bleeding ulcer in antrum. On home meds.

## 2019-06-19 NOTE — PROGRESS NOTE ADULT - SUBJECTIVE AND OBJECTIVE BOX
CALLI VALENTINO  86y  Male      SUBJECTIVE:  no bm; no dizziness    Progress Note:      REVIEW OF SYSTEMS:    T(C): 36 (06-19-19 @ 05:58), Max: 36.5 (06-18-19 @ 21:47)  HR: 56 (06-19-19 @ 05:58) (56 - 63)  BP: 109/55 (06-19-19 @ 05:58) (109/55 - 130/86)  RR: 17 (06-19-19 @ 05:58) (17 - 18)  SpO2: 98% (06-18-19 @ 19:50) (98% - 98%)  Wt(kg): --Vital Signs Last 24 Hrs  T(C): 36 (19 Jun 2019 05:58), Max: 36.5 (18 Jun 2019 21:47)  T(F): 96.8 (19 Jun 2019 05:58), Max: 97.7 (18 Jun 2019 21:47)  HR: 56 (19 Jun 2019 05:58) (56 - 63)  BP: 109/55 (19 Jun 2019 05:58) (109/55 - 130/86)  BP(mean): --  RR: 17 (19 Jun 2019 05:58) (17 - 18)  SpO2: 98% (18 Jun 2019 19:50) (98% - 98%)    PHYSICAL EXAM:  LUNGS-CLEAR  COR-REG,NL S1 &S2  ABD-SOFT,NON TENDER  LABS:                        9.4    6.91  )-----------( 196      ( 19 Jun 2019 05:33 )             29.1     06-19    143  |  109  |  29<H>  ----------------------------<  91  4.5   |  22  |  1.8<H>    Ca    9.0      19 Jun 2019 05:33  Mg     2.1     06-18    TPro  6.1  /  Alb  3.8  /  TBili  0.3  /  DBili  <0.2  /  AST  10  /  ALT  11  /  AlkPhos  74  06-18    RADIOLOGY:      IMPRESSION:  SYNCOPE-VASOVAGAL  GASTRIC ULCER  UGI BLEED-CLINICALLY STOPPED  ANEMIA-HGB  STABLE  CKD-STAGE 3  CAD/CABG  RIGHT TKR  BPH  S/P MELANOMA LEFT NOSTRIL  ASBESTOSIS  HYPERLIPIDEMIA        PLAN:    MONITOR CBC  D/C TO SNF WHEN BED AVAILABLE FOR REHAB  PATIENT DOES NOT WANT COLONOSCOPY    I SPENT  30 MINUTES EXAMINING AND COUNSELING PATIENT AND COORDINATING CARE WITH RESIDENT

## 2019-06-19 NOTE — PROGRESS NOTE ADULT - ASSESSMENT
Anemia secondary to GI bleeding and cKD3 improved.  Coronary artery disease.  CKD 3      Awaiting placement, rehab.  I will see him in my office on July 11, 2019 at 11 am at [address]

## 2019-06-20 ENCOUNTER — TRANSCRIPTION ENCOUNTER (OUTPATIENT)
Age: 84
End: 2019-06-20

## 2019-06-20 VITALS — DIASTOLIC BLOOD PRESSURE: 52 MMHG | HEART RATE: 55 BPM | SYSTOLIC BLOOD PRESSURE: 100 MMHG

## 2019-06-20 LAB
ALBUMIN SERPL ELPH-MCNC: 3.8 G/DL — SIGNIFICANT CHANGE UP (ref 3.5–5.2)
ALP SERPL-CCNC: 77 U/L — SIGNIFICANT CHANGE UP (ref 30–115)
ALT FLD-CCNC: 9 U/L — SIGNIFICANT CHANGE UP (ref 0–41)
ANION GAP SERPL CALC-SCNC: 11 MMOL/L — SIGNIFICANT CHANGE UP (ref 7–14)
AST SERPL-CCNC: 10 U/L — SIGNIFICANT CHANGE UP (ref 0–41)
BASOPHILS # BLD AUTO: 0.02 K/UL — SIGNIFICANT CHANGE UP (ref 0–0.2)
BASOPHILS NFR BLD AUTO: 0.3 % — SIGNIFICANT CHANGE UP (ref 0–1)
BILIRUB SERPL-MCNC: 0.4 MG/DL — SIGNIFICANT CHANGE UP (ref 0.2–1.2)
BUN SERPL-MCNC: 30 MG/DL — HIGH (ref 10–20)
CALCIUM SERPL-MCNC: 9.5 MG/DL — SIGNIFICANT CHANGE UP (ref 8.5–10.1)
CHLORIDE SERPL-SCNC: 108 MMOL/L — SIGNIFICANT CHANGE UP (ref 98–110)
CO2 SERPL-SCNC: 23 MMOL/L — SIGNIFICANT CHANGE UP (ref 17–32)
CREAT SERPL-MCNC: 1.8 MG/DL — HIGH (ref 0.7–1.5)
EOSINOPHIL # BLD AUTO: 0.23 K/UL — SIGNIFICANT CHANGE UP (ref 0–0.7)
EOSINOPHIL NFR BLD AUTO: 3 % — SIGNIFICANT CHANGE UP (ref 0–8)
GLUCOSE SERPL-MCNC: 89 MG/DL — SIGNIFICANT CHANGE UP (ref 70–99)
HCT VFR BLD CALC: 30.4 % — LOW (ref 42–52)
HGB BLD-MCNC: 9.6 G/DL — LOW (ref 14–18)
IMM GRANULOCYTES NFR BLD AUTO: 0.6 % — HIGH (ref 0.1–0.3)
LYMPHOCYTES # BLD AUTO: 1.67 K/UL — SIGNIFICANT CHANGE UP (ref 1.2–3.4)
LYMPHOCYTES # BLD AUTO: 21.4 % — SIGNIFICANT CHANGE UP (ref 20.5–51.1)
MAGNESIUM SERPL-MCNC: 2.1 MG/DL — SIGNIFICANT CHANGE UP (ref 1.8–2.4)
MCHC RBC-ENTMCNC: 30.9 PG — SIGNIFICANT CHANGE UP (ref 27–31)
MCHC RBC-ENTMCNC: 31.6 G/DL — LOW (ref 32–37)
MCV RBC AUTO: 97.7 FL — HIGH (ref 80–94)
MONOCYTES # BLD AUTO: 0.84 K/UL — HIGH (ref 0.1–0.6)
MONOCYTES NFR BLD AUTO: 10.8 % — HIGH (ref 1.7–9.3)
NEUTROPHILS # BLD AUTO: 4.98 K/UL — SIGNIFICANT CHANGE UP (ref 1.4–6.5)
NEUTROPHILS NFR BLD AUTO: 63.9 % — SIGNIFICANT CHANGE UP (ref 42.2–75.2)
NRBC # BLD: 0 /100 WBCS — SIGNIFICANT CHANGE UP (ref 0–0)
PLATELET # BLD AUTO: 199 K/UL — SIGNIFICANT CHANGE UP (ref 130–400)
POTASSIUM SERPL-MCNC: 4.7 MMOL/L — SIGNIFICANT CHANGE UP (ref 3.5–5)
POTASSIUM SERPL-SCNC: 4.7 MMOL/L — SIGNIFICANT CHANGE UP (ref 3.5–5)
PROT SERPL-MCNC: 6.2 G/DL — SIGNIFICANT CHANGE UP (ref 6–8)
RBC # BLD: 3.11 M/UL — LOW (ref 4.7–6.1)
RBC # FLD: 15.5 % — HIGH (ref 11.5–14.5)
SODIUM SERPL-SCNC: 142 MMOL/L — SIGNIFICANT CHANGE UP (ref 135–146)
WBC # BLD: 7.79 K/UL — SIGNIFICANT CHANGE UP (ref 4.8–10.8)
WBC # FLD AUTO: 7.79 K/UL — SIGNIFICANT CHANGE UP (ref 4.8–10.8)

## 2019-06-20 RX ORDER — RANITIDINE HYDROCHLORIDE 150 MG/1
1 TABLET, FILM COATED ORAL
Qty: 0 | Refills: 0 | DISCHARGE

## 2019-06-20 RX ORDER — TAMSULOSIN HYDROCHLORIDE 0.4 MG/1
1 CAPSULE ORAL
Qty: 0 | Refills: 0 | DISCHARGE

## 2019-06-20 RX ORDER — PANTOPRAZOLE SODIUM 20 MG/1
1 TABLET, DELAYED RELEASE ORAL
Qty: 0 | Refills: 0 | DISCHARGE
Start: 2019-06-20

## 2019-06-20 RX ORDER — ASPIRIN/CALCIUM CARB/MAGNESIUM 324 MG
1 TABLET ORAL
Qty: 0 | Refills: 0 | DISCHARGE
Start: 2019-06-20

## 2019-06-20 RX ADMIN — Medication 81 MILLIGRAM(S): at 11:26

## 2019-06-20 RX ADMIN — LISINOPRIL 5 MILLIGRAM(S): 2.5 TABLET ORAL at 05:39

## 2019-06-20 RX ADMIN — CHLORHEXIDINE GLUCONATE 1 APPLICATION(S): 213 SOLUTION TOPICAL at 05:40

## 2019-06-20 RX ADMIN — PANTOPRAZOLE SODIUM 40 MILLIGRAM(S): 20 TABLET, DELAYED RELEASE ORAL at 05:39

## 2019-06-20 RX ADMIN — CARVEDILOL PHOSPHATE 12.5 MILLIGRAM(S): 80 CAPSULE, EXTENDED RELEASE ORAL at 05:39

## 2019-06-20 NOTE — DISCHARGE NOTE PROVIDER - HOSPITAL COURSE
86 year old male with PMH of BPH, CAD s/p CABG, DLD, GERD presented to the hospital for dizziness and Loss of consciousness which lasted for 10 seconds.    Pt was admitted for syncope, he was positive for orthostatic vitals most likely from new onset anemia from melena. His 2D echo shows EF of 50-55 %, mild MR and grade 1 DD, EEG shows Abnormal due to the presence of: generalized slowing but negative for seizures, troponins negative.        Hospital course was complicated with Acute on chronic anemia from melena, he required 1 unit PRBC transfusion, he was seen by GI and he underwent EGD which showed esophagitis and gastric antral ulcer which was not actively bleeding so no intervention was done. Both the patient and his family refused to undergo colonoscopy. His Aspirin and plavex were held, his cardiologist was ok with that. Aspirin was resumed after Hemoglobin stabelized. His PPI was switched to BID.        His orhtostaic hypotension improved after OREN stockings. He will go to [facility].

## 2019-06-20 NOTE — DISCHARGE NOTE PROVIDER - CARE PROVIDER_API CALL
Tristen Slaughter)  Gastroenterology; Internal Medicine  [address] [place] [address]  Phone: [phone]  Fax: [phone]  Follow Up Time:     Aroldo Pitts)  Geriatric Medicine; Internal Medicine  [address] [place] [address]  Phone: [phone]  Fax: [phone]  Follow Up Time:

## 2019-06-20 NOTE — CHART NOTE - NSCHARTNOTEFT_GEN_A_CORE
<<<RESIDENT DISCHARGE NOTE>>>     CALLI VALENTINO  MRN-[id number]    VITAL SIGNS:  T(F): 95.7 (06-20-19 @ 14:02), Max: 96.6 (06-19-19 @ 21:58)  HR: 55 (06-20-19 @ 14:27)  BP: 100/52 (06-20-19 @ 14:27)  SpO2: --      PHYSICAL EXAMINATION:  General: NAD   Head & Neck: Atraumatic, Normocephalic, No JVD  Pulmonary: Normal breath sounds, no wheezing  Cardiovascular: normal S1/S, no M/G/R   Gastrointestinal/Abdomen & Pelvis: Normal bowel sounds, no pain on palpation.   Neurologic/Motor:  AAOx3.     TEST RESULTS:                        9.6    7.79  )-----------( 199      ( 20 Jun 2019 05:57 )             30.4       06-20    142  |  108  |  30<H>  ----------------------------<  89  4.7   |  23  |  1.8<H>    Ca    9.5      20 Jun 2019 05:57  Mg     2.1     06-20    TPro  6.2  /  Alb  3.8  /  TBili  0.4  /  DBili  x   /  AST  10  /  ALT  9   /  AlkPhos  77  06-20      FINAL DISCHARGE INTERVIEW:  Resident(s) Present: (Name:______Willow Spear MD_______), RN Present: (Name:  ___________)    DISCHARGE MEDICATION RECONCILIATION  reviewed with Attending (Name:___________)    DISPOSITION:   [  ] Home,    [  ] Home with Visiting Nursing Services,   [ X   ]  SNF/ NH,    [   ] Acute Rehab (4A),   [   ] Other (Specify:_________) <<<RESIDENT DISCHARGE NOTE>>>     CALLI VALENTINO  MRN-[id number]    VITAL SIGNS:  T(F): 95.7 (06-20-19 @ 14:02), Max: 96.6 (06-19-19 @ 21:58)  HR: 55 (06-20-19 @ 14:27)  BP: 100/52 (06-20-19 @ 14:27)  SpO2: --      PHYSICAL EXAMINATION:  General: NAD   Head & Neck: Atraumatic, Normocephalic, No JVD  Pulmonary: Normal breath sounds, no wheezing  Cardiovascular: normal S1/S, no M/G/R   Gastrointestinal/Abdomen & Pelvis: Normal bowel sounds, no pain on palpation.   Neurologic/Motor:  AAOx3.     TEST RESULTS:                        9.6    7.79  )-----------( 199      ( 20 Jun 2019 05:57 )             30.4       06-20    142  |  108  |  30<H>  ----------------------------<  89  4.7   |  23  |  1.8<H>    Ca    9.5      20 Jun 2019 05:57  Mg     2.1     06-20    TPro  6.2  /  Alb  3.8  /  TBili  0.4  /  DBili  x   /  AST  10  /  ALT  9   /  AlkPhos  77  06-20      FINAL DISCHARGE INTERVIEW:  Resident(s) Present: (Name:______Willow Spear MD_______), RN Present: (Name:  ___________)    DISCHARGE MEDICATION RECONCILIATION  reviewed with Attending (Name:_______Dr. Mcgee____)    DISPOSITION:   [  ] Home,    [  ] Home with Visiting Nursing Services,   [ X   ]  SNF/ NH,    [   ] Acute Rehab (4A),   [   ] Other (Specify:_________)

## 2019-06-20 NOTE — PROGRESS NOTE ADULT - REASON FOR ADMISSION
weakness

## 2019-06-20 NOTE — DISCHARGE NOTE PROVIDER - NSDCCPCAREPLAN_GEN_ALL_CORE_FT
PRINCIPAL DISCHARGE DIAGNOSIS  Diagnosis: Syncope  Assessment and Plan of Treatment: you were admitted for syncope from orthostatic hypotension most likely from gasterointestinal bleeding. Continue taking medications as advised and follow up with your primary medical provider and gasteroenterologist.      SECONDARY DISCHARGE DIAGNOSES  Diagnosis: Orthostatic hypotension  Assessment and Plan of Treatment: keep your self hydrated and continue to use OREN stockings.    Diagnosis: Gastric ulcer  Assessment and Plan of Treatment: you had dark coloured stool most likely from gasterointestinal bleeding. Your EGD showed esophagitis and gastric ulcer which was not actively bleeding. You refused to undergo colonoscopy for futher workup. Continue taking medications and follow up with your gasteroenterologist outpatient on when to resume plavix.

## 2019-06-20 NOTE — PROGRESS NOTE ADULT - PROVIDER SPECIALTY LIST ADULT
Gastroenterology
Gastroenterology
Heme/Onc
Internal Medicine
Gastroenterology

## 2019-06-20 NOTE — PROGRESS NOTE ADULT - SUBJECTIVE AND OBJECTIVE BOX
06-20-19 @ 13:02    JAECALLI OBREGON  86y  Male      INTERVAL EVENTS:    MEDICATIONS  (STANDING):  aspirin  chewable 81 milliGRAM(s) Oral daily  carvedilol 12.5 milliGRAM(s) Oral every 12 hours  chlorhexidine 4% Liquid 1 Application(s) Topical <User Schedule>  lisinopril 5 milliGRAM(s) Oral daily  pantoprazole    Tablet 40 milliGRAM(s) Oral every 12 hours  simvastatin 40 milliGRAM(s) Oral at bedtime    MEDICATIONS  (PRN):      T(C): 35.8 (06-20-19 @ 06:23), Max: 35.9 (06-19-19 @ 21:58)  HR: 59 (06-20-19 @ 06:23) (51 - 59)  BP: 153/71 (06-20-19 @ 06:23) (120/63 - 153/71)  RR: 19 (06-20-19 @ 06:23) (18 - 19)  SpO2: --  Wt(kg): --Vital Signs Last 24 Hrs  T(C): 35.8 (20 Jun 2019 06:23), Max: 35.9 (19 Jun 2019 21:58)  T(F): 96.4 (20 Jun 2019 06:23), Max: 96.6 (19 Jun 2019 21:58)  HR: 59 (20 Jun 2019 06:23) (51 - 59)  BP: 153/71 (20 Jun 2019 06:23) (120/63 - 153/71)  BP(mean): --  RR: 19 (20 Jun 2019 06:23) (18 - 19)  SpO2: --    PHYSICAL EXAM:  GENERAL:   NECK:   CHEST/LUNG:  HEART: S1  ABDOMEN:   EXTREMITIES:                           9.6    7.79  )-----------( 199      ( 20 Jun 2019 05:57 )             30.4     06-20    142  |  108  |  30<H>  ----------------------------<  89  4.7   |  23  |  1.8<H>    Ca    9.5      20 Jun 2019 05:57  Mg     2.1     06-20    TPro  6.2  /  Alb  3.8  /  TBili  0.4  /  DBili  x   /  AST  10  /  ALT  9   /  AlkPhos  77  06-20            RADIOLOGY & ADDITIONAL TESTS:      ASSESSMENT / PLAN  : 06-20-19 @ 13:02    JAECALLI OBREGON  86y  Male  OOB to chair.   Son Rajeev, wife Batool at bedside.   No focal c/o. Tired look.     INTERVAL EVENTS: None    MEDICATIONS  (STANDING):  aspirin  chewable 81 milliGRAM(s) Oral daily  carvedilol 12.5 milliGRAM(s) Oral every 12 hours  chlorhexidine 4% Liquid 1 Application(s) Topical <User Schedule>  lisinopril 5 milliGRAM(s) Oral daily  pantoprazole    Tablet 40 milliGRAM(s) Oral every 12 hours  simvastatin 40 milliGRAM(s) Oral at bedtime    MEDICATIONS  (PRN):      T(C): 35.8 (06-20-19 @ 06:23), Max: 35.9 (06-19-19 @ 21:58)  HR: 59 (06-20-19 @ 06:23) (51 - 59)  BP: 153/71 (06-20-19 @ 06:23) (120/63 - 153/71)  RR: 19 (06-20-19 @ 06:23) (18 - 19)  SpO2: --  Wt(kg): --Vital Signs Last 24 Hrs  T(C): 35.8 (20 Jun 2019 06:23), Max: 35.9 (19 Jun 2019 21:58)  T(F): 96.4 (20 Jun 2019 06:23), Max: 96.6 (19 Jun 2019 21:58)  HR: 59 (20 Jun 2019 06:23) (51 - 59)  BP: 153/71 (20 Jun 2019 06:23) (120/63 - 153/71)  BP(mean): --  RR: 19 (20 Jun 2019 06:23) (18 - 19)  SpO2: --    PHYSICAL EXAM:  GENERAL: Tired look.   No pallor. skin warm  NECK: No JVD, supple.   CHEST/LUNG: BL AE, no adv sounds.   HEART: S1S2, reg.   ABDOMEN: soft BS +, NT, no organomegaly  EXTREMITIES: no CCE                          9.6    7.79  )-----------( 199      ( 20 Jun 2019 05:57 )             30.4     06-20    142  |  108  |  30<H>  ----------------------------<  89  4.7   |  23  |  1.8<H>    Ca    9.5      20 Jun 2019 05:57  Mg     2.1     06-20    TPro  6.2  /  Alb  3.8  /  TBili  0.4  /  DBili  x   /  AST  10  /  ALT  9   /  AlkPhos  77  06-20            RADIOLOGY & ADDITIONAL TESTS:      ASSESSMENT / PLAN  :      ANEMIA : 2' UGI bleed. No further melena, but didn't have much BM either. Was transfused 1 unit 3 d ago.  To cont to hold ASA, plavix. [place] diet. Had f/U GI Eval. No CF at present.   UGI Bleed : +ve G/U non bleeding. esophgitis in EGD. PPI to cont.    PUD : as above.   WEAKNESS : Generalised. 2' anemia 2' UGI bleed as well as likely deconditioning. Getting Rehab. F/U H/H stable. Awaiting discharge to nursing home with rehab, physical Therapy   S/p Recent FAINTING : c/w vasovagal post GI bleed. CT head -ve/ Being stable. Will cont to observe.   Rehab for prevention of weakness.

## 2019-06-20 NOTE — DISCHARGE NOTE NURSING/CASE MANAGEMENT/SOCIAL WORK - NSDCDPATPORTLINK_GEN_ALL_CORE
You can access the [facility] Patient Portal, offered by [facility], by registering with the following website: [email or web address]

## 2019-07-01 DIAGNOSIS — R55 SYNCOPE AND COLLAPSE: ICD-10-CM

## 2019-07-01 DIAGNOSIS — R19.5 OTHER FECAL ABNORMALITIES: ICD-10-CM

## 2019-07-01 DIAGNOSIS — G93.89 OTHER SPECIFIED DISORDERS OF BRAIN: ICD-10-CM

## 2019-07-01 DIAGNOSIS — Z85.820 PERSONAL HISTORY OF MALIGNANT MELANOMA OF SKIN: ICD-10-CM

## 2019-07-01 DIAGNOSIS — Z79.02 LONG TERM (CURRENT) USE OF ANTITHROMBOTICS/ANTIPLATELETS: ICD-10-CM

## 2019-07-01 DIAGNOSIS — N18.3 CHRONIC KIDNEY DISEASE, STAGE 3 (MODERATE): ICD-10-CM

## 2019-07-01 DIAGNOSIS — J61 PNEUMOCONIOSIS DUE TO ASBESTOS AND OTHER MINERAL FIBERS: ICD-10-CM

## 2019-07-01 DIAGNOSIS — K21.0 GASTRO-ESOPHAGEAL REFLUX DISEASE WITH ESOPHAGITIS: ICD-10-CM

## 2019-07-01 DIAGNOSIS — Z95.1 PRESENCE OF AORTOCORONARY BYPASS GRAFT: ICD-10-CM

## 2019-07-01 DIAGNOSIS — I34.0 NONRHEUMATIC MITRAL (VALVE) INSUFFICIENCY: ICD-10-CM

## 2019-07-01 DIAGNOSIS — R26.9 UNSPECIFIED ABNORMALITIES OF GAIT AND MOBILITY: ICD-10-CM

## 2019-07-01 DIAGNOSIS — I25.10 ATHEROSCLEROTIC HEART DISEASE OF NATIVE CORONARY ARTERY WITHOUT ANGINA PECTORIS: ICD-10-CM

## 2019-07-01 DIAGNOSIS — N40.0 BENIGN PROSTATIC HYPERPLASIA WITHOUT LOWER URINARY TRACT SYMPTOMS: ICD-10-CM

## 2019-07-01 DIAGNOSIS — D63.1 ANEMIA IN CHRONIC KIDNEY DISEASE: ICD-10-CM

## 2019-07-01 DIAGNOSIS — K25.4 CHRONIC OR UNSPECIFIED GASTRIC ULCER WITH HEMORRHAGE: ICD-10-CM

## 2019-07-01 DIAGNOSIS — I95.1 ORTHOSTATIC HYPOTENSION: ICD-10-CM

## 2019-07-01 DIAGNOSIS — Z79.82 LONG TERM (CURRENT) USE OF ASPIRIN: ICD-10-CM

## 2019-07-01 DIAGNOSIS — K20.9 ESOPHAGITIS, UNSPECIFIED: ICD-10-CM

## 2019-07-01 DIAGNOSIS — E78.5 HYPERLIPIDEMIA, UNSPECIFIED: ICD-10-CM

## 2019-07-01 DIAGNOSIS — E86.0 DEHYDRATION: ICD-10-CM

## 2019-07-01 DIAGNOSIS — D62 ACUTE POSTHEMORRHAGIC ANEMIA: ICD-10-CM

## 2019-08-16 ENCOUNTER — RX RENEWAL (OUTPATIENT)
Age: 84
End: 2019-08-16

## 2019-10-14 ENCOUNTER — INPATIENT (INPATIENT)
Facility: HOSPITAL | Age: 84
LOS: 2 days | Discharge: NURSING, REHAB OR LONG-TERM CARE | End: 2019-10-17
Attending: INTERNAL MEDICINE | Admitting: INTERNAL MEDICINE
Payer: MEDICARE

## 2019-10-14 VITALS
HEART RATE: 76 BPM | DIASTOLIC BLOOD PRESSURE: 62 MMHG | OXYGEN SATURATION: 92 % | SYSTOLIC BLOOD PRESSURE: 152 MMHG | RESPIRATION RATE: 18 BRPM | TEMPERATURE: 101 F

## 2019-10-14 DIAGNOSIS — Z95.1 PRESENCE OF AORTOCORONARY BYPASS GRAFT: Chronic | ICD-10-CM

## 2019-10-14 DIAGNOSIS — Z98.890 OTHER SPECIFIED POSTPROCEDURAL STATES: Chronic | ICD-10-CM

## 2019-10-14 DIAGNOSIS — Z96.659 PRESENCE OF UNSPECIFIED ARTIFICIAL KNEE JOINT: Chronic | ICD-10-CM

## 2019-10-14 LAB
ALBUMIN SERPL ELPH-MCNC: 4.2 G/DL — SIGNIFICANT CHANGE UP (ref 3.5–5.2)
ALP SERPL-CCNC: 67 U/L — SIGNIFICANT CHANGE UP (ref 30–115)
ALT FLD-CCNC: 11 U/L — SIGNIFICANT CHANGE UP (ref 0–41)
ANION GAP SERPL CALC-SCNC: 16 MMOL/L — HIGH (ref 7–14)
AST SERPL-CCNC: 15 U/L — SIGNIFICANT CHANGE UP (ref 0–41)
BASOPHILS # BLD AUTO: 0.01 K/UL — SIGNIFICANT CHANGE UP (ref 0–0.2)
BASOPHILS NFR BLD AUTO: 0.1 % — SIGNIFICANT CHANGE UP (ref 0–1)
BILIRUB SERPL-MCNC: 0.7 MG/DL — SIGNIFICANT CHANGE UP (ref 0.2–1.2)
BUN SERPL-MCNC: 25 MG/DL — HIGH (ref 10–20)
CALCIUM SERPL-MCNC: 9.2 MG/DL — SIGNIFICANT CHANGE UP (ref 8.5–10.1)
CHLORIDE SERPL-SCNC: 104 MMOL/L — SIGNIFICANT CHANGE UP (ref 98–110)
CO2 SERPL-SCNC: 21 MMOL/L — SIGNIFICANT CHANGE UP (ref 17–32)
CREAT SERPL-MCNC: 1.8 MG/DL — HIGH (ref 0.7–1.5)
EOSINOPHIL # BLD AUTO: 0 K/UL — SIGNIFICANT CHANGE UP (ref 0–0.7)
EOSINOPHIL NFR BLD AUTO: 0 % — SIGNIFICANT CHANGE UP (ref 0–8)
FLU A RESULT: NEGATIVE — SIGNIFICANT CHANGE UP
FLU A RESULT: NEGATIVE — SIGNIFICANT CHANGE UP
FLUAV AG NPH QL: NEGATIVE — SIGNIFICANT CHANGE UP
FLUBV AG NPH QL: NEGATIVE — SIGNIFICANT CHANGE UP
GLUCOSE SERPL-MCNC: 123 MG/DL — HIGH (ref 70–99)
HCT VFR BLD CALC: 35.5 % — LOW (ref 42–52)
HGB BLD-MCNC: 11.5 G/DL — LOW (ref 14–18)
IMM GRANULOCYTES NFR BLD AUTO: 0.4 % — HIGH (ref 0.1–0.3)
LACTATE SERPL-SCNC: 1.6 MMOL/L — SIGNIFICANT CHANGE UP (ref 0.5–2.2)
LYMPHOCYTES # BLD AUTO: 0.81 K/UL — LOW (ref 1.2–3.4)
LYMPHOCYTES # BLD AUTO: 8.4 % — LOW (ref 20.5–51.1)
MCHC RBC-ENTMCNC: 29.7 PG — SIGNIFICANT CHANGE UP (ref 27–31)
MCHC RBC-ENTMCNC: 32.4 G/DL — SIGNIFICANT CHANGE UP (ref 32–37)
MCV RBC AUTO: 91.7 FL — SIGNIFICANT CHANGE UP (ref 80–94)
MONOCYTES # BLD AUTO: 0.54 K/UL — SIGNIFICANT CHANGE UP (ref 0.1–0.6)
MONOCYTES NFR BLD AUTO: 5.6 % — SIGNIFICANT CHANGE UP (ref 1.7–9.3)
NEUTROPHILS # BLD AUTO: 8.3 K/UL — HIGH (ref 1.4–6.5)
NEUTROPHILS NFR BLD AUTO: 85.5 % — HIGH (ref 42.2–75.2)
NRBC # BLD: 0 /100 WBCS — SIGNIFICANT CHANGE UP (ref 0–0)
PLATELET # BLD AUTO: 166 K/UL — SIGNIFICANT CHANGE UP (ref 130–400)
POTASSIUM SERPL-MCNC: 4.4 MMOL/L — SIGNIFICANT CHANGE UP (ref 3.5–5)
POTASSIUM SERPL-SCNC: 4.4 MMOL/L — SIGNIFICANT CHANGE UP (ref 3.5–5)
PROT SERPL-MCNC: 6.7 G/DL — SIGNIFICANT CHANGE UP (ref 6–8)
RBC # BLD: 3.87 M/UL — LOW (ref 4.7–6.1)
RBC # FLD: 15.3 % — HIGH (ref 11.5–14.5)
RSV RESULT: NEGATIVE — SIGNIFICANT CHANGE UP
RSV RNA RESP QL NAA+PROBE: NEGATIVE — SIGNIFICANT CHANGE UP
SODIUM SERPL-SCNC: 141 MMOL/L — SIGNIFICANT CHANGE UP (ref 135–146)
WBC # BLD: 9.7 K/UL — SIGNIFICANT CHANGE UP (ref 4.8–10.8)
WBC # FLD AUTO: 9.7 K/UL — SIGNIFICANT CHANGE UP (ref 4.8–10.8)

## 2019-10-14 PROCEDURE — 93010 ELECTROCARDIOGRAM REPORT: CPT

## 2019-10-14 PROCEDURE — 99285 EMERGENCY DEPT VISIT HI MDM: CPT | Mod: GC

## 2019-10-14 PROCEDURE — 70450 CT HEAD/BRAIN W/O DYE: CPT | Mod: 26

## 2019-10-14 PROCEDURE — 71045 X-RAY EXAM CHEST 1 VIEW: CPT | Mod: 26

## 2019-10-14 RX ORDER — AZITHROMYCIN 500 MG/1
500 TABLET, FILM COATED ORAL ONCE
Refills: 0 | Status: DISCONTINUED | OUTPATIENT
Start: 2019-10-14 | End: 2019-10-14

## 2019-10-14 RX ORDER — SODIUM CHLORIDE 9 MG/ML
1000 INJECTION INTRAMUSCULAR; INTRAVENOUS; SUBCUTANEOUS
Refills: 0 | Status: DISCONTINUED | OUTPATIENT
Start: 2019-10-14 | End: 2019-10-16

## 2019-10-14 RX ORDER — CEFTRIAXONE 500 MG/1
1000 INJECTION, POWDER, FOR SOLUTION INTRAMUSCULAR; INTRAVENOUS EVERY 24 HOURS
Refills: 0 | Status: DISCONTINUED | OUTPATIENT
Start: 2019-10-14 | End: 2019-10-17

## 2019-10-14 RX ORDER — AZITHROMYCIN 500 MG/1
500 TABLET, FILM COATED ORAL EVERY 24 HOURS
Refills: 0 | Status: DISCONTINUED | OUTPATIENT
Start: 2019-10-14 | End: 2019-10-17

## 2019-10-14 RX ORDER — CARVEDILOL PHOSPHATE 80 MG/1
12.5 CAPSULE, EXTENDED RELEASE ORAL
Refills: 0 | Status: DISCONTINUED | OUTPATIENT
Start: 2019-10-14 | End: 2019-10-17

## 2019-10-14 RX ORDER — CEFTRIAXONE 500 MG/1
1000 INJECTION, POWDER, FOR SOLUTION INTRAMUSCULAR; INTRAVENOUS ONCE
Refills: 0 | Status: DISCONTINUED | OUTPATIENT
Start: 2019-10-14 | End: 2019-10-14

## 2019-10-14 RX ORDER — HEPARIN SODIUM 5000 [USP'U]/ML
5000 INJECTION INTRAVENOUS; SUBCUTANEOUS EVERY 8 HOURS
Refills: 0 | Status: DISCONTINUED | OUTPATIENT
Start: 2019-10-14 | End: 2019-10-17

## 2019-10-14 RX ORDER — PANTOPRAZOLE SODIUM 20 MG/1
40 TABLET, DELAYED RELEASE ORAL
Refills: 0 | Status: DISCONTINUED | OUTPATIENT
Start: 2019-10-14 | End: 2019-10-17

## 2019-10-14 RX ORDER — NITROGLYCERIN 6.5 MG
0.4 CAPSULE, EXTENDED RELEASE ORAL
Refills: 0 | Status: DISCONTINUED | OUTPATIENT
Start: 2019-10-14 | End: 2019-10-17

## 2019-10-14 RX ORDER — TAMSULOSIN HYDROCHLORIDE 0.4 MG/1
0.4 CAPSULE ORAL AT BEDTIME
Refills: 0 | Status: DISCONTINUED | OUTPATIENT
Start: 2019-10-14 | End: 2019-10-17

## 2019-10-14 RX ORDER — AZITHROMYCIN 500 MG/1
500 TABLET, FILM COATED ORAL ONCE
Refills: 0 | Status: COMPLETED | OUTPATIENT
Start: 2019-10-14 | End: 2019-10-14

## 2019-10-14 RX ORDER — ASPIRIN/CALCIUM CARB/MAGNESIUM 324 MG
81 TABLET ORAL DAILY
Refills: 0 | Status: DISCONTINUED | OUTPATIENT
Start: 2019-10-14 | End: 2019-10-17

## 2019-10-14 RX ORDER — ACETAMINOPHEN 500 MG
650 TABLET ORAL ONCE
Refills: 0 | Status: COMPLETED | OUTPATIENT
Start: 2019-10-14 | End: 2019-10-14

## 2019-10-14 RX ORDER — CEFTRIAXONE 500 MG/1
1000 INJECTION, POWDER, FOR SOLUTION INTRAMUSCULAR; INTRAVENOUS ONCE
Refills: 0 | Status: COMPLETED | OUTPATIENT
Start: 2019-10-14 | End: 2019-10-14

## 2019-10-14 RX ADMIN — CEFTRIAXONE 100 MILLIGRAM(S): 500 INJECTION, POWDER, FOR SOLUTION INTRAMUSCULAR; INTRAVENOUS at 22:40

## 2019-10-14 RX ADMIN — HEPARIN SODIUM 5000 UNIT(S): 5000 INJECTION INTRAVENOUS; SUBCUTANEOUS at 21:21

## 2019-10-14 RX ADMIN — Medication 650 MILLIGRAM(S): at 10:57

## 2019-10-14 RX ADMIN — SODIUM CHLORIDE 75 MILLILITER(S): 9 INJECTION INTRAMUSCULAR; INTRAVENOUS; SUBCUTANEOUS at 21:21

## 2019-10-14 RX ADMIN — CEFTRIAXONE 100 MILLIGRAM(S): 500 INJECTION, POWDER, FOR SOLUTION INTRAMUSCULAR; INTRAVENOUS at 13:09

## 2019-10-14 RX ADMIN — AZITHROMYCIN 255 MILLIGRAM(S): 500 TABLET, FILM COATED ORAL at 21:30

## 2019-10-14 RX ADMIN — TAMSULOSIN HYDROCHLORIDE 0.4 MILLIGRAM(S): 0.4 CAPSULE ORAL at 21:21

## 2019-10-14 RX ADMIN — AZITHROMYCIN 255 MILLIGRAM(S): 500 TABLET, FILM COATED ORAL at 13:10

## 2019-10-14 RX ADMIN — SODIUM CHLORIDE 75 MILLILITER(S): 9 INJECTION INTRAMUSCULAR; INTRAVENOUS; SUBCUTANEOUS at 18:31

## 2019-10-14 RX ADMIN — Medication 81 MILLIGRAM(S): at 18:08

## 2019-10-14 RX ADMIN — CARVEDILOL PHOSPHATE 12.5 MILLIGRAM(S): 80 CAPSULE, EXTENDED RELEASE ORAL at 17:50

## 2019-10-14 NOTE — ED PROVIDER NOTE - PHYSICAL EXAMINATION
CONSTITUTIONAL: Well-developed; well-nourished; in no acute distress.   SKIN: warm, dry  HEAD: Normocephalic; atraumatic.  EYES: PERRL, EOMI, normal sclera and conjunctiva   ENT: No nasal discharge; airway clear.  NECK: Supple; non tender.  CARD: S1, S2 normal; no murmurs, gallops, or rubs. Regular rate and rhythm.   RESP: clear b/l, no wheezing or crackles  ABD: soft ntnd  EXT: Normal ROM.  No clubbing, cyanosis or edema.   LYMPH: No acute cervical adenopathy.  NEURO: alert, answers questions inappropriately, moves all extremities, sensation intact b/l upper and lower extremities   PSYCH: AAOx1 to name only

## 2019-10-14 NOTE — PATIENT PROFILE ADULT - NSPROMEDSBROUGHTTOHOSP_GEN_A_NUR
"It was a pleasure to see Seth Iglesias today in Neurosurgery Clinic. he is a 59 year old male who underwent:    DATE OF SERVICE:  10/03/2017       PREOPERATIVE DIAGNOSIS:  Left temporal tumor.       POSTOPERATIVE DIAGNOSIS:  Right temporal glioblastoma.       PROCEDURE PERFORMED:    1.  Right temporal craniotomy and tumor resection.   2.  Use of intraoperative microscope.   3.  Use of intraoperative neuro navigation.       ATTENDING SURGEON:  Harry Wiggins MD       RESIDENT SURGEON:    1.  Gale Belle MD   2.  Blanca Swanson MD  3  Angeles Wise MD     FINAL DIAGNOSIS: A) Brain, right temporal tumor, resection:   -Glioblastoma, WHO grade IV (IDH wild-type) -IDH1 (R132H) negative on   immunohistochemistry   -No detected alterations of amino acid sequence of IDH1, IDH2 and TP53   genes per report (please see concurrent report [id number] for additional   details).   -No evidence of MGMT promoter methylation was detected per repot (please   see concurrent report G- for additional details) B) Designated   \"margin\" on requisition form: No tissue was received in pathology for   part B. C) Designated \"core\" on requisition form: No tissue was received   in pathology for part C. D) Brain, right temporal tumor, CUSA contents:   -Glioblastoma, WHO grade IV (IDH wild-type) -IDH1 (R132H) negative on   immunohistochemistry   -No detected alterations of amino acid sequence of IDH1, IDH2 and TP53   genes per report (please see concurrent report [id number] for additional   details).   -No evidence of MGMT promoter methylation was detected per repot (please   see concurrent report G- for additional details)  E) Brain, right   temporal tumor, resection: -Glioblastoma, WHO grade IV (IDH wild-type)   -IDH1 (R132H) negative on immunohistochemistry   -No detected alterations of amino acid sequence of IDH1, IDH2 and TP53   genes per report (please see concurrent report [id number] for additional   details).   -No evidence of " "MGMT promoter methylation was detected per repot (please   see concurrent report G- for additional details)     He is doing well. No major issues. Has completed radiochemotherapy. Will be starting TMZ with optune.    Vitals:    01/22/18 1113   BP: 122/87   Pulse: 74   Resp: 16   Temp: 98.5  F (36.9  C)   TempSrc: Oral   SpO2: 100%   Weight: 94.3 kg (208 lb)   Height: 1.753 m (5' 9\")     Body mass index is 30.72 kg/(m^2).  No Pain (0)     Awake, alert.     Incision well healed.    Neurologically intact.    MRI shows resection cavity with some linear and nodular enhancement around the posterior inferior cavity. rCBV seems low. The imaging was shown to the patient and reviewed in clinic.     A: Glioblastoma. Stable.    P: Beginning TMZ with optune. Could have further surgery if needed. Otherwise can follow up PRN at this point.  " no

## 2019-10-14 NOTE — ED ADULT NURSE NOTE - CHIEF COMPLAINT QUOTE
per EMS, pt has generalized weakness since yesterday. per EMS, history of dementia and at baseline mental status

## 2019-10-14 NOTE — ED ADULT TRIAGE NOTE - CHIEF COMPLAINT QUOTE
per EMS, pt has generalized weakness since yesterday. per EMS, pt has generalized weakness since yesterday. per EMS, history of dementia. per EMS, pt has generalized weakness since yesterday. per EMS, history of dementia and at baseline mental status

## 2019-10-14 NOTE — PATIENT PROFILE ADULT - NSPRESCRUSEDDRG_GEN_A_NUR
tt pt; pt states that 20 mg is too much but 10 mg is not enough, so he has been cutting his 10 mg's in half to make 15 mg per day and pt states this is the amount that keeps the joint pain at bay without so many side effects.  He said he still has 20 mg on hand but needs the 10 mg.  He also stated he has been off of it since Sunday night.  Advised pt that Dr. Bernal is on vacation.    No

## 2019-10-14 NOTE — H&P ADULT - NSHPSOCIALHISTORY_GEN_ALL_CORE
Lives at home with son Lives at home with son, has girlfriend; does not drink/smoke/use illicit drugs

## 2019-10-14 NOTE — H&P ADULT - NSHPREVIEWOFSYSTEMS_GEN_ALL_CORE
REVIEW OF SYSTEMS:  *review of systems limited due patient's AMS / somnolence  CONSTITUTIONAL: +weakness, + fevers,  +chills  EYES/ENT: No visual changes;  No vertigo or throat pain   NECK: No pain or stiffness  RESPIRATORY: + productive cough, no wheezing, no hemoptysis; No shortness of breath  CARDIOVASCULAR: No chest pain or palpitations  GASTROINTESTINAL: No abdominal or epigastric pain. No nausea, vomiting, or hematemesis; No diarrhea or constipation. No melena or hematochezia.  GENITOURINARY: No dysuria, frequency or hematuria  NEUROLOGICAL: change in mental status from baseline ; + weakness  SKIN: No itching, rashes

## 2019-10-14 NOTE — CONSULT NOTE ADULT - ASSESSMENT
Impression:  Right lower lobe infiltrate: cap  HO CABG    Recommendation:  Continue with Azithromycin and Rocephin  Urine legionella and strep antigen  DVT prophylaxis  Out of bed to chair

## 2019-10-14 NOTE — H&P ADULT - NSHPLABSRESULTS_GEN_ALL_CORE
11.5   9.70  )-----------( 166      ( 14 Oct 2019 10:33 )             35.5     10-14    141  |  104  |  25<H>  ----------------------------<  123<H>  4.4   |  21  |  1.8<H>    Ca    9.2      14 Oct 2019 12:10    TPro  6.7  /  Alb  4.2  /  TBili  0.7  /  DBili  x   /  AST  15  /  ALT  11  /  AlkPhos  67  10-14      < from: CT Head No Cont (10.14.19 @ 14:53) >    MPRESSION:    In comparison with the prior noncontrast CT scan of the brain dated [facility]   10, 2019:    Stable examination.    No acute intracranial hemorrhage.      < end of copied text >    < from: Xray Chest 1 View AP/PA (10.14.19 @ 11:59) >    Impression:      Ill-defined right base opacities and prominent interstitial opacities.   Follow-up to resolution is recommended.    < end of copied text >

## 2019-10-14 NOTE — ED PROVIDER NOTE - OBJECTIVE STATEMENT
86 yo male with a history of CAD s/p CABG, GERD, HLD presenting with generalized weakness. As per son, has been weak over the past day and not able to get out of bed associated with productive cough. Prior to illness, patient was fully functional and ambulating with no difficulties. Pt has dementia at baseline but is able to answer questions appropriately. As per son, pt is more disoriented than normal. 86 yo male with a history of CAD s/p CABG, GERD, HLD presenting with generalized weakness. As per son, has been weak over the past day and not able to get out of bed associated with productive cough. Prior to illness, patient was fully functional and ambulating with no difficulties. Pt has dementia at baseline but is able to answer questions appropriately. As per son, pt is more disoriented than normal. Denies recent falls, head injury, vomiting. 86 yo male with a history of CAD s/p CABG, GERD, HLD presenting with generalized weakness. As per son, has been weak over the past day and not able to get out of bed associated with productive cough and fever. Prior to illness, patient was fully functional and ambulating with no difficulties. Pt has dementia at baseline but is as per son, pt is more disoriented than normal. Denies recent falls, head injury, vomiting. 86 yo male with a history of CAD s/p CABG, GERD, HLD, melanoma presenting with generalized weakness. As per son, has been weak over the past day and not able to get out of bed associated with productive cough and fever. Prior to illness, patient was fully functional and ambulating with no difficulties. Pt has dementia at baseline but is as per son, pt is more disoriented than normal. Denies recent falls, head injury, vomiting.
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
## 2019-10-14 NOTE — ED ADULT NURSE NOTE - OBJECTIVE STATEMENT
Patient BIBEMS for increased weakness, dizziness and congestion x 2 days. As per son patient is unable to get up. Baseline patient walks with assistance. On assessment patient A&Ox2- pmh dementia. Patient denies pain or discomfort at this time. Denies n/v/d. +fever.

## 2019-10-14 NOTE — H&P ADULT - ASSESSMENT
88 yo M with PMH CAD s/p CABG, hyperlipidemia, GERD, BPH, and dementia (fully functional) presents with 2 day history of chills, productive cough of sputum, weakness, and decreased PO intake. Per son and girlfriend at bedside, the patient also has a change in mental status. Previously the patient was fully functional at home with ADL and now he has difficulty getting out of bed and is somnolent. Denies CP/SOB, denies abdominal pain, urinary frequency/urgency/discomfort, denies rashes.     # Community Acquired Pneumonia  -  T 101.1 , HR 76, bp 152/62, lactate 1.6  -  qsofa sepsis curb-65 score is 3 (confusion, elevated BUN, and age)  -  fever, productive cough, ams   - (-) flu, (-) RSV  - CXR : right base opacity   - s/p Azithromycin and Rocephin in ED  - IVF @ 75cc/hr (most recent EF from 06/2019 50-55%)  - c/w Rocephin & Azithromycin   - f/u sputum and blood cultures     # AMS  -  likely secondary to metabolic encephalopathy  -  CT head negative for acute process     # CAD s/p CABG   - c/w aspirin, carvedilol, simvastatin    # BPH  - c/w flomax 0.4 mg at bedtime    # CKD - at baseline  - Cr 1.8     # Anemia of chronic disease  - Baseline Hb 12 , current 11.2   - Fe studies, Vitamin B 12 , Folate      # Diet: DASH / TLC  # Activity: out of bed to chair with assistance  # DVT PPX: Heparin subq  # GI PPX: pantoprazole 40 mg before breakfast    FULL CODE

## 2019-10-14 NOTE — ED PROVIDER NOTE - PROGRESS NOTE DETAILS
patient receiving antibiotics. CURB-65 score of 3, will require inpatient admission. CT Head pending ATTENDING NOTE: 88 y/o M p/w AMS and weakness with fever. Pt lives home with son and notes the last few days has been less active and decreased PO. Son noticed cough and sputum. HPI and ROS limited due to mental status. Son says he has been slightly less interactive compared to baseline. Exam as noted. CTAB. RRR. Abdomen soft NTND, (+) bowel sounds. Neuro nonfocal.

## 2019-10-14 NOTE — H&P ADULT - NSHPPHYSICALEXAM_GEN_ALL_CORE
Vital Signs (24 Hrs):  T(C): 38.1 (10-14-19 @ 14:02), Max: 38.4 (10-14-19 @ 10:13)  HR: 76 (10-14-19 @ 10:13) (76 - 76)  BP: 152/62 (10-14-19 @ 10:13) (152/62 - 152/62)  RR: 18 (10-14-19 @ 10:13) (18 - 18)  SpO2: 92% (10-14-19 @ 10:13) (92% - 92%)  Wt(kg): --  Daily     Daily     I&O's Summary    PHYSICAL EXAM:  GENERAL: NAD, lying in bed comfortably, + patient with chills, + somnolence  HEAD:  Atraumatic, Normocephalic  EYES: EOMI, PERRLA, conjunctiva and sclera clear  ENT: + dry mucous membranes  NECK: Supple, No JVD  CHEST/LUNG: Clear to auscultation bilaterally; No rales, rhonchi, wheezing, or rubs. Unlabored respirations  HEART: Regular rate and rhythm; No murmurs, rubs, or gallops  ABDOMEN: Bowel sounds present; Soft, Nontender, Nondistended. No hepatomegally  EXTREMITIES:  2+ Peripheral Pulses, brisk capillary refill. No clubbing, cyanosis, or edema  NERVOUS SYSTEM:  Alert & Oriented X0, speech clear. No deficits   MSK: FROM all 4 extremities, full and equal strength  SKIN: No rashes or lesions

## 2019-10-14 NOTE — PATIENT PROFILE ADULT - NSPROHMSYMPCOND_GEN_A_NUR
BPH (benign prostatic hyperplasia)  HLD (hyperlipidemia)  HTN (hypertension)  CAD (coronary artery disease)

## 2019-10-14 NOTE — H&P ADULT - HISTORY OF PRESENT ILLNESS
88 yo M with PMH CAD s/p CABG, hyperlipidemia, GERD, BPH, and dementia presents with             ED: vital signs T 101.1, bp 152/62, HR 76, RR 18, 92% on RA  XRAY Chest: right base opacity and interstitial opacities  s/p 1 dose of Azithromycin and Rocephin 88 yo M with PMH CAD s/p CABG, hyperlipidemia, GERD, BPH, and dementia (fully functional) presents with 2 day history of chills, productive cough of sputum, weakness, and decreased PO intake. Per son and girlfriend at bedside, the patient also has a change in mental status. Previously the patient was fully functional at home with ADL and now he has difficulty getting out of bed and is somnolent. Denies CP/SOB, denies abdominal pain, urinary frequency/urgency/discomfort, denies rashes.     Note: history obtained from family due to patient's AMS/sommnolence    ED: vital signs T 101.1, bp 152/62, HR 76, RR 18, 92% on RA, WBC 9.70, lactate 1.6 (not septic)  XRAY Chest: right base opacity and interstitial opacities  s/p 1 dose of Azithromycin and Rocephin 86 yo M with PMH CAD s/p CABG, hyperlipidemia, GERD, BPH, and dementia (fully functional) presents with 2 day history of chills, productive cough of sputum, weakness, and decreased PO intake. Per son and girlfriend at bedside, the patient also has a change in mental status. Previously the patient was fully functional at home with ADL and now he has difficulty getting out of bed and is somnolent. Denies CP/SOB, denies abdominal pain, urinary frequency/urgency/discomfort, denies rashes.     Note: history obtained from family due to patient's AMS/sommnolence    ED: vital signs T 101.1, bp 152/62, HR 76, RR 18, 92% on RA, WBC 9.70, lactate 1.6   XRAY Chest: right base opacity and interstitial opacities  s/p 1 dose of Azithromycin and Rocephin

## 2019-10-14 NOTE — CONSULT NOTE ADULT - SUBJECTIVE AND OBJECTIVE BOX
Patient is a 87y old  Male who presents with a chief complaint of     HPI:  86 yo M with PMH CAD s/p CABG, hyperlipidemia, GERD, BPH, and dementia presents with cough for three days and lethargy. Found to have Fever in ER with right lower lobe infiltrate Pulmonary consult called for PNA            ED: vital signs T 101.1, bp 152/62, HR 76, RR 18, 92% on RA  XRAY Chest: right base opacity and interstitial opacities  s/p 1 dose of Azithromycin and Rocephin (14 Oct 2019 15:41)      PAST MEDICAL & SURGICAL HISTORY:  BPH (benign prostatic hyperplasia)  HLD (hyperlipidemia)  HTN (hypertension)  CAD (coronary artery disease)  History of total knee replacement  H/O hernia repair  S/P CABG (coronary artery bypass graft)      SOCIAL HX:   Smoking     : denies                    ETOH      : denies                      Other    FAMILY HISTORY:  .  No cardiovascular or pulmonary family history     Review of System:  See HPI    Allergies    No Known Allergies    PHYSICAL EXAM  Vital Signs Last 24 Hrs  T(C): 38.1 (14 Oct 2019 14:02), Max: 38.4 (14 Oct 2019 10:13)  T(F): 100.5 (14 Oct 2019 14:02), Max: 101.1 (14 Oct 2019 10:13)  HR: 76 (14 Oct 2019 10:13) (76 - 76)  BP: 152/62 (14 Oct 2019 10:13) (152/62 - 152/62)    RR: 18 (14 Oct 2019 10:13) (18 - 18)  SpO2: 92% (14 Oct 2019 10:13) (92% - 92%)    General: In NAD  HEENT: ESTEE             Lymphatic system: No cervical LN     Lungs: Skinny BS  Cardiovascular: Regular  Gastrointestinal: Soft.  + BS   Musculoskeletal: No Clubbing.  Full range of motion.. Moves all extremities  Skin: Warm.  Intact  Neurological: No motor or sensory deficit       LABS:                          11.5   9.70  )-----------( 166      ( 14 Oct 2019 10:33 )             35.5                                               10-14    141  |  104  |  25<H>  ----------------------------<  123<H>  4.4   |  21  |  1.8<H>    Ca    9.2      14 Oct 2019 12:10    TPro  6.7  /  Alb  4.2  /  TBili  0.7  /  DBili  x   /  AST  15  /  ALT  11  /  AlkPhos  67  10-14                                                                                 LIVER FUNCTIONS - ( 14 Oct 2019 12:10 )  Alb: 4.2 g/dL / Pro: 6.7 g/dL / ALK PHOS: 67 U/L / ALT: 11 U/L / AST: 15 U/L / GGT: x                                                       MEDICATIONS  (STANDING):  aspirin  chewable 81 milliGRAM(s) Oral daily  carvedilol Oral Tab/Cap - Peds 12.5 milliGRAM(s) Oral two times a day  pantoprazole    Tablet 40 milliGRAM(s) Oral before breakfast  tamsulosin 0.4 milliGRAM(s) Oral at bedtime    MEDICATIONS  (PRN):  nitroglycerin     SubLingual 0.4 milliGRAM(s) SubLingual every 5 minutes PRN Chest Pain

## 2019-10-14 NOTE — ED ADULT NURSE NOTE - NSIMPLEMENTINTERV_GEN_ALL_ED
Implemented All Fall with Harm Risk Interventions:  Fay to call system. Call bell, personal items and telephone within reach. Instruct patient to call for assistance. Room bathroom lighting operational. Non-slip footwear when patient is off stretcher. Physically safe environment: no spills, clutter or unnecessary equipment. Stretcher in lowest position, wheels locked, appropriate side rails in place. Provide visual cue, wrist band, yellow gown, etc. Monitor gait and stability. Monitor for mental status changes and reorient to person, place, and time. Review medications for side effects contributing to fall risk. Reinforce activity limits and safety measures with patient and family. Provide visual clues: red socks.

## 2019-10-15 LAB
ALBUMIN SERPL ELPH-MCNC: 3.7 G/DL — SIGNIFICANT CHANGE UP (ref 3.5–5.2)
ALP SERPL-CCNC: 56 U/L — SIGNIFICANT CHANGE UP (ref 30–115)
ALT FLD-CCNC: 10 U/L — SIGNIFICANT CHANGE UP (ref 0–41)
ANION GAP SERPL CALC-SCNC: 15 MMOL/L — HIGH (ref 7–14)
AST SERPL-CCNC: 16 U/L — SIGNIFICANT CHANGE UP (ref 0–41)
BASOPHILS # BLD AUTO: 0.01 K/UL — SIGNIFICANT CHANGE UP (ref 0–0.2)
BASOPHILS NFR BLD AUTO: 0.1 % — SIGNIFICANT CHANGE UP (ref 0–1)
BILIRUB SERPL-MCNC: 0.6 MG/DL — SIGNIFICANT CHANGE UP (ref 0.2–1.2)
BUN SERPL-MCNC: 31 MG/DL — HIGH (ref 10–20)
CALCIUM SERPL-MCNC: 9 MG/DL — SIGNIFICANT CHANGE UP (ref 8.5–10.1)
CHLORIDE SERPL-SCNC: 103 MMOL/L — SIGNIFICANT CHANGE UP (ref 98–110)
CO2 SERPL-SCNC: 22 MMOL/L — SIGNIFICANT CHANGE UP (ref 17–32)
CREAT SERPL-MCNC: 2 MG/DL — HIGH (ref 0.7–1.5)
EOSINOPHIL # BLD AUTO: 0 K/UL — SIGNIFICANT CHANGE UP (ref 0–0.7)
EOSINOPHIL NFR BLD AUTO: 0 % — SIGNIFICANT CHANGE UP (ref 0–8)
FERRITIN SERPL-MCNC: 147 NG/ML — SIGNIFICANT CHANGE UP (ref 30–400)
FOLATE SERPL-MCNC: 8.8 NG/ML — SIGNIFICANT CHANGE UP
GLUCOSE SERPL-MCNC: 103 MG/DL — HIGH (ref 70–99)
HCT VFR BLD CALC: 33.3 % — LOW (ref 42–52)
HGB BLD-MCNC: 10.8 G/DL — LOW (ref 14–18)
IMM GRANULOCYTES NFR BLD AUTO: 0.9 % — HIGH (ref 0.1–0.3)
IRON SATN MFR SERPL: 11 UG/DL — LOW (ref 35–150)
IRON SATN MFR SERPL: 5 % — LOW (ref 15–50)
LYMPHOCYTES # BLD AUTO: 1.76 K/UL — SIGNIFICANT CHANGE UP (ref 1.2–3.4)
LYMPHOCYTES # BLD AUTO: 13.4 % — LOW (ref 20.5–51.1)
MCHC RBC-ENTMCNC: 29.9 PG — SIGNIFICANT CHANGE UP (ref 27–31)
MCHC RBC-ENTMCNC: 32.4 G/DL — SIGNIFICANT CHANGE UP (ref 32–37)
MCV RBC AUTO: 92.2 FL — SIGNIFICANT CHANGE UP (ref 80–94)
MONOCYTES # BLD AUTO: 0.67 K/UL — HIGH (ref 0.1–0.6)
MONOCYTES NFR BLD AUTO: 5.1 % — SIGNIFICANT CHANGE UP (ref 1.7–9.3)
NEUTROPHILS # BLD AUTO: 10.6 K/UL — HIGH (ref 1.4–6.5)
NEUTROPHILS NFR BLD AUTO: 80.5 % — HIGH (ref 42.2–75.2)
NRBC # BLD: 0 /100 WBCS — SIGNIFICANT CHANGE UP (ref 0–0)
PLATELET # BLD AUTO: 146 K/UL — SIGNIFICANT CHANGE UP (ref 130–400)
POTASSIUM SERPL-MCNC: 4.6 MMOL/L — SIGNIFICANT CHANGE UP (ref 3.5–5)
POTASSIUM SERPL-SCNC: 4.6 MMOL/L — SIGNIFICANT CHANGE UP (ref 3.5–5)
PROT SERPL-MCNC: 6.2 G/DL — SIGNIFICANT CHANGE UP (ref 6–8)
RBC # BLD: 3.61 M/UL — LOW (ref 4.7–6.1)
RBC # FLD: 15.7 % — HIGH (ref 11.5–14.5)
SODIUM SERPL-SCNC: 140 MMOL/L — SIGNIFICANT CHANGE UP (ref 135–146)
TIBC SERPL-MCNC: 237 UG/DL — SIGNIFICANT CHANGE UP (ref 220–430)
TRANSFERRIN SERPL-MCNC: 206 MG/DL — SIGNIFICANT CHANGE UP (ref 200–360)
UIBC SERPL-MCNC: 226 UG/DL — SIGNIFICANT CHANGE UP (ref 110–370)
VIT B12 SERPL-MCNC: 420 PG/ML — SIGNIFICANT CHANGE UP (ref 232–1245)
WBC # BLD: 13.16 K/UL — HIGH (ref 4.8–10.8)
WBC # FLD AUTO: 13.16 K/UL — HIGH (ref 4.8–10.8)

## 2019-10-15 RX ADMIN — TAMSULOSIN HYDROCHLORIDE 0.4 MILLIGRAM(S): 0.4 CAPSULE ORAL at 21:34

## 2019-10-15 RX ADMIN — CARVEDILOL PHOSPHATE 12.5 MILLIGRAM(S): 80 CAPSULE, EXTENDED RELEASE ORAL at 06:16

## 2019-10-15 RX ADMIN — SODIUM CHLORIDE 75 MILLILITER(S): 9 INJECTION INTRAMUSCULAR; INTRAVENOUS; SUBCUTANEOUS at 12:46

## 2019-10-15 RX ADMIN — CEFTRIAXONE 100 MILLIGRAM(S): 500 INJECTION, POWDER, FOR SOLUTION INTRAMUSCULAR; INTRAVENOUS at 23:29

## 2019-10-15 RX ADMIN — AZITHROMYCIN 255 MILLIGRAM(S): 500 TABLET, FILM COATED ORAL at 21:29

## 2019-10-15 RX ADMIN — CARVEDILOL PHOSPHATE 12.5 MILLIGRAM(S): 80 CAPSULE, EXTENDED RELEASE ORAL at 17:05

## 2019-10-15 RX ADMIN — HEPARIN SODIUM 5000 UNIT(S): 5000 INJECTION INTRAVENOUS; SUBCUTANEOUS at 21:34

## 2019-10-15 RX ADMIN — Medication 81 MILLIGRAM(S): at 12:47

## 2019-10-15 RX ADMIN — HEPARIN SODIUM 5000 UNIT(S): 5000 INJECTION INTRAVENOUS; SUBCUTANEOUS at 12:47

## 2019-10-15 RX ADMIN — HEPARIN SODIUM 5000 UNIT(S): 5000 INJECTION INTRAVENOUS; SUBCUTANEOUS at 06:17

## 2019-10-15 NOTE — CONSULT NOTE ADULT - SUBJECTIVE AND OBJECTIVE BOX
HPI:  88 yo M with PMH CAD s/p CABG, hyperlipidemia, GERD, BPH, and dementia (fully functional) presents with 2 day history of chills, productive cough of sputum, weakness, and decreased PO intake. Per son and girlfriend at bedside, the patient also has a change in mental status. Previously the patient was fully functional at home with ADL and now he has difficulty getting out of bed and is somnolent. Denies CP/SOB, denies abdominal pain, urinary frequency/urgency/discomfort, denies rashes.     Note: history obtained from family due to patient's AMS/sommnolence    ED: vital signs T 101.1, bp 152/62, HR 76, RR 18, 92% on RA, WBC 9.70, lactate 1.6   XRAY Chest: right base opacity and interstitial opacities  s/p 1 dose of Azithromycin and Rocephin (14 Oct 2019 15:41)      PAST MEDICAL & SURGICAL HISTORY:  BPH (benign prostatic hyperplasia)  HLD (hyperlipidemia)  HTN (hypertension)  CAD (coronary artery disease)  History of total knee replacement  H/O hernia repair  S/P CABG (coronary artery bypass graft)      Hospital Course:    TODAY'S SUBJECTIVE & REVIEW OF SYMPTOMS:     Constitutional WNL   Cardio WNL   Resp WNL   GI WNL  Heme WNL  Endo WNL  Skin WNL  MSK Weakness  Neuro WNL  Cognitive WNL  Psych WNL      MEDICATIONS  (STANDING):  aspirin  chewable 81 milliGRAM(s) Oral daily  azithromycin  IVPB 500 milliGRAM(s) IV Intermittent every 24 hours  carvedilol Oral Tab/Cap - Peds 12.5 milliGRAM(s) Oral two times a day  cefTRIAXone   IVPB 1000 milliGRAM(s) IV Intermittent every 24 hours  heparin  Injectable 5000 Unit(s) SubCutaneous every 8 hours  pantoprazole    Tablet 40 milliGRAM(s) Oral before breakfast  sodium chloride 0.9%. 1000 milliLiter(s) (75 mL/Hr) IV Continuous <Continuous>  tamsulosin 0.4 milliGRAM(s) Oral at bedtime    MEDICATIONS  (PRN):  nitroglycerin     SubLingual 0.4 milliGRAM(s) SubLingual every 5 minutes PRN Chest Pain      FAMILY HISTORY:      Allergies    No Known Allergies    Intolerances        SOCIAL HISTORY:    [  ] Etoh  [  ] Smoking  [  ] Substance abuse     Home Environment:  [  ] Home Alone  [ x ] Lives with Family  [  ] Home Health Aid    Dwelling:  [  ] Apartment  [ x ] Private House  [  ] Adult Home  [  ] Skilled Nursing Facility      [  ] Short Term  [  ] Long Term  [x  ] Stairs       Elevator [  ]    FUNCTIONAL STATUS PTA: (Check all that apply)  Ambulation: [ x  ]Independent    [  ] Dependent     [  ] Non-Ambulatory  Assistive Device: [  ] SA Cane  [  ]  Q Cane  [x  ] Walker  [  ]  Wheelchair  ADL : [x  ] Independent  [  ]  Dependent       Vital Signs Last 24 Hrs  T(C): 36.6 (15 Oct 2019 13:42), Max: 37.9 (15 Oct 2019 04:43)  T(F): 97.9 (15 Oct 2019 13:42), Max: 100.2 (15 Oct 2019 04:43)  HR: 62 (15 Oct 2019 13:42) (62 - 74)  BP: 106/53 (15 Oct 2019 13:42) (106/53 - 129/61)  BP(mean): --  RR: 18 (15 Oct 2019 13:42) (18 - 99)  SpO2: 94% (15 Oct 2019 08:15) (88% - 97%)      PHYSICAL EXAM: Alert & awake  GENERAL: NAD  HEAD:  Atraumatic, Normocephalic  CHEST/LUNG: Clear   HEART: S1S2+  ABDOMEN: Soft, Nontender  EXTREMITIES:  no calf tenderness    NERVOUS SYSTEM:  Cranial Nerves 2-12 intact [  ] Abnormal  [  ]  ROM: WFL all extremities [  ]  Abnormal [x  ]able to move all ext  Motor Strength: WFL all extremities  [  ]  Abnormal [x  ]  Sensation: intact to light touch [  ] Abnormal [  ]  Reflexes: Symmetric [  ]  Abnormal [  ]    FUNCTIONAL STATUS:  Bed Mobility: Independent [  ]  Supervision [  ]  Needs Assistance [x  ]  N/A [  ]  Transfers: Independent [  ]  Supervision [  ]  Needs Assistance [ x ]  N/A [  ]   Ambulation: Independent [  ]  Supervision [  ]  Needs Assistance [  ]  N/A [  ]  ADL: Independent [  ] Requires Assistance [  ] N/A [  ]      LABS:                        10.8   13.16 )-----------( 146      ( 15 Oct 2019 06:43 )             33.3     10-15    140  |  103  |  31<H>  ----------------------------<  103<H>  4.6   |  22  |  2.0<H>    Ca    9.0      15 Oct 2019 06:43    TPro  6.2  /  Alb  3.7  /  TBili  0.6  /  DBili  x   /  AST  16  /  ALT  10  /  AlkPhos  56  10-15          RADIOLOGY & ADDITIONAL STUDIES:    Assesment:

## 2019-10-15 NOTE — CONSULT NOTE ADULT - ASSESSMENT
Pneumonia  Dementia  CKD    cont iv abx  f/u pan cultures  aspiration precautions  albuterol nebs prn  monitor o2 sat  repeat cxr 24 hours  dvt/gi px

## 2019-10-15 NOTE — CONSULT NOTE ADULT - ASSESSMENT

## 2019-10-15 NOTE — PROGRESS NOTE ADULT - SUBJECTIVE AND OBJECTIVE BOX
SUBJECTIVE:    Patient is a 87y old  Male who presents with a chief complaint of PNA (14 Oct 2019 16:33)    Currently admitted to medicine with the primary diagnosis of Pneumonia     Today is hospital day 1d. Patient was seen and examined at bedside. This morning he is resting comfortably in bed and reports no new issues or overnight events. Patient was alert to person, place, and birth date. Patient did not know date/ month. Patient states cough is getting better, dry cough with no sputum. Patient denies pain on inspiration. Patient denies nausea, vomiting, abdominal pain, or chest pain.     PAST MEDICAL & SURGICAL HISTORY  PAST MEDICAL & SURGICAL HISTORY:  BPH (benign prostatic hyperplasia)  HLD (hyperlipidemia)  HTN (hypertension)  CAD (coronary artery disease)  History of total knee replacement  H/O hernia repair  S/P CABG (coronary artery bypass graft)    SOCIAL HISTORY:    ALLERGIES:  No Known Allergies    MEDICATIONS:  STANDING MEDICATIONS  aspirin  chewable 81 milliGRAM(s) Oral daily  azithromycin  IVPB 500 milliGRAM(s) IV Intermittent every 24 hours  carvedilol Oral Tab/Cap - Peds 12.5 milliGRAM(s) Oral two times a day  cefTRIAXone   IVPB 1000 milliGRAM(s) IV Intermittent every 24 hours  heparin  Injectable 5000 Unit(s) SubCutaneous every 8 hours  pantoprazole    Tablet 40 milliGRAM(s) Oral before breakfast  sodium chloride 0.9%. 1000 milliLiter(s) IV Continuous <Continuous>  tamsulosin 0.4 milliGRAM(s) Oral at bedtime    PRN MEDICATIONS  nitroglycerin     SubLingual 0.4 milliGRAM(s) SubLingual every 5 minutes PRN    VITALS:   T(F): 100.2  HR: 70  BP: 129/61  RR: 18  SpO2: 94%    LABS:                        10.8   13.16 )-----------( 146      ( 15 Oct 2019 06:43 )             33.3     10-15    140  |  103  |  31<H>  ----------------------------<  103<H>  4.6   |  22  |  2.0<H>    Ca    9.0      15 Oct 2019 06:43    TPro  6.2  /  Alb  3.7  /  TBili  0.6  /  DBili  x   /  AST  16  /  ALT  10  /  AlkPhos  56  10-15          Lactate, Blood: 1.6 mmol/L (10-14-19 @ 14:50)          RADIOLOGY:  < from: CT Head No Cont (10.14.19 @ 14:53) >  INTERPRETATION:  Clinical History / Reason for exam: Weakness, change in   mental status.    CT SCAN OF THE BRAIN WITHOUT CONTRAST    TECHNIQUE:    Multiple transaxial noncontrast CTimages of the brain were obtained from   base to vertex. Sagittal and coronal reformatted images were obtained.    COMPARISON:    Noncontrast CT scan of the brain dated [facility] 10, 2019.    FINDINGS:    The third and lateral ventricles are mildly enlarged as are the cortical   sulci consistent with a mild degree of cortical atrophy.  The fourth   ventricle is normal in size and position.    There is no shift of the midline structures, evidence of acute   intracranial hemorrhage, or depressed skull fracture.    Patchy foci of diminished attenuation in the periventricular white   matter. These findings are nonspecific in appearance and differential   diagnostic possibilities include ischemic change, foci of gliosis or   demyelination.    IMPRESSION:    In comparison with the prior noncontrast CT scan of the brain dated [facility]   10, 2019:    Stable examination.    No acute intracranial hemorrhage.    < end of copied text >    PHYSICAL EXAM:  GENERAL: NAD, speaks in full sentences, no signs of respiratory distress  HEAD: Atraumatic, normocephalic  NECK: Supple  CHEST/LUNG: Clear to auscultation bilaterally; No wheeze or crackles  HEART: S1, S2; RRR; No murmurs, rubs, or gallops  ABDOMEN: BS+; Soft, Non-tender, Non-distended  EXTREMITIES:  2+ Peripheral Pulses  PSYCH: AAOx3   NEUROLOGY: non-focal  SKIN: No rashes or lesions

## 2019-10-15 NOTE — PROGRESS NOTE ADULT - SUBJECTIVE AND OBJECTIVE BOX
CHICHO CALLI  87y  Male      SUBJECTIVE:      88 yo M with PMH CAD s/p CABG, hyperlipidemia, GERD, BPH, melanoma ,ckd,mild cognitive impairment,hypertension ,right TTKR presents with 2 day history of chills, productive cough of sputum, weakness, and decreased PO intake. Per son and girlfriend at bedside, the patient also has a change in mental status. Previously the patient was fully functional at home with ADL and now he has difficulty getting out of bed and is somnolent. Denies CP/SOB, denies abdominal pain, urinary frequency/urgency/discomfort, denies rashes.     Attending Admission Note:      REVIEW OF SYSTEMS:    T(C): 37.9 (10-15-19 @ 04:43), Max: 38.4 (10-14-19 @ 10:13)  HR: 70 (10-15-19 @ 04:43) (70 - 76)  BP: 129/61 (10-15-19 @ 04:43) (110/68 - 152/62)  RR: 18 (10-15-19 @ 04:43) (18 - 99)  SpO2: 94% (10-15-19 @ 08:15) (88% - 97%)  Wt(kg): --Vital Signs Last 24 Hrs  T(C): 37.9 (15 Oct 2019 04:43), Max: 38.4 (14 Oct 2019 10:13)  T(F): 100.2 (15 Oct 2019 04:43), Max: 101.1 (14 Oct 2019 10:13)  HR: 70 (15 Oct 2019 04:43) (70 - 76)  BP: 129/61 (15 Oct 2019 04:43) (110/68 - 152/62)  BP(mean): --  RR: 18 (15 Oct 2019 04:43) (18 - 99)  SpO2: 94% (15 Oct 2019 08:15) (88% - 97%)    PHYSICAL EXAM:  lungs-clear  cor-reg,nl s1 &s2  abd-soft,non tender  ext-no calf tenderness  neuro -alert,knew my name and that he is in hospital but not the name  LABS:                          10.8   13.16 )-----------( 146      ( 15 Oct 2019 06:43 )             33.3   10-15    140  |  103  |  31<H>  ----------------------------<  103<H>  4.6   |  22  |  2.0<H>    Ca    9.0      15 Oct 2019 06:43    TPro  6.2  /  Alb  3.7  /  TBili  0.6  /  DBili  x   /  AST  16  /  ALT  10  /  AlkPhos  56  10-15    RADIOLOGY:    < from: CT Head No Cont (10.14.19 @ 14:53) >    EXAM:  CT BRAIN            PROCEDURE DATE:  10/14/2019            INTERPRETATION:  Clinical History / Reason for exam: Weakness, change in   mental status.    CT SCAN OF THE BRAIN WITHOUT CONTRAST    TECHNIQUE:    Multiple transaxial noncontrast CTimages of the brain were obtained from   base to vertex. Sagittal and coronal reformatted images were obtained.    COMPARISON:    Noncontrast CT scan of the brain dated Jacque 10, 2019.    FINDINGS:    The third and lateral ventricles are mildly enlarged as are the cortical   sulci consistent with a mild degree of cortical atrophy.  The fourth   ventricle is normal in size and position.    There is no shift of the midline structures, evidence of acute   intracranial hemorrhage, or depressed skull fracture.    Patchy foci of diminished attenuation in the periventricular white   matter. These findings are nonspecific in appearance and differential   diagnostic possibilities include ischemic change, foci of gliosis or   demyelination.    IMPRESSION:    In comparison with the prior noncontrast CT scan of the brain dated Jacque   10, 2019:    Stable examination.    No acute intracranial hemorrhage.                    RONNIE BRAY M.D., ATTENDING RADIOLOGIST  This document has been electronically signed. Oct 14 2019  2:59PM              < end of copied text >    < from: Xray Chest 1 View AP/PA (10.14.19 @ 11:59) >    EXAM:  XR CHEST FRONTAL 1V            PROCEDURE DATE:  10/14/2019            INTERPRETATION:  Clinical History / Reason for exam: Chest pain, CABG    Comparison : Chest radiograph 6/9/2019.    Technique/Positioning: Single AP view.    Findings:    Support devices: None.    Cardiac/mediastinum/hilum: Median sternotomy.    Lung parenchyma/Pleura: There are mildly prominent interstitial markings.  Ill-defined right base opacity. No pneumothorax    Skeleton/soft tissues: Stable.    Impression:      Ill-defined right base opacities and prominent interstitial opacities.   Follow-up to resolution is recommended.                  JOSUE TRUJILLO M.D., ATTENDING RADIOLOGIST  This document has been electronically signed. Oct 14 2019  1:28PM    < end of copied text >    < from: 12 Lead ECG (10.14.19 @ 11:38) >    Ventricular Rate 81 BPM    Atrial Rate 81 BPM    P-R Interval 188 ms    QRS Duration 76 ms    Q-T Interval 362 ms    QTC Calculation(Bezet) 420 ms    P Axis 48 degrees    R Axis 69 degrees    T Axis 43 degrees    Diagnosis Line Normal sinus rhythm  Normal ECG    Confirmed by BIRD ESTEVES MD ([id number]) on 10/14/2019 12:43:57 PM    < end of copied text >    IMPRESSION:  RLL pneumonia  acute on CKD  anemia- slightly worse  mild cognitive impairment  right TKR  s/p melanoma 2006  cad/cabg -stable  bph  htn-stable    PLAN:  culture fully  iv antibiotics  pulm consult appreciated  monitor urine output  dvt prophylaxis  recheck cbc/bmp/fe/tibc/b12/folate    I SPENT 50 MINUTES IN TOTAL EXAMINING,COUNSELING PATIENT AND COORDINATING CARE

## 2019-10-15 NOTE — CONSULT NOTE ADULT - SUBJECTIVE AND OBJECTIVE BOX
CALLI VALENTINO  MRN-[id number]    HISTORY OF PRESENT ILLNESS:    86 yo M with PMH CAD s/p CABG, hyperlipidemia, GERD, BPH, and dementia (fully functional) presents with 2 day history of chills, productive cough of sputum, weakness, and decreased PO intake. Per son and girlfriend at bedside, the patient also has a change in mental status. Previously the patient was fully functional at home with ADL and now he has difficulty getting out of bed and is somnolent. Denies CP/SOB, denies abdominal pain, urinary frequency/urgency/discomfort, denies rashes.     Note: history obtained from family due to patient's AMS/sommnolence    ED: vital signs T 101.1, bp 152/62, HR 76, RR 18, 92% on RA, WBC 9.70, lactate 1.6   XRAY Chest: right base opacity and interstitial opacities  s/p 1 dose of Azithromycin and Rocephin (14 Oct 2019 15:41)      PMH/PSH:  PAST MEDICAL & SURGICAL HISTORY:  BPH (benign prostatic hyperplasia)  HLD (hyperlipidemia)  HTN (hypertension)  CAD (coronary artery disease)  History of total knee replacement  H/O hernia repair  S/P CABG (coronary artery bypass graft)    ALLERGIES:  Allergies    No Known Allergies    Intolerances      SOCIAL HABITS:  ex smoker    FAMILY HISTORY:   FAMILY HISTORY:      REVIEW OF SYSTEM:  Elements of review of systems are negative or non-applicable except as noted above in HPI section.       HOME MEDICATIONS:  aspirin 81 mg oral tablet, chewable  carvedilol 12.5 mg oral tablet  Nitrostat 0.4 mg sublingual tablet  pantoprazole 40 mg oral delayed release tablet  tamsulosin 0.4 mg oral capsule    MEDICATIONS:  MEDICATIONS  (STANDING):  aspirin  chewable 81 milliGRAM(s) Oral daily  azithromycin  IVPB 500 milliGRAM(s) IV Intermittent every 24 hours  carvedilol Oral Tab/Cap - Peds 12.5 milliGRAM(s) Oral two times a day  cefTRIAXone   IVPB 1000 milliGRAM(s) IV Intermittent every 24 hours  heparin  Injectable 5000 Unit(s) SubCutaneous every 8 hours  pantoprazole    Tablet 40 milliGRAM(s) Oral before breakfast  sodium chloride 0.9%. 1000 milliLiter(s) (75 mL/Hr) IV Continuous <Continuous>  tamsulosin 0.4 milliGRAM(s) Oral at bedtime    MEDICATIONS  (PRN):  nitroglycerin     SubLingual 0.4 milliGRAM(s) SubLingual every 5 minutes PRN Chest Pain        VITALS:   Vital Signs Last 24 Hrs  T(C): 36.6 (15 Oct 2019 13:42), Max: 37.9 (15 Oct 2019 04:43)  T(F): 97.9 (15 Oct 2019 13:42), Max: 100.2 (15 Oct 2019 04:43)  HR: 62 (15 Oct 2019 13:42) (62 - 74)  BP: 106/53 (15 Oct 2019 13:42) (106/53 - 129/61)  BP(mean): --  RR: 18 (15 Oct 2019 13:42) (18 - 99)  SpO2: 94% (15 Oct 2019 08:15) (88% - 97%)        PHYSICAL EXAM:    GENERAL: NAD  HEAD:  Atraumatic, Normocephalic  NECK: Supple, No JVD  CHEST/LUNG: Clear to auscultation bilaterally;   HEART: Regular rate and rhythm; No murmurs  ABDOMEN: Soft, Nontender, Nondistended  EXTREMITIES:  Good peripheral Pulses, No clubbing, cyanosis, or edema      LABS:                        10.8   13.16 )-----------( 146      ( 15 Oct 2019 06:43 )             33.3     10-15    140  |  103  |  31<H>  ----------------------------<  103<H>  4.6   |  22  |  2.0<H>    Ca    9.0      15 Oct 2019 06:43    TPro  6.2  /  Alb  3.7  /  TBili  0.6  /  DBili  x   /  AST  16  /  ALT  10  /  AlkPhos  56  10-15    LIVER FUNCTIONS - ( 15 Oct 2019 06:43 )  Alb: 3.7 g/dL / Pro: 6.2 g/dL / ALK PHOS: 56 U/L / ALT: 10 U/L / AST: 16 U/L / GGT: x                         ABG & VENT SETTINGS (when applicable)        DIAGNOSTIC STUDIES:

## 2019-10-15 NOTE — SWALLOW BEDSIDE ASSESSMENT ADULT - COMMENTS
Pt known to ST service from June 2019 w/ reccs for nectar, upgraded to thins, no instrumental swallow study in the past. +toleration w/o any immediate overt s/s of penetration/aspiration w/ nectar +toleration w/o any overt s/s of penetration/aspiration w/ puree and reg solids

## 2019-10-16 LAB
ALBUMIN SERPL ELPH-MCNC: 3 G/DL — LOW (ref 3.5–5.2)
ALP SERPL-CCNC: 62 U/L — SIGNIFICANT CHANGE UP (ref 30–115)
ALT FLD-CCNC: 9 U/L — SIGNIFICANT CHANGE UP (ref 0–41)
ANION GAP SERPL CALC-SCNC: 14 MMOL/L — SIGNIFICANT CHANGE UP (ref 7–14)
AST SERPL-CCNC: 14 U/L — SIGNIFICANT CHANGE UP (ref 0–41)
BASOPHILS # BLD AUTO: 0.02 K/UL — SIGNIFICANT CHANGE UP (ref 0–0.2)
BASOPHILS NFR BLD AUTO: 0.1 % — SIGNIFICANT CHANGE UP (ref 0–1)
BILIRUB SERPL-MCNC: 0.4 MG/DL — SIGNIFICANT CHANGE UP (ref 0.2–1.2)
BUN SERPL-MCNC: 29 MG/DL — HIGH (ref 10–20)
CALCIUM SERPL-MCNC: 8.5 MG/DL — SIGNIFICANT CHANGE UP (ref 8.5–10.1)
CHLORIDE SERPL-SCNC: 103 MMOL/L — SIGNIFICANT CHANGE UP (ref 98–110)
CO2 SERPL-SCNC: 22 MMOL/L — SIGNIFICANT CHANGE UP (ref 17–32)
CREAT SERPL-MCNC: 1.8 MG/DL — HIGH (ref 0.7–1.5)
EOSINOPHIL # BLD AUTO: 0.16 K/UL — SIGNIFICANT CHANGE UP (ref 0–0.7)
EOSINOPHIL NFR BLD AUTO: 1.2 % — SIGNIFICANT CHANGE UP (ref 0–8)
GLUCOSE SERPL-MCNC: 93 MG/DL — SIGNIFICANT CHANGE UP (ref 70–99)
HCT VFR BLD CALC: 31.8 % — LOW (ref 42–52)
HGB BLD-MCNC: 10.4 G/DL — LOW (ref 14–18)
IMM GRANULOCYTES NFR BLD AUTO: 1.6 % — HIGH (ref 0.1–0.3)
LYMPHOCYTES # BLD AUTO: 1.42 K/UL — SIGNIFICANT CHANGE UP (ref 1.2–3.4)
LYMPHOCYTES # BLD AUTO: 10.2 % — LOW (ref 20.5–51.1)
MAGNESIUM SERPL-MCNC: 1.8 MG/DL — SIGNIFICANT CHANGE UP (ref 1.8–2.4)
MCHC RBC-ENTMCNC: 30.4 PG — SIGNIFICANT CHANGE UP (ref 27–31)
MCHC RBC-ENTMCNC: 32.7 G/DL — SIGNIFICANT CHANGE UP (ref 32–37)
MCV RBC AUTO: 93 FL — SIGNIFICANT CHANGE UP (ref 80–94)
MONOCYTES # BLD AUTO: 0.72 K/UL — HIGH (ref 0.1–0.6)
MONOCYTES NFR BLD AUTO: 5.2 % — SIGNIFICANT CHANGE UP (ref 1.7–9.3)
NEUTROPHILS # BLD AUTO: 11.34 K/UL — HIGH (ref 1.4–6.5)
NEUTROPHILS NFR BLD AUTO: 81.7 % — HIGH (ref 42.2–75.2)
NRBC # BLD: 0 /100 WBCS — SIGNIFICANT CHANGE UP (ref 0–0)
PLATELET # BLD AUTO: 140 K/UL — SIGNIFICANT CHANGE UP (ref 130–400)
POTASSIUM SERPL-MCNC: 4.3 MMOL/L — SIGNIFICANT CHANGE UP (ref 3.5–5)
POTASSIUM SERPL-SCNC: 4.3 MMOL/L — SIGNIFICANT CHANGE UP (ref 3.5–5)
PROT SERPL-MCNC: 5.3 G/DL — LOW (ref 6–8)
RBC # BLD: 3.42 M/UL — LOW (ref 4.7–6.1)
RBC # FLD: 15.7 % — HIGH (ref 11.5–14.5)
SODIUM SERPL-SCNC: 139 MMOL/L — SIGNIFICANT CHANGE UP (ref 135–146)
WBC # BLD: 13.88 K/UL — HIGH (ref 4.8–10.8)
WBC # FLD AUTO: 13.88 K/UL — HIGH (ref 4.8–10.8)

## 2019-10-16 PROCEDURE — 71045 X-RAY EXAM CHEST 1 VIEW: CPT | Mod: 26

## 2019-10-16 RX ADMIN — TAMSULOSIN HYDROCHLORIDE 0.4 MILLIGRAM(S): 0.4 CAPSULE ORAL at 21:45

## 2019-10-16 RX ADMIN — CARVEDILOL PHOSPHATE 12.5 MILLIGRAM(S): 80 CAPSULE, EXTENDED RELEASE ORAL at 17:02

## 2019-10-16 RX ADMIN — Medication 81 MILLIGRAM(S): at 12:55

## 2019-10-16 RX ADMIN — HEPARIN SODIUM 5000 UNIT(S): 5000 INJECTION INTRAVENOUS; SUBCUTANEOUS at 05:25

## 2019-10-16 RX ADMIN — CEFTRIAXONE 100 MILLIGRAM(S): 500 INJECTION, POWDER, FOR SOLUTION INTRAMUSCULAR; INTRAVENOUS at 21:48

## 2019-10-16 RX ADMIN — AZITHROMYCIN 255 MILLIGRAM(S): 500 TABLET, FILM COATED ORAL at 20:55

## 2019-10-16 RX ADMIN — HEPARIN SODIUM 5000 UNIT(S): 5000 INJECTION INTRAVENOUS; SUBCUTANEOUS at 12:55

## 2019-10-16 RX ADMIN — HEPARIN SODIUM 5000 UNIT(S): 5000 INJECTION INTRAVENOUS; SUBCUTANEOUS at 21:48

## 2019-10-16 RX ADMIN — CARVEDILOL PHOSPHATE 12.5 MILLIGRAM(S): 80 CAPSULE, EXTENDED RELEASE ORAL at 05:25

## 2019-10-16 NOTE — SWALLOW BEDSIDE ASSESSMENT ADULT - SWALLOW EVAL: DIAGNOSIS
suspected pharyngeal dysphagia; pt warrants instrumental assessment
+overt s/s of penetration/aspiration w/ thin, +toleration of nectar, puree and regular solids w/o immediate overt s/s of penetration/aspiration

## 2019-10-16 NOTE — SWALLOW BEDSIDE ASSESSMENT ADULT - SLP GENERAL OBSERVATIONS
Pt received in bed awake and alert on O2 NC w/ significant other at b/s
Pt received in bed awake and alert on O2 NCson present at bedside. dysphonic strained vocal quality. some forgetfullness noted

## 2019-10-16 NOTE — PROGRESS NOTE ADULT - SUBJECTIVE AND OBJECTIVE BOX
CHICHOCALLI  87y, Male  Allergy: No Known Allergies      LAST 24-Hr EVENTS:  feels better, still coughing  VITALS:  T(F): 98.8 (10-16-19 @ 04:50), Max: 100.4 (10-15-19 @ 21:15)  HR: 60 (10-16-19 @ 04:50)  BP: 139/67 (10-16-19 @ 04:50) (102/54 - 139/67)  RR: 20 (10-16-19 @ 04:50)  SpO2: 94% (10-16-19 @ 07:36)low flow oxygen    PHYSICAL EXAM:    GENERAL: NAD, well-developed  HEAD:  Atraumatic, Normocephalic  NECK: Supple, No JVD  CHEST/LUNG: Clear to auscultation bilaterally; No wheeze  HEART: Regular rate and rhythm; No murmurs, rubs, or gallops  ABDOMEN: Soft, Nontender, Nondistended; Bowel sounds present  EXTREMITIES:  2+ Peripheral Pulses, No clubbing, cyanosis, or edema        TESTS & MEASUREMENTS:                          10.4   13.88 )-----------( 140      ( 16 Oct 2019 05:56 )             31.8       10-16    139  |  103  |  29<H>  ----------------------------<  93  4.3   |  22  |  1.8<H>    Ca    8.5      16 Oct 2019 05:56  Mg     1.8     10-16    TPro  5.3<L>  /  Alb  3.0<L>  /  TBili  0.4  /  DBili  x   /  AST  14  /  ALT  9   /  AlkPhos  62  10-16    LIVER FUNCTIONS - ( 16 Oct 2019 05:56 )  Alb: 3.0 g/dL / Pro: 5.3 g/dL / ALK PHOS: 62 U/L / ALT: 9 U/L / AST: 14 U/L / GGT: x                 Culture - Blood (collected 10-14-19 @ 10:33)  Source: .Blood Blood  Preliminary Report (10-15-19 @ 17:01):    No growth to date.          ABG & VENT SETTINGS: (when applicable)    n/a    RADIOLOGY & ADDITIONAL TESTS:    MEDICATIONS:  MEDICATIONS  (STANDING):  aspirin  chewable 81 milliGRAM(s) Oral daily  azithromycin  IVPB 500 milliGRAM(s) IV Intermittent every 24 hours  carvedilol Oral Tab/Cap - Peds 12.5 milliGRAM(s) Oral two times a day  cefTRIAXone   IVPB 1000 milliGRAM(s) IV Intermittent every 24 hours  heparin  Injectable 5000 Unit(s) SubCutaneous every 8 hours  pantoprazole    Tablet 40 milliGRAM(s) Oral before breakfast  tamsulosin 0.4 milliGRAM(s) Oral at bedtime    MEDICATIONS  (PRN):  nitroglycerin     SubLingual 0.4 milliGRAM(s) SubLingual every 5 minutes PRN Chest Pain

## 2019-10-16 NOTE — PROGRESS NOTE ADULT - SUBJECTIVE AND OBJECTIVE BOX
CALLI VALENTINO  87y  Male      SUBJECTIVE:  no c/o    Progress Note:      REVIEW OF SYSTEMS:    T(C): 37.1 (10-16-19 @ 04:50), Max: 38 (10-15-19 @ 21:15)  HR: 60 (10-16-19 @ 04:50) (60 - 62)  BP: 139/67 (10-16-19 @ 04:50) (102/54 - 139/67)  RR: 20 (10-16-19 @ 04:50) (18 - 20)  SpO2: 94% (10-16-19 @ 07:36) (94% - 96%)  Wt(kg): --Vital Signs Last 24 Hrs  T(C): 37.1 (16 Oct 2019 04:50), Max: 38 (15 Oct 2019 21:15)  T(F): 98.8 (16 Oct 2019 04:50), Max: 100.4 (15 Oct 2019 21:15)  HR: 60 (16 Oct 2019 04:50) (60 - 62)  BP: 139/67 (16 Oct 2019 04:50) (102/54 - 139/67)  BP(mean): --  RR: 20 (16 Oct 2019 04:50) (18 - 20)  SpO2: 94% (16 Oct 2019 07:36) (94% - 96%)    PHYSICAL EXAM:  lungs-clear  cor reg,nl s1 &s2  neuro-no focal,alert,knew my name  LABS:                        10.4   13.88 )-----------( 140      ( 16 Oct 2019 05:56 )             31.8     10-16    139  |  103  |  29<H>  ----------------------------<  93  4.3   |  22  |  1.8<H>    Ca    8.5      16 Oct 2019 05:56  Mg     1.8     10-16    TPro  5.3<L>  /  Alb  3.0<L>  /  TBili  0.4  /  DBili  x   /  AST  14  /  ALT  9   /  AlkPhos  62  10-16    RADIOLOGY:      IMPRESSION:  RLL pneumonia  acute on CKD-resolved  anemia- slightly worse  mild cognitive impairment -today more alert  right TKR  s/p melanoma 2006  cad/cabg -stable  bph  htn-stable    PLAN:  cultures pending  iv antibiotics  pulm consult appreciated  dvt prophylaxis  recheck cbc/bmp/fe/tibc/b12/folate  rehab/pt  I SPENT 30  MINUTES IN TOTAL EXAMINING,COUNSELING PATIENT AND COORDINATING CARE

## 2019-10-16 NOTE — PROGRESS NOTE ADULT - SUBJECTIVE AND OBJECTIVE BOX
SUBJECTIVE:    Patient is a 87y old  Male who presents with a chief complaint of PNA (14 Oct 2019 16:33)    Currently admitted to medicine with the primary diagnosis of Pneumonia     Today is hospital day 2d. Patient was seen and examined at bedside. This morning he is resting comfortably in bed and reports no new issues or overnight events.     PAST MEDICAL & SURGICAL HISTORY  PAST MEDICAL & SURGICAL HISTORY:  BPH (benign prostatic hyperplasia)  HLD (hyperlipidemia)  HTN (hypertension)  CAD (coronary artery disease)  History of total knee replacement  H/O hernia repair  S/P CABG (coronary artery bypass graft)    SOCIAL HISTORY:    ALLERGIES:  No Known Allergies    MEDICATIONS:  STANDING MEDICATIONS  aspirin  chewable 81 milliGRAM(s) Oral daily  azithromycin  IVPB 500 milliGRAM(s) IV Intermittent every 24 hours  carvedilol Oral Tab/Cap - Peds 12.5 milliGRAM(s) Oral two times a day  cefTRIAXone   IVPB 1000 milliGRAM(s) IV Intermittent every 24 hours  heparin  Injectable 5000 Unit(s) SubCutaneous every 8 hours  pantoprazole    Tablet 40 milliGRAM(s) Oral before breakfast  sodium chloride 0.9%. 1000 milliLiter(s) IV Continuous <Continuous>  tamsulosin 0.4 milliGRAM(s) Oral at bedtime    PRN MEDICATIONS  nitroglycerin     SubLingual 0.4 milliGRAM(s) SubLingual every 5 minutes PRN    VITALS:   T(F): 98.8  HR: 60  BP: 139/67  RR: 20  SpO2: 94%    LABS:                        10.4   13.88 )-----------( 140      ( 16 Oct 2019 05:56 )             31.8     10-16    139  |  103  |  29<H>  ----------------------------<  93  4.3   |  22  |  1.8<H>    Ca    8.5      16 Oct 2019 05:56  Mg     1.8     10-16    TPro  5.3<L>  /  Alb  3.0<L>  /  TBili  0.4  /  DBili  x   /  AST  14  /  ALT  9   /  AlkPhos  62  10-16              Culture - Blood (collected 14 Oct 2019 10:33)  Source: .Blood Blood  Preliminary Report (15 Oct 2019 17:01):    No growth to date.          RADIOLOGY:    PHYSICAL EXAM:  GENERAL: NAD, speaks in full sentences, no signs of respiratory distress  HEAD: Atraumatic  NECK: Supple  CHEST/LUNG: Clear to auscultation bilaterally; No wheeze or crackles  HEART: S1, S2; RRR; No murmurs, rubs, or gallops  ABDOMEN: BS+; Soft, Non-tender, Non-distended  EXTREMITIES:  2+ Peripheral Pulses, No clubbing, cyanosis, or edema  PSYCH: AAOx3  NEUROLOGY: non-focal  SKIN: No rashes or lesions SUBJECTIVE:    Patient is a 87y old  Male who presents with a chief complaint of PNA (14 Oct 2019 16:33)    Currently admitted to medicine with the primary diagnosis of Pneumonia     Today is hospital day 2d. Patient was seen and examined at bedside. This morning he is resting comfortably in bed and reports no new issues or overnight events. Patient endorses feeling worse this morning. He states his breathing is getting worse. Patient denies SOB, use of accessory muscles, wheezing, or pain on inspiration. Patient has a dry cough, with no sputum or bloody sputum. Patient denies chest pain, abdominal pain, nausea or vomiting     PAST MEDICAL & SURGICAL HISTORY  PAST MEDICAL & SURGICAL HISTORY:  BPH (benign prostatic hyperplasia)  HLD (hyperlipidemia)  HTN (hypertension)  CAD (coronary artery disease)  History of total knee replacement  H/O hernia repair  S/P CABG (coronary artery bypass graft)    SOCIAL HISTORY:    ALLERGIES:  No Known Allergies    MEDICATIONS:  STANDING MEDICATIONS  aspirin  chewable 81 milliGRAM(s) Oral daily  azithromycin  IVPB 500 milliGRAM(s) IV Intermittent every 24 hours  carvedilol Oral Tab/Cap - Peds 12.5 milliGRAM(s) Oral two times a day  cefTRIAXone   IVPB 1000 milliGRAM(s) IV Intermittent every 24 hours  heparin  Injectable 5000 Unit(s) SubCutaneous every 8 hours  pantoprazole    Tablet 40 milliGRAM(s) Oral before breakfast  sodium chloride 0.9%. 1000 milliLiter(s) IV Continuous <Continuous>  tamsulosin 0.4 milliGRAM(s) Oral at bedtime    PRN MEDICATIONS  nitroglycerin     SubLingual 0.4 milliGRAM(s) SubLingual every 5 minutes PRN    VITALS:   T(F): 98.8  HR: 60  BP: 139/67  RR: 20  SpO2: 94%    LABS:                        10.4   13.88 )-----------( 140      ( 16 Oct 2019 05:56 )             31.8     10-16    139  |  103  |  29<H>  ----------------------------<  93  4.3   |  22  |  1.8<H>    Ca    8.5      16 Oct 2019 05:56  Mg     1.8     10-16    TPro  5.3<L>  /  Alb  3.0<L>  /  TBili  0.4  /  DBili  x   /  AST  14  /  ALT  9   /  AlkPhos  62  10-16              Culture - Blood (collected 14 Oct 2019 10:33)  Source: .Blood Blood  Preliminary Report (15 Oct 2019 17:01):    No growth to date.          RADIOLOGY:  < from: CT Head No Cont (10.14.19 @ 14:53) >  COMPARISON:    Noncontrast CT scan of the brain dated Jacque 10, 2019.    FINDINGS:    The third and lateral ventricles are mildly enlarged as are the cortical   sulci consistent with a mild degree of cortical atrophy.  The fourth   ventricle is normal in size and position.    There is no shift of the midline structures, evidence of acute   intracranial hemorrhage, or depressed skull fracture.    Patchy foci of diminished attenuation in the periventricular white   matter. These findings are nonspecific in appearance and differential   diagnostic possibilities include ischemic change, foci of gliosis or   demyelination.    IMPRESSION:    In comparison with the prior noncontrast CT scan of the brain dated Jacque   10, 2019:    Stable examination.    No acute intracranial hemorrhage.    < end of copied text >    PHYSICAL EXAM:  GENERAL: NAD, speaks in full sentences, oxygen via nasal cannula   HEAD: Atraumatic, normocephalic  NECK: Supple, no JVD  CHEST/LUNG: Clear to auscultation bilaterally  HEART: S1, S2; RRR; No murmurs, rubs, or gallops  ABDOMEN: BS+; Soft, Non-tender, Non-distended  EXTREMITIES:  2+ Peripheral Pulses  PSYCH: AAOx3  NEUROLOGY: non-focal

## 2019-10-16 NOTE — SWALLOW BEDSIDE ASSESSMENT ADULT - SWALLOW EVAL: RECOMMENDED FEEDING/EATING TECHNIQUES
position upright (90 degrees)/oral hygiene/small sips/bites
oral hygiene/position upright (90 degrees)/small sips/bites

## 2019-10-16 NOTE — SWALLOW BEDSIDE ASSESSMENT ADULT - SLP PERTINENT HISTORY OF CURRENT PROBLEM
Pt admitted w/ fever, +R basilar opacities, productive cougg PMH: hiatal hernia, esophagitis, dementia, GERD, CABG, metabolic encephalopathy
Pt admitted w/ fever, +R basilar opacities, productive cougg PMH: hiatal hernia, esophagitis, dementia, GERD, CABG, metabolic encephalopathy

## 2019-10-16 NOTE — SWALLOW BEDSIDE ASSESSMENT ADULT - COMMENTS
Pt known to ST service from June 2019 w/ reccs for nectar, upgraded to thins, no instrumental swallow study in the past.

## 2019-10-17 ENCOUNTER — TRANSCRIPTION ENCOUNTER (OUTPATIENT)
Age: 84
End: 2019-10-17

## 2019-10-17 VITALS — OXYGEN SATURATION: 95 %

## 2019-10-17 LAB
ALBUMIN SERPL ELPH-MCNC: 3.3 G/DL — LOW (ref 3.5–5.2)
ALP SERPL-CCNC: 92 U/L — SIGNIFICANT CHANGE UP (ref 30–115)
ALT FLD-CCNC: 24 U/L — SIGNIFICANT CHANGE UP (ref 0–41)
ANION GAP SERPL CALC-SCNC: 13 MMOL/L — SIGNIFICANT CHANGE UP (ref 7–14)
AST SERPL-CCNC: 27 U/L — SIGNIFICANT CHANGE UP (ref 0–41)
BASOPHILS # BLD AUTO: 0.02 K/UL — SIGNIFICANT CHANGE UP (ref 0–0.2)
BASOPHILS NFR BLD AUTO: 0.2 % — SIGNIFICANT CHANGE UP (ref 0–1)
BILIRUB SERPL-MCNC: 0.5 MG/DL — SIGNIFICANT CHANGE UP (ref 0.2–1.2)
BUN SERPL-MCNC: 24 MG/DL — HIGH (ref 10–20)
CALCIUM SERPL-MCNC: 8.8 MG/DL — SIGNIFICANT CHANGE UP (ref 8.5–10.1)
CHLORIDE SERPL-SCNC: 104 MMOL/L — SIGNIFICANT CHANGE UP (ref 98–110)
CO2 SERPL-SCNC: 24 MMOL/L — SIGNIFICANT CHANGE UP (ref 17–32)
CREAT SERPL-MCNC: 1.6 MG/DL — HIGH (ref 0.7–1.5)
EOSINOPHIL # BLD AUTO: 0.17 K/UL — SIGNIFICANT CHANGE UP (ref 0–0.7)
EOSINOPHIL NFR BLD AUTO: 1.3 % — SIGNIFICANT CHANGE UP (ref 0–8)
GLUCOSE SERPL-MCNC: 107 MG/DL — HIGH (ref 70–99)
HCT VFR BLD CALC: 32.2 % — LOW (ref 42–52)
HGB BLD-MCNC: 10.6 G/DL — LOW (ref 14–18)
IMM GRANULOCYTES NFR BLD AUTO: 1.4 % — HIGH (ref 0.1–0.3)
LYMPHOCYTES # BLD AUTO: 1.43 K/UL — SIGNIFICANT CHANGE UP (ref 1.2–3.4)
LYMPHOCYTES # BLD AUTO: 11.1 % — LOW (ref 20.5–51.1)
MAGNESIUM SERPL-MCNC: 2 MG/DL — SIGNIFICANT CHANGE UP (ref 1.8–2.4)
MCHC RBC-ENTMCNC: 30.3 PG — SIGNIFICANT CHANGE UP (ref 27–31)
MCHC RBC-ENTMCNC: 32.9 G/DL — SIGNIFICANT CHANGE UP (ref 32–37)
MCV RBC AUTO: 92 FL — SIGNIFICANT CHANGE UP (ref 80–94)
MONOCYTES # BLD AUTO: 1 K/UL — HIGH (ref 0.1–0.6)
MONOCYTES NFR BLD AUTO: 7.7 % — SIGNIFICANT CHANGE UP (ref 1.7–9.3)
NEUTROPHILS # BLD AUTO: 10.12 K/UL — HIGH (ref 1.4–6.5)
NEUTROPHILS NFR BLD AUTO: 78.3 % — HIGH (ref 42.2–75.2)
NRBC # BLD: 0 /100 WBCS — SIGNIFICANT CHANGE UP (ref 0–0)
PLATELET # BLD AUTO: 174 K/UL — SIGNIFICANT CHANGE UP (ref 130–400)
POTASSIUM SERPL-MCNC: 4.2 MMOL/L — SIGNIFICANT CHANGE UP (ref 3.5–5)
POTASSIUM SERPL-SCNC: 4.2 MMOL/L — SIGNIFICANT CHANGE UP (ref 3.5–5)
PROT SERPL-MCNC: 5.7 G/DL — LOW (ref 6–8)
RBC # BLD: 3.5 M/UL — LOW (ref 4.7–6.1)
RBC # FLD: 15.5 % — HIGH (ref 11.5–14.5)
SODIUM SERPL-SCNC: 141 MMOL/L — SIGNIFICANT CHANGE UP (ref 135–146)
WBC # BLD: 12.92 K/UL — HIGH (ref 4.8–10.8)
WBC # FLD AUTO: 12.92 K/UL — HIGH (ref 4.8–10.8)

## 2019-10-17 PROCEDURE — 74230 X-RAY XM SWLNG FUNCJ C+: CPT | Mod: 26

## 2019-10-17 PROCEDURE — 71045 X-RAY EXAM CHEST 1 VIEW: CPT | Mod: 26

## 2019-10-17 RX ORDER — CEFPODOXIME PROXETIL 100 MG
1 TABLET ORAL
Qty: 4 | Refills: 0
Start: 2019-10-17 | End: 2019-10-20

## 2019-10-17 RX ORDER — CEFPODOXIME PROXETIL 100 MG
1 TABLET ORAL
Qty: 0 | Refills: 0 | DISCHARGE

## 2019-10-17 RX ADMIN — CARVEDILOL PHOSPHATE 12.5 MILLIGRAM(S): 80 CAPSULE, EXTENDED RELEASE ORAL at 05:17

## 2019-10-17 RX ADMIN — Medication 81 MILLIGRAM(S): at 13:19

## 2019-10-17 RX ADMIN — PANTOPRAZOLE SODIUM 40 MILLIGRAM(S): 20 TABLET, DELAYED RELEASE ORAL at 05:18

## 2019-10-17 RX ADMIN — HEPARIN SODIUM 5000 UNIT(S): 5000 INJECTION INTRAVENOUS; SUBCUTANEOUS at 13:19

## 2019-10-17 NOTE — PROGRESS NOTE ADULT - SUBJECTIVE AND OBJECTIVE BOX
JAECALLI OBREGON  87y, Male  Allergy: No Known Allergies      LAST 24-Hr EVENTS:  cough improved  no shortness of breath    VITALS:  T(F): 99.2 (10-17-19 @ 04:55), Max: 99.2 (10-17-19 @ 04:55)  HR: 61 (10-17-19 @ 04:55)  BP: 167/78 (10-17-19 @ 04:55) (119/66 - 167/78)  RR: 20 (10-17-19 @ 04:55)  SpO2: 95% (10-17-19 @ 08:45)    PHYSICAL EXAM:    GENERAL: NAD  NECK: Supple, No JVD  CHEST/LUNG: CTA b/l  HEART: Regular rate and rhythm  ABDOMEN: Soft, Nontender, Nondistended  EXTREMITIES:  No clubbing, edema absent        TESTS & MEASUREMENTS:    IN: 0 mL / OUT: 300 mL / NET: -300 mL                            10.6   12.92 )-----------( 174      ( 17 Oct 2019 07:55 )             32.2       10-17    141  |  104  |  24<H>  ----------------------------<  107<H>  4.2   |  24  |  1.6<H>    Ca    8.8      17 Oct 2019 07:55  Mg     2.0     10-17    TPro  5.7<L>  /  Alb  3.3<L>  /  TBili  0.5  /  DBili  x   /  AST  27  /  ALT  24  /  AlkPhos  92  10-17    LIVER FUNCTIONS - ( 17 Oct 2019 07:55 )  Alb: 3.3 g/dL / Pro: 5.7 g/dL / ALK PHOS: 92 U/L / ALT: 24 U/L / AST: 27 U/L / GGT: x                 Culture - Blood (collected 10-14-19 @ 10:33)  Source: .Blood Blood  Preliminary Report (10-15-19 @ 17:01):    No growth to date.        MEDICATIONS:  MEDICATIONS  (STANDING):  aspirin  chewable 81 milliGRAM(s) Oral daily  carvedilol Oral Tab/Cap - Peds 12.5 milliGRAM(s) Oral two times a day  cefTRIAXone   IVPB 1000 milliGRAM(s) IV Intermittent every 24 hours  heparin  Injectable 5000 Unit(s) SubCutaneous every 8 hours  pantoprazole    Tablet 40 milliGRAM(s) Oral before breakfast  tamsulosin 0.4 milliGRAM(s) Oral at bedtime    MEDICATIONS  (PRN):  nitroglycerin     SubLingual 0.4 milliGRAM(s) SubLingual every 5 minutes PRN Chest Pain

## 2019-10-17 NOTE — DISCHARGE NOTE PROVIDER - INSTRUCTIONS
-Dysphagia 2 (mechsoft) and thin liquids  - eat @ 90 degrees and remain upright at meals 30-60 minutes  - alternate bite/ sip  - consecutive sips of liquids

## 2019-10-17 NOTE — DISCHARGE NOTE PROVIDER - CARE PROVIDER_API CALL
Aroldo Pitts)  Geriatric Medicine; Internal Medicine  [address] [place] [address]  Phone: [phone]  Fax: [phone]  Follow Up Time:

## 2019-10-17 NOTE — CDI QUERY NOTE - NSCDIOTHERTXTBX2_GEN_ALL_CORE_FT
Pneumonia diagnosis need further specificity    Community acquired Pneumonia merely site where pneumonia was acquired.       For accurate coding assignment, Please specify type of Pneumonia    > Atypical Pneumonia  > Gm Neg Pneumonia  > Gm Positive Pneumonia  > Other, Type of Pneumonia  > Unknown/clinically unable to determine

## 2019-10-17 NOTE — DISCHARGE NOTE NURSING/CASE MANAGEMENT/SOCIAL WORK - PATIENT PORTAL LINK FT
You can access the FollowMyHealth Patient Portal offered by [facility] by registering at the following website: [email or web address]. By joining [facility]’s FollowMyHealth portal, you will also be able to view your health information using other applications (apps) compatible with our system.

## 2019-10-17 NOTE — CONSULT NOTE ADULT - ASSESSMENT
ASSESSMENT  88 yo M with PMH CAD s/p CABG, hyperlipidemia, GERD, BPH, and dementia (fully functional) presents with 2 day history of chills, productive cough of sputum, weakness, and decreased PO intake.    IMPRESSION  #RLL PNA    fever on admission, Sepsis ruled out on admission     CXR Ill-defined right base opacities and prominent interstitial opacities    BCX NG    Flu/RSV neg    RECOMMENDATIONS  - send strep urine ag  - Ceftriaxone 1g q24h IV  - D/C azithro as complete 3 days of 500mg   - When stable, D/C on PO vantin 200mg daily to complete 7 days crcl <30 end 10/20    Spectra [id number] ASSESSMENT  86 yo M with PMH CAD s/p CABG, hyperlipidemia, GERD, BPH, and dementia (fully functional) presents with 2 day history of chills, productive cough of sputum, weakness, and decreased PO intake.    IMPRESSION  #RLL PNA, CAP    fever on admission, Sepsis ruled out on admission     CXR Ill-defined right base opacities and prominent interstitial opacities    BCX NG    Flu/RSV neg  #Metabolic encephalopathy    RECOMMENDATIONS  - send strep urine ag, trend WBC  - Ceftriaxone 1g q24h IV  - D/C azithro as completed 3 days of 500mg IV  - When stable, D/C on PO vantin 200mg daily to complete 7 days crcl <30 end 10/20    Spectra [id number]

## 2019-10-17 NOTE — SWALLOW VFSS/MBS ASSESSMENT ADULT - ESOPHAGEAL STAGE
resistance to bolus flow in the upper esophagus, significant esophageal residue at the clavicular level, esophageal backflow, not above PES during this study. Residue did not clear with a single sip liquid wash, +improved clearance of residue w/ use of consecutive sips of thin Resistance to bolus flow in the upper esophagus, significant esophageal residue at the clavicular level, esophageal backflow, not above PES during this study. Residue did not clear with a single sip liquid wash, +improved clearance of residue w/ use of consecutive sips of thin Consecutive sips helped clear puree/soft solid residue in the esophagus at the clavicular region

## 2019-10-17 NOTE — SWALLOW VFSS/MBS ASSESSMENT ADULT - PHARYNGEAL PHASE COMMENTS
Mild pharyngeal dysphagia for thin w/o aspiration WFL for nectar WFL for puree Mild pharyngeal dysphagia for soft solids

## 2019-10-17 NOTE — SWALLOW VFSS/MBS ASSESSMENT ADULT - DIAGNOSTIC IMPRESSIONS
Mild pharyngeal dysphagia for thin and soft solids, pharyngeal dysphagia is WFL for nectar and puree. +esophageal residue noted w/ puree and soft which improves w/ consecutive sips/liquid wash

## 2019-10-17 NOTE — SWALLOW VFSS/MBS ASSESSMENT ADULT - MODE OF PRESENTATION
spoon/self fed/fed by clinician/cup spoon/fed by clinician fed by clinician straw/self fed/spoon/fed by clinician/cup

## 2019-10-17 NOTE — PROGRESS NOTE ADULT - PROVIDER SPECIALTY LIST ADULT
Internal Medicine
Pulmonology
Pulmonology

## 2019-10-17 NOTE — SWALLOW VFSS/MBS ASSESSMENT ADULT - SLP PERTINENT HISTORY OF CURRENT PROBLEM
Pt admitted w/ fever, +R basilar opacities, productive cough PMH: hiatal hernia, esophagitis, dementia, GERD, CABG, metabolic encephalopathy

## 2019-10-17 NOTE — CONSULT NOTE ADULT - SUBJECTIVE AND OBJECTIVE BOX
CHICHO CALLI  87y, Male  Allergy: No Known Allergies      CHIEF COMPLAINT: PNA (14 Oct 2019 16:33)      HPI:  86 yo M with PMH CAD s/p CABG, hyperlipidemia, GERD, BPH, and dementia (fully functional) presents with 2 day history of chills, productive cough of sputum, weakness, and decreased PO intake. Per son and girlfriend at bedside, the patient also has a change in mental status. Previously the patient was fully functional at home with ADL and now he has difficulty getting out of bed and is somnolent. Denies CP/SOB, denies abdominal pain, urinary frequency/urgency/discomfort, denies rashes.     Note: history obtained from family due to patient's AMS/sommnolence    ED: vital signs T 101.1, bp 152/62, HR 76, RR 18, 92% on RA, WBC 9.70, lactate 1.6   XRAY Chest: right base opacity and interstitial opacities  s/p 1 dose of Azithromycin and Rocephin (14 Oct 2019 15:41)      INFECTIOUS DISEASE HISTORY:  fever on admission to 101    PAST MEDICAL & SURGICAL HISTORY:  BPH (benign prostatic hyperplasia)  HLD (hyperlipidemia)  HTN (hypertension)  CAD (coronary artery disease)  History of total knee replacement  H/O hernia repair  S/P CABG (coronary artery bypass graft)      FAMILY HISTORY  non-contributory     SOCIAL HISTORY  Social History (marital status, living situation, occupation, tobacco use, alcohol and drug use, and sexual history): Lives at home with son, has girlfriend; does not drink/smoke/use illicit drugs    ROS  General: Denies rigors, nightsweats  HEENT: Denies headache, rhinorrhea, sore throat, eye pain  CV: Denies CP, palpitations  PULM: as noted above   GI: Denies hematemesis, hematochezia, melena  : Denies discharge, hematuria  MSK: Denies arthralgias, myalgias  SKIN: Denies rash, lesions  NEURO: Denies paresthesias, weakness  PSYCH: Denies depression, anxiety    VITALS:  T(F): 99.2, Max: 99.2 (10-17-19 @ 04:55)  HR: 61  BP: 167/78  RR: 20Vital Signs Last 24 Hrs  T(C): 37.3 (17 Oct 2019 04:55), Max: 37.3 (17 Oct 2019 04:55)  T(F): 99.2 (17 Oct 2019 04:55), Max: 99.2 (17 Oct 2019 04:55)  HR: 61 (17 Oct 2019 04:55) (58 - 61)  BP: 167/78 (17 Oct 2019 04:55) (119/66 - 167/78)  BP(mean): --  RR: 20 (17 Oct 2019 04:55) (18 - 20)  SpO2: --    PHYSICAL EXAM:  Gen: Elderly M NAD, resting in bed  HEENT: Normocephalic, atraumatic  Neck: supple, no lymphadenopathy  CV: Regular rate & regular rhythm  Lungs: decreased BS RLL  Abdomen: Soft, BS present  Ext: Warm, well perfused  Neuro: non focal, awake  Skin: no rash, no erythema  Lines: no phlebitis    TESTS & MEASUREMENTS:                        10.4   13.88 )-----------( 140      ( 16 Oct 2019 05:56 )             31.8     10-16    139  |  103  |  29<H>  ----------------------------<  93  4.3   |  22  |  1.8<H>    Ca    8.5      16 Oct 2019 05:56  Mg     1.8     10-16    TPro  5.3<L>  /  Alb  3.0<L>  /  TBili  0.4  /  DBili  x   /  AST  14  /  ALT  9   /  AlkPhos  62  10-16      LIVER FUNCTIONS - ( 16 Oct 2019 05:56 )  Alb: 3.0 g/dL / Pro: 5.3 g/dL / ALK PHOS: 62 U/L / ALT: 9 U/L / AST: 14 U/L / GGT: x               Culture - Blood (collected 10-14-19 @ 10:33)  Source: .Blood Blood  Preliminary Report (10-15-19 @ 17:01):    No growth to date.        Lactate, Blood: 1.6 mmol/L (10-14-19 @ 14:50)      INFECTIOUS DISEASES TESTING      RADIOLOGY & ADDITIONAL TESTS:  I have personally reviewed the last Chest xray  CXR  Xray Chest 1 View AP/PA:   EXAM:  XR CHEST FRONTAL 1V            PROCEDURE DATE:  10/14/2019            INTERPRETATION:  Clinical History / Reason for exam: Chest pain, CABG    Comparison : Chest radiograph 6/9/2019.    Technique/Positioning: Single AP view.    Findings:    Support devices: None.    Cardiac/mediastinum/hilum: Median sternotomy.    Lung parenchyma/Pleura: There are mildly prominent interstitial markings.  Ill-defined right base opacity. No pneumothorax    Skeleton/soft tissues: Stable.    Impression:      Ill-defined right base opacities and prominent interstitial opacities.   Follow-up to resolution is recommended.                  JOSUE TRUJILLO M.D., ATTENDING RADIOLOGIST  This document has been electronically signed. Oct 14 2019  1:28PM             (10-14-19 @ 11:59)      CT      CARDIOLOGY TESTING  12 Lead ECG:   Ventricular Rate 81 BPM    Atrial Rate 81 BPM    P-R Interval 188 ms    QRS Duration 76 ms    Q-T Interval 362 ms    QTC Calculation(Bezet) 420 ms    P Axis 48 degrees    R Axis 69 degrees    T Axis 43 degrees    Diagnosis Line Normal sinus rhythm  Normal ECG    Confirmed by BIRD ESTEVES MD ([id number]) on 10/14/2019 12:43:57 PM (10-14-19 @ 11:38)      MEDICATIONS  aspirin  chewable 81  azithromycin  IVPB 500  carvedilol Oral Tab/Cap - Peds 12.5  cefTRIAXone   IVPB 1000  heparin  Injectable 5000  pantoprazole    Tablet 40  tamsulosin 0.4      ANTIBIOTICS:  azithromycin  IVPB 500 milliGRAM(s) IV Intermittent every 24 hours  cefTRIAXone   IVPB 1000 milliGRAM(s) IV Intermittent every 24 hours      ALLERGIES:  No Known Allergies JAECALLI OBREGON  87y, Male  Allergy: No Known Allergies      CHIEF COMPLAINT: PNA (14 Oct 2019 16:33)      HPI:  88 yo M with PMH CAD s/p CABG, hyperlipidemia, GERD, BPH, and dementia (fully functional) presents with 2 day history of chills, productive cough of sputum, weakness, and decreased PO intake. Per son and girlfriend at bedside, the patient also has a change in mental status. Previously the patient was fully functional at home with ADL and now he has difficulty getting out of bed and is somnolent. Denies CP/SOB, denies abdominal pain, urinary frequency/urgency/discomfort, denies rashes.     Note: history obtained from family due to patient's AMS/sommnolence    ED: vital signs T 101.1, bp 152/62, HR 76, RR 18, 92% on RA, WBC 9.70, lactate 1.6   XRAY Chest: right base opacity and interstitial opacities  s/p 1 dose of Azithromycin and Rocephin (14 Oct 2019 15:41)      INFECTIOUS DISEASE HISTORY:  fever on admission to 101  pt currently confused but able to answer questions. States he has a cough, but not bringing up much phlegm. No diarrhea. No more f/c.     PAST MEDICAL & SURGICAL HISTORY:  BPH (benign prostatic hyperplasia)  HLD (hyperlipidemia)  HTN (hypertension)  CAD (coronary artery disease)  History of total knee replacement  H/O hernia repair  S/P CABG (coronary artery bypass graft)      FAMILY HISTORY  non-contributory     SOCIAL HISTORY  Social History (marital status, living situation, occupation, tobacco use, alcohol and drug use, and sexual history): Lives at home with son, has girlfriend; does not drink/smoke/use illicit drugs    ROS  General: Denies rigors, nightsweats  HEENT: Denies headache, rhinorrhea, sore throat, eye pain  CV: Denies CP, palpitations  PULM: as noted above   GI: Denies hematemesis, hematochezia, melena  : Denies discharge, hematuria  MSK: Denies arthralgias, myalgias  SKIN: Denies rash, lesions  NEURO: Denies paresthesias, weakness  PSYCH: Denies depression, anxiety    VITALS:  T(F): 99.2, Max: 99.2 (10-17-19 @ 04:55)  HR: 61  BP: 167/78  RR: 20Vital Signs Last 24 Hrs  T(C): 37.3 (17 Oct 2019 04:55), Max: 37.3 (17 Oct 2019 04:55)  T(F): 99.2 (17 Oct 2019 04:55), Max: 99.2 (17 Oct 2019 04:55)  HR: 61 (17 Oct 2019 04:55) (58 - 61)  BP: 167/78 (17 Oct 2019 04:55) (119/66 - 167/78)  BP(mean): --  RR: 20 (17 Oct 2019 04:55) (18 - 20)  SpO2: --    PHYSICAL EXAM:  Gen: Elderly M NAD, resting in bed  HEENT: Normocephalic, atraumatic  Neck: supple, no lymphadenopathy  CV: Regular rate & regular rhythm  Lungs: coarse BS RLL  Abdomen: Soft, BS present  Ext: Warm, well perfused  Neuro: non focal, awake, able to state name, , cannot state location or year, when asked President states "starts with a T"  Skin: no rash, no erythema  Lines: no phlebitis    TESTS & MEASUREMENTS:                        10.4   13.88 )-----------( 140      ( 16 Oct 2019 05:56 )             31.8     10-    139  |  103  |  29<H>  ----------------------------<  93  4.3   |  22  |  1.8<H>    Ca    8.5      16 Oct 2019 05:56  Mg     1.8     10-    TPro  5.3<L>  /  Alb  3.0<L>  /  TBili  0.4  /  DBili  x   /  AST  14  /  ALT  9   /  AlkPhos  62  10-16      LIVER FUNCTIONS - ( 16 Oct 2019 05:56 )  Alb: 3.0 g/dL / Pro: 5.3 g/dL / ALK PHOS: 62 U/L / ALT: 9 U/L / AST: 14 U/L / GGT: x               Culture - Blood (collected 10-14-19 @ 10:33)  Source: .Blood Blood  Preliminary Report (10-15-19 @ 17:01):    No growth to date.        Lactate, Blood: 1.6 mmol/L (10-14-19 @ 14:50)      INFECTIOUS DISEASES TESTING      RADIOLOGY & ADDITIONAL TESTS:  I have personally reviewed the last Chest xray  CXR  Xray Chest 1 View AP/PA:   EXAM:  XR CHEST FRONTAL 1V            PROCEDURE DATE:  10/14/2019            INTERPRETATION:  Clinical History / Reason for exam: Chest pain, CABG    Comparison : Chest radiograph 2019.    Technique/Positioning: Single AP view.    Findings:    Support devices: None.    Cardiac/mediastinum/hilum: Median sternotomy.    Lung parenchyma/Pleura: There are mildly prominent interstitial markings.  Ill-defined right base opacity. No pneumothorax    Skeleton/soft tissues: Stable.    Impression:      Ill-defined right base opacities and prominent interstitial opacities.   Follow-up to resolution is recommended.                  JOSUE TRUJILLO M.D., ATTENDING RADIOLOGIST  This document has been electronically signed. Oct 14 2019  1:28PM             (10-14-19 @ 11:59)      CT      CARDIOLOGY TESTING  12 Lead ECG:   Ventricular Rate 81 BPM    Atrial Rate 81 BPM    P-R Interval 188 ms    QRS Duration 76 ms    Q-T Interval 362 ms    QTC Calculation(Bezet) 420 ms    P Axis 48 degrees    R Axis 69 degrees    T Axis 43 degrees    Diagnosis Line Normal sinus rhythm  Normal ECG    Confirmed by BIRD ESTEVES MD ([id number]) on 10/14/2019 12:43:57 PM (10-14-19 @ 11:38)      MEDICATIONS  aspirin  chewable 81  azithromycin  IVPB 500  carvedilol Oral Tab/Cap - Peds 12.5  cefTRIAXone   IVPB 1000  heparin  Injectable 5000  pantoprazole    Tablet 40  tamsulosin 0.4      ANTIBIOTICS:  azithromycin  IVPB 500 milliGRAM(s) IV Intermittent every 24 hours  cefTRIAXone   IVPB 1000 milliGRAM(s) IV Intermittent every 24 hours      ALLERGIES:  No Known Allergies

## 2019-10-17 NOTE — CDI QUERY NOTE - NSCDIOTHERTXTBX_GEN_ALL_CORE_HH
10/14< 88 yo male with a history of CAD s/p CABG, GERD, HLD, BPH, and dementia (fully functional), melanoma presenting with generalized weakness, 2 day history of chills, productive cough of sputum, weakness, and decreased PO intake     WBC 9.70> 13.16, 13.88, 12.92 auto neutrophil% 80.5  81.7     H and P Reflect Admitted for  Community Acquired Pneumonia ( RLL), Anemia of chronic disease, BPH, < AMS-  likely secondary to metabolic encephalopathy, CKD - at baseline- Cr 1.8     -  T 101.1 , HR 76, bp 152/62, lactate 1.6, Rocephin & Azithromycin      > Acute hypoxic respiratory failure , requiring oxygen via nasal cannula (2L) sat 94%    10/16> acute on CKD-resolved    10/16>Pulmonologist PN reflect>  pneumonia, sepsis, dysphagia    In order to capture the severity of illness as a provider for this patient., Sepsis diagnosis need clarification such as     ?	Agree with sepsis diagnosis ( Please document additional sepsis criteria)  ?	Pt without sepsis  ?	Other, please specify  ?	Unknown/clinically unable to determine

## 2019-10-17 NOTE — PROGRESS NOTE ADULT - SUBJECTIVE AND OBJECTIVE BOX
CALLI VALENTINO  87y  Male      SUBJECTIVE:  c/o has cough,not sob    Progress Note:      REVIEW OF SYSTEMS:    T(C): 37.3 (10-17-19 @ 04:55), Max: 37.3 (10-17-19 @ 04:55)  HR: 61 (10-17-19 @ 04:55) (58 - 61)  BP: 167/78 (10-17-19 @ 04:55) (119/66 - 167/78)  RR: 20 (10-17-19 @ 04:55) (18 - 20)  SpO2: --  Wt(kg): --Vital Signs Last 24 Hrs  T(C): 37.3 (17 Oct 2019 04:55), Max: 37.3 (17 Oct 2019 04:55)  T(F): 99.2 (17 Oct 2019 04:55), Max: 99.2 (17 Oct 2019 04:55)  HR: 61 (17 Oct 2019 04:55) (58 - 61)  BP: 167/78 (17 Oct 2019 04:55) (119/66 - 167/78)  BP(mean): --  RR: 20 (17 Oct 2019 04:55) (18 - 20)  SpO2: --    PHYSICAL EXAM:  lungs-clear  cor reg,nl s1 &s2  neuro-alert but confused to place  LABS:                        10.4   13.88 )-----------( 140      ( 16 Oct 2019 05:56 )             31.8     10-16    139  |  103  |  29<H>  ----------------------------<  93  4.3   |  22  |  1.8<H>    Ca    8.5      16 Oct 2019 05:56  Mg     1.8     10-16    TPro  5.3<L>  /  Alb  3.0<L>  /  TBili  0.4  /  DBili  x   /  AST  14  /  ALT  9   /  AlkPhos  62  10-16    RADIOLOGY:      IMPRESSION:  RLL pneumonia  CKD-at baseline  anemia-   mild cognitive impairment   right TKR  s/p melanoma 2006  cad/cabg -stable  bph  htn-stable    PLAN:  repeat CXR today  iv antibiotics  dvt prophylaxis  recheck cbc/bmp/fe/tibc/b12/folate  rehab/pt  ID consult    I SPENT 30  MINUTES IN TOTAL EXAMINING,COUNSELING PATIENT AND COORDINATING CARE

## 2019-10-17 NOTE — DISCHARGE NOTE PROVIDER - HOSPITAL COURSE
88 yo M with PMH CAD s/p CABG, hyperlipidemia, GERD, BPH, and dementia (fully functional) presents with 2 day history of chills, productive cough of sputum, weakness, and decreased PO intake. Per son and girlfriend at bedside, the patient also has a change in mental status. Previously the patient was fully functional at home with ADL and now he has difficulty getting out of bed and is somnolent. Denies CP/SOB, denies abdominal pain, urinary frequency/urgency/discomfort, denies rashes.     Patient was admited for sepsis secondary to Community Acquired Pneumonia. Curb 65 score of 3 upon admission. A chest x ray showed right base opacity. Patient was started on Azithromycin and Rocephinin in ED. Sputum and blood cultures were negative. Pulmonary was consulted. Sepsis resolved. ID was consulted which recommended PO Vantin on discharge. Patient is medically stable and ready for discharge.

## 2019-10-17 NOTE — SWALLOW VFSS/MBS ASSESSMENT ADULT - ORAL PHASE
within functional limits weak BOT propulsion, piecemeal deglutition over epiglottis w/ larger sips, reduced BOT propulsion/Uncontrolled bolus / spillover in hypopharynx

## 2019-10-17 NOTE — PROGRESS NOTE ADULT - ASSESSMENT
pneumonia  sepsis  dysphagia      oxygen per pulse oximetry  antibiotics  monitor temperature and wbc  repeat chest x-ray  aspiration precautions/dysphagia diet  out of bed  gi/dvt prophylaxis
86 yo M with PMH CAD s/p CABG, hyperlipidemia, GERD, BPH, and dementia (fully functional) presents with 2 day history of chills, productive cough of sputum, weakness, and decreased PO intake. Per son and girlfriend at bedside, the patient also has a change in mental status. Previously the patient was fully functional at home with ADL and now he has difficulty getting out of bed and is somnolent. Denies CP/SOB, denies abdominal pain, urinary frequency/urgency/discomfort, denies rashes.     # Community Acquired Pneumonia  -  T 101.1 , HR 76, bp 152/62, lactate 1.6  -  qsofa sepsis curb-65 score is 3 (confusion, elevated BUN, and age)  -  fever, productive cough, ams   - (-) flu, (-) RSV  - CXR : right base opacity  - s/p Azithromycin and Rocephin in ED  - c/w Rocephin & Azithromycin   - f/u sputum and blood cultures   - f/u Legionella and strep antigen   - pulmonary consult appreciated   - Monitor I + Os, daily weights       # AMS  -  likely secondary to metabolic encephalopathy  -  CT head negative for acute process     # CAD s/p CABG   - c/w aspirin, carvedilol, simvastatin    # BPH  - c/w flomax 0.4 mg at bedtime    # CKD - at baseline  - Cr 1.8     # Anemia of chronic disease  - Baseline Hb 12 , current 11.2   - Fe studies, Vitamin B 12 , Folate      # Diet: DASH / TLC  # Activity: out of bed to chair with assistance (walker)  # DVT PPX: Heparin subq  # GI PPX: pantoprazole 40 mg before breakfast
Pneumonia  Dementia  CKD    agree with oral abx  aspiration precautions  albuterol nebs prn  dvt/gi px  stable from the pulmonary standpoint
86 yo M with PMH CAD s/p CABG, hyperlipidemia, GERD, BPH, and dementia (fully functional) presents with 2 day history of chills, productive cough of sputum, weakness, and decreased PO intake. Per son and girlfriend at bedside, the patient also has a change in mental status. Previously the patient was fully functional at home with ADL and now he has difficulty getting out of bed and is somnolent. Denies CP/SOB, denies abdominal pain, urinary frequency/urgency/discomfort, denies rashes.     # Community Acquired Pneumonia  -  T 101.1 , HR 76, bp 152/62, lactate 1.6  -  qsofa sepsis curb-65 score is 3 (confusion, elevated BUN, and age)  -  fever, productive cough, ams   - (-) flu, (-) RSV  - CXR : right base opacity  - s/p Azithromycin and Rocephin in ED  - c/w Rocephin & Azithromycin   - Sepsis resolved  - f/u sputum and blood cultures   - f/u Legionella and strep antigen   - pulmonary consult appreciated   - Monitor I + Os, daily weights   - F/U repeat CXR  - possible discharge to SNF    #Acute hypoxic respiratory failure   - admitted for CAP  - requiring oxygen via nasal cannula (2L)  - still desaturating when off oxygen     # AMS  -  likely secondary to metabolic encephalopathy  -  CT head negative for acute process     # CAD s/p CABG   - c/w aspirin, carvedilol, simvastatin    # BPH  - c/w flomax 0.4 mg at bedtime    # CKD - at baseline  - Cr 1.8     # Anemia of chronic disease  - Baseline Hb 12 , current 11.2   - Fe studies, Vitamin B 12 , Folate      # Diet: DASH / TLC  # Activity: out of bed to chair with assistance (walker)  # DVT PPX: Heparin subq  # GI PPX: pantoprazole 40 mg before breakfast

## 2019-10-17 NOTE — CHART NOTE - NSCHARTNOTEFT_GEN_A_CORE
<<<RESIDENT DISCHARGE NOTE>>>     CALLI VALENTINO  MRN-[id number]    VITAL SIGNS:  T(F): 99.2 (10-17-19 @ 04:55), Max: 99.2 (10-17-19 @ 04:55)  HR: 61 (10-17-19 @ 04:55)  BP: 167/78 (10-17-19 @ 04:55)  SpO2: 95% (10-17-19 @ 08:45)      PHYSICAL EXAMINATION:  General: NAD  Head & Neck: no JVD  Pulmonary: equal breath sounds bilateral  Cardiovascular: S1S2, RRR  Gastrointestinal/Abdomen & Pelvis: BS (+), non tender  Neurologic/Motor: non focal     TEST RESULTS:                        10.6   12.92 )-----------( 174      ( 17 Oct 2019 07:55 )             32.2       10-17    141  |  104  |  24<H>  ----------------------------<  107<H>  4.2   |  24  |  1.6<H>    Ca    8.8      17 Oct 2019 07:55  Mg     2.0     10-17    TPro  5.7<L>  /  Alb  3.3<L>  /  TBili  0.5  /  DBili  x   /  AST  27  /  ALT  24  /  AlkPhos  92  10-17      FINAL DISCHARGE INTERVIEW:  Resident(s) Present: (Name:______Bin Olivo _______), RN Present: (Name:  ______Dominique_____)    DISCHARGE MEDICATION RECONCILIATION  reviewed with Attending (Name:_____Dr. Pitts______)    DISPOSITION:  SNF/ NH,

## 2019-10-17 NOTE — DISCHARGE NOTE PROVIDER - NSDCCPCAREPLAN_GEN_ALL_CORE_FT
PRINCIPAL DISCHARGE DIAGNOSIS  Diagnosis: Sepsis due to pneumonia  Assessment and Plan of Treatment: You were admitted to the hospital after presenting to the emergency department with altered mental status and a productive cough. You were found to have sepsis secondary to pneumonia. We had did a chest x ray which showed the infection in your lung . We treated you with IV antibiotics. We will discharge you with oral antibiotics, please take them as perscribed. Please follow up with your primary care doctor within the next week.

## 2019-10-19 LAB
CULTURE RESULTS: SIGNIFICANT CHANGE UP
SPECIMEN SOURCE: SIGNIFICANT CHANGE UP

## 2019-10-22 DIAGNOSIS — J96.01 ACUTE RESPIRATORY FAILURE WITH HYPOXIA: ICD-10-CM

## 2019-10-22 DIAGNOSIS — D63.8 ANEMIA IN OTHER CHRONIC DISEASES CLASSIFIED ELSEWHERE: ICD-10-CM

## 2019-10-22 DIAGNOSIS — N17.9 ACUTE KIDNEY FAILURE, UNSPECIFIED: ICD-10-CM

## 2019-10-22 DIAGNOSIS — Z95.1 PRESENCE OF AORTOCORONARY BYPASS GRAFT: ICD-10-CM

## 2019-10-22 DIAGNOSIS — A41.9 SEPSIS, UNSPECIFIED ORGANISM: ICD-10-CM

## 2019-10-22 DIAGNOSIS — N18.9 CHRONIC KIDNEY DISEASE, UNSPECIFIED: ICD-10-CM

## 2019-10-22 DIAGNOSIS — Z85.820 PERSONAL HISTORY OF MALIGNANT MELANOMA OF SKIN: ICD-10-CM

## 2019-10-22 DIAGNOSIS — R63.8 OTHER SYMPTOMS AND SIGNS CONCERNING FOOD AND FLUID INTAKE: ICD-10-CM

## 2019-10-22 DIAGNOSIS — N40.0 BENIGN PROSTATIC HYPERPLASIA WITHOUT LOWER URINARY TRACT SYMPTOMS: ICD-10-CM

## 2019-10-22 DIAGNOSIS — I12.9 HYPERTENSIVE CHRONIC KIDNEY DISEASE WITH STAGE 1 THROUGH STAGE 4 CHRONIC KIDNEY DISEASE, OR UNSPECIFIED CHRONIC KIDNEY DISEASE: ICD-10-CM

## 2019-10-22 DIAGNOSIS — K21.9 GASTRO-ESOPHAGEAL REFLUX DISEASE WITHOUT ESOPHAGITIS: ICD-10-CM

## 2019-10-22 DIAGNOSIS — Z87.891 PERSONAL HISTORY OF NICOTINE DEPENDENCE: ICD-10-CM

## 2019-10-22 DIAGNOSIS — F03.90 UNSPECIFIED DEMENTIA WITHOUT BEHAVIORAL DISTURBANCE: ICD-10-CM

## 2019-10-22 DIAGNOSIS — J18.9 PNEUMONIA, UNSPECIFIED ORGANISM: ICD-10-CM

## 2019-10-22 DIAGNOSIS — R53.1 WEAKNESS: ICD-10-CM

## 2019-10-22 DIAGNOSIS — E78.5 HYPERLIPIDEMIA, UNSPECIFIED: ICD-10-CM

## 2019-10-22 DIAGNOSIS — G93.41 METABOLIC ENCEPHALOPATHY: ICD-10-CM

## 2020-02-18 NOTE — ED ADULT TRIAGE NOTE - WEIGHT IN LBS
Opioid Pregnancy And Lactation Text: These medications can lead to premature delivery and should be avoided during pregnancy. These medications are also present in breast milk in small amounts. 160

## 2020-03-20 NOTE — ED PROVIDER NOTE - CONDUCTED A DETAILED DISCUSSION WITH PATIENT AND/OR GUARDIAN REGARDING, MDM
oral need for outpatient follow-up/radiology results/lab results/return to ED if symptoms worsen, persist or questions arise

## 2020-05-27 ENCOUNTER — APPOINTMENT (OUTPATIENT)
Dept: UROLOGY | Facility: CLINIC | Age: 85
End: 2020-05-27
Payer: MEDICARE

## 2020-05-27 DIAGNOSIS — N13.8 BENIGN PROSTATIC HYPERPLASIA WITH LOWER URINARY TRACT SYMPMS: ICD-10-CM

## 2020-05-27 DIAGNOSIS — N40.1 BENIGN PROSTATIC HYPERPLASIA WITH LOWER URINARY TRACT SYMPMS: ICD-10-CM

## 2020-05-27 PROCEDURE — 99213 OFFICE O/P EST LOW 20 MIN: CPT

## 2020-05-27 NOTE — HISTORY OF PRESENT ILLNESS
[FreeTextEntry1] : 87-year-old with BPH and lower urinary tract symptoms on rapid flow 4 mg daily.  He urinates with good urinary flow, nocturia x1, no daytime urgency or frequency, no flank pain and no sensation of incomplete voiding.

## 2020-05-27 NOTE — PHYSICAL EXAM
[General Appearance - In No Acute Distress] : no acute distress [Prostate Tenderness] : the prostate was not tender [No Prostate Nodules] : no prostate nodules [Prostate Size ___ (0-4)] : prostate size [unfilled] (scale: 0-4) [] : no respiratory distress [Oriented To Time, Place, And Person] : oriented to person, place, and time [Normal Station and Gait] : the gait and station were normal for the patient's age

## 2020-05-27 NOTE — REVIEW OF SYSTEMS
[Feeling Poorly] : not feeling poorly [Cough] : no cough [Chest Pain] : no chest pain [Constipation] : no constipation [Confused] : no confusion [Dysuria] : no dysuria

## 2021-11-29 ENCOUNTER — RX RENEWAL (OUTPATIENT)
Age: 86
End: 2021-11-29

## 2022-01-06 NOTE — H&P ADULT - NSHPLABSRESULTS_GEN_ALL_CORE
Jose Massey Jr. is a 54 year old male presenting for   Chief Complaint   Patient presents with   • Office Visit   • Ankle Pain     right ankle pain from boots,      Denies Latex allergy or sensitivity.    Medication verified, no changes  Refills needed today: No    Health Maintenance Due   Topic Date Due   • Shingles Vaccine (1 of 2) Never done   • Influenza Vaccine (1) 09/01/2021   • COVID-19 Vaccine (3 - Booster for Pfizer series) 12/07/2021   • Depression Screening  05/13/2022       Patient is due for topics as listed above but is not proceeding with Immunization(s) Shingles at this time. Orders placed for Immunization(s) COVID-19 and Influenza.          Last lab results:   No results found for: HGBA1C  Cholesterol (mg/dL)   Date Value   05/13/2021 259 (H)     HDL (mg/dL)   Date Value   05/13/2021 49     Triglycerides (mg/dL)   Date Value   05/13/2021 134     LDL (mg/dL)   Date Value   05/13/2021 183 (H)     No results found for: URMIC, UCR, MALBCR  Lab Results   Component Value Date/Time    IFOB POSITIVE (A) 04/02/2019 10:29 AM                 Depression Screening:  Recent Review Flowsheet Data     Date 5/13/2021    Adult PHQ 2 Score 0    Adult PHQ 2 Interpretation No further screening needed    Little interest or pleasure in activity? 0    Feeling down, depressed or hopeless? 0           Advance Directives:  not discussed    
Vaccine Information Statement(s) or the Emergency Use Authorization was given today. This has been reviewed, questions answered, and verbal consent given by Patient for injection(s) and administration of COVID-19 Immunization Pfizer COVID-19 and Influenza (Inactivated).      Patient tolerated without incident. See immunization grid for documentation.        
10.1   11.86 )-----------( 168      ( 2019 22:50 )             30.6           141  |  108  |  87<HH>  ----------------------------<  142<H>  5.3<H>   |  19  |  1.8<H>    Ca    9.3      2019 22:50  Mg     2.3         TPro  6.3  /  Alb  4.0  /  TBili  0.3  /  DBili  x   /  AST  10  /  ALT  10  /  AlkPhos  64      CARDIAC MARKERS ( 2019 22:50 )  x     / <0.01 ng/mL / x     / x     / x        Blood Gas Profile - Venous (19 @ 23:11)    pH, Venous: 7.30    pCO2, Venous: 44 mmHg    pO2, Venous: 14 mmHg    HCO3, Venous: 22 mmoL/L    Base Excess, Venous: -4.7 mmoL/L    Oxygen Saturation, Venous: 15 %    < from: CT Head No Cont (06.10.19 @ 00:24) >      IMPRESSION:       No evidence of intracranial hemorrhage, territorial infarct, or mass   effect.    < end of copied text >    Urinalysis Basic - ( 10 Ronnell 2019 00:00 )    Color: Yellow / Appearance: Clear / S.020 / pH: x  Gluc: x / Ketone: Negative  / Bili: Negative / Urobili: 0.2 mg/dL   Blood: x / Protein: Negative mg/dL / Nitrite: Negative   Leuk Esterase: Negative / RBC: x / WBC x   Sq Epi: x / Non Sq Epi: x / Bacteria: x    < from: Xray Chest 1 View-PORTABLE IMMEDIATE (19 @ 23:32) >      IMPRESSION:        Biapical pleural thickening, better seen on recent CT chest.    < end of copied text >

## 2022-02-27 ENCOUNTER — RX RENEWAL (OUTPATIENT)
Age: 87
End: 2022-02-27

## 2022-03-23 NOTE — PHYSICAL THERAPY INITIAL EVALUATION ADULT - MARITAL STATUS
Covid 19 screening done. Compliant with mask wearing and social distancing.   +girlfriend present bedside

## 2022-05-26 ENCOUNTER — EMERGENCY (EMERGENCY)
Facility: HOSPITAL | Age: 87
LOS: 0 days | Discharge: HOME | End: 2022-05-26
Attending: EMERGENCY MEDICINE | Admitting: EMERGENCY MEDICINE
Payer: MEDICARE

## 2022-05-26 VITALS
HEIGHT: 69 IN | RESPIRATION RATE: 16 BRPM | TEMPERATURE: 98 F | OXYGEN SATURATION: 97 % | SYSTOLIC BLOOD PRESSURE: 136 MMHG | DIASTOLIC BLOOD PRESSURE: 64 MMHG | HEART RATE: 59 BPM | WEIGHT: 139.99 LBS

## 2022-05-26 DIAGNOSIS — Z96.659 PRESENCE OF UNSPECIFIED ARTIFICIAL KNEE JOINT: Chronic | ICD-10-CM

## 2022-05-26 DIAGNOSIS — F03.90 UNSPECIFIED DEMENTIA WITHOUT BEHAVIORAL DISTURBANCE: ICD-10-CM

## 2022-05-26 DIAGNOSIS — Z98.890 OTHER SPECIFIED POSTPROCEDURAL STATES: Chronic | ICD-10-CM

## 2022-05-26 DIAGNOSIS — Z20.822 CONTACT WITH AND (SUSPECTED) EXPOSURE TO COVID-19: ICD-10-CM

## 2022-05-26 DIAGNOSIS — M54.50 LOW BACK PAIN, UNSPECIFIED: ICD-10-CM

## 2022-05-26 DIAGNOSIS — K21.9 GASTRO-ESOPHAGEAL REFLUX DISEASE WITHOUT ESOPHAGITIS: ICD-10-CM
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
## 2022-05-26 DIAGNOSIS — Z87.19 PERSONAL HISTORY OF OTHER DISEASES OF THE DIGESTIVE SYSTEM: ICD-10-CM

## 2022-05-26 DIAGNOSIS — Z79.82 LONG TERM (CURRENT) USE OF ASPIRIN: ICD-10-CM

## 2022-05-26 DIAGNOSIS — Z95.1 PRESENCE OF AORTOCORONARY BYPASS GRAFT: ICD-10-CM

## 2022-05-26 DIAGNOSIS — N40.0 BENIGN PROSTATIC HYPERPLASIA WITHOUT LOWER URINARY TRACT SYMPTOMS: ICD-10-CM

## 2022-05-26 DIAGNOSIS — I25.10 ATHEROSCLEROTIC HEART DISEASE OF NATIVE CORONARY ARTERY WITHOUT ANGINA PECTORIS: ICD-10-CM

## 2022-05-26 DIAGNOSIS — M79.622 PAIN IN LEFT UPPER ARM: ICD-10-CM

## 2022-05-26 DIAGNOSIS — Z95.1 PRESENCE OF AORTOCORONARY BYPASS GRAFT: Chronic | ICD-10-CM

## 2022-05-26 DIAGNOSIS — I10 ESSENTIAL (PRIMARY) HYPERTENSION: ICD-10-CM

## 2022-05-26 DIAGNOSIS — Z96.659 PRESENCE OF UNSPECIFIED ARTIFICIAL KNEE JOINT: ICD-10-CM

## 2022-05-26 DIAGNOSIS — R53.1 WEAKNESS: ICD-10-CM
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
## 2022-05-26 DIAGNOSIS — E78.5 HYPERLIPIDEMIA, UNSPECIFIED: ICD-10-CM

## 2022-05-26 LAB
ALBUMIN SERPL ELPH-MCNC: 3.9 G/DL — SIGNIFICANT CHANGE UP (ref 3.5–5.2)
ALP SERPL-CCNC: 67 U/L — SIGNIFICANT CHANGE UP (ref 30–115)
ALT FLD-CCNC: 7 U/L — SIGNIFICANT CHANGE UP (ref 0–41)
ANION GAP SERPL CALC-SCNC: 15 MMOL/L — HIGH (ref 7–14)
APPEARANCE UR: CLEAR — SIGNIFICANT CHANGE UP
AST SERPL-CCNC: 11 U/L — SIGNIFICANT CHANGE UP (ref 0–41)
BACTERIA # UR AUTO: NEGATIVE — SIGNIFICANT CHANGE UP
BASOPHILS # BLD AUTO: 0.02 K/UL — SIGNIFICANT CHANGE UP (ref 0–0.2)
BASOPHILS NFR BLD AUTO: 0.3 % — SIGNIFICANT CHANGE UP (ref 0–1)
BILIRUB SERPL-MCNC: 0.3 MG/DL — SIGNIFICANT CHANGE UP (ref 0.2–1.2)
BILIRUB UR-MCNC: NEGATIVE — SIGNIFICANT CHANGE UP
BUN SERPL-MCNC: 32 MG/DL — HIGH (ref 10–20)
CALCIUM SERPL-MCNC: 9.2 MG/DL — SIGNIFICANT CHANGE UP (ref 8.5–10.1)
CHLORIDE SERPL-SCNC: 105 MMOL/L — SIGNIFICANT CHANGE UP (ref 98–110)
CO2 SERPL-SCNC: 21 MMOL/L — SIGNIFICANT CHANGE UP (ref 17–32)
COLOR SPEC: SIGNIFICANT CHANGE UP
CREAT SERPL-MCNC: 1.9 MG/DL — HIGH (ref 0.7–1.5)
DIFF PNL FLD: NEGATIVE — SIGNIFICANT CHANGE UP
EGFR: 33 ML/MIN/1.73M2 — LOW
EOSINOPHIL # BLD AUTO: 0.16 K/UL — SIGNIFICANT CHANGE UP (ref 0–0.7)
EOSINOPHIL NFR BLD AUTO: 2.7 % — SIGNIFICANT CHANGE UP (ref 0–8)
EPI CELLS # UR: 1 /HPF — SIGNIFICANT CHANGE UP (ref 0–5)
FLUAV AG NPH QL: SIGNIFICANT CHANGE UP
FLUBV AG NPH QL: SIGNIFICANT CHANGE UP
GLUCOSE SERPL-MCNC: 154 MG/DL — HIGH (ref 70–99)
GLUCOSE UR QL: NEGATIVE — SIGNIFICANT CHANGE UP
HCT VFR BLD CALC: 33.6 % — LOW (ref 42–52)
HGB BLD-MCNC: 11.2 G/DL — LOW (ref 14–18)
HYALINE CASTS # UR AUTO: 1 /LPF — SIGNIFICANT CHANGE UP (ref 0–7)
IMM GRANULOCYTES NFR BLD AUTO: 0.7 % — HIGH (ref 0.1–0.3)
KETONES UR-MCNC: NEGATIVE — SIGNIFICANT CHANGE UP
LEUKOCYTE ESTERASE UR-ACNC: NEGATIVE — SIGNIFICANT CHANGE UP
LYMPHOCYTES # BLD AUTO: 1.68 K/UL — SIGNIFICANT CHANGE UP (ref 1.2–3.4)
LYMPHOCYTES # BLD AUTO: 28.8 % — SIGNIFICANT CHANGE UP (ref 20.5–51.1)
MAGNESIUM SERPL-MCNC: 2.1 MG/DL — SIGNIFICANT CHANGE UP (ref 1.8–2.4)
MCHC RBC-ENTMCNC: 32.7 PG — HIGH (ref 27–31)
MCHC RBC-ENTMCNC: 33.3 G/DL — SIGNIFICANT CHANGE UP (ref 32–37)
MCV RBC AUTO: 98 FL — HIGH (ref 80–94)
MONOCYTES # BLD AUTO: 0.45 K/UL — SIGNIFICANT CHANGE UP (ref 0.1–0.6)
MONOCYTES NFR BLD AUTO: 7.7 % — SIGNIFICANT CHANGE UP (ref 1.7–9.3)
NEUTROPHILS # BLD AUTO: 3.49 K/UL — SIGNIFICANT CHANGE UP (ref 1.4–6.5)
NEUTROPHILS NFR BLD AUTO: 59.8 % — SIGNIFICANT CHANGE UP (ref 42.2–75.2)
NITRITE UR-MCNC: NEGATIVE — SIGNIFICANT CHANGE UP
NRBC # BLD: 0 /100 WBCS — SIGNIFICANT CHANGE UP (ref 0–0)
PH UR: 7 — SIGNIFICANT CHANGE UP (ref 5–8)
PLATELET # BLD AUTO: 170 K/UL — SIGNIFICANT CHANGE UP (ref 130–400)
POTASSIUM SERPL-MCNC: 4.3 MMOL/L — SIGNIFICANT CHANGE UP (ref 3.5–5)
POTASSIUM SERPL-SCNC: 4.3 MMOL/L — SIGNIFICANT CHANGE UP (ref 3.5–5)
PROT SERPL-MCNC: 6.1 G/DL — SIGNIFICANT CHANGE UP (ref 6–8)
PROT UR-MCNC: ABNORMAL
RBC # BLD: 3.43 M/UL — LOW (ref 4.7–6.1)
RBC # FLD: 14.3 % — SIGNIFICANT CHANGE UP (ref 11.5–14.5)
RBC CASTS # UR COMP ASSIST: 3 /HPF — SIGNIFICANT CHANGE UP (ref 0–4)
RSV RNA NPH QL NAA+NON-PROBE: SIGNIFICANT CHANGE UP
SARS-COV-2 RNA SPEC QL NAA+PROBE: SIGNIFICANT CHANGE UP
SODIUM SERPL-SCNC: 141 MMOL/L — SIGNIFICANT CHANGE UP (ref 135–146)
SP GR SPEC: 1.02 — SIGNIFICANT CHANGE UP (ref 1.01–1.03)
TROPONIN T SERPL-MCNC: <0.01 NG/ML — SIGNIFICANT CHANGE UP
UROBILINOGEN FLD QL: SIGNIFICANT CHANGE UP
WBC # BLD: 5.84 K/UL — SIGNIFICANT CHANGE UP (ref 4.8–10.8)
WBC # FLD AUTO: 5.84 K/UL — SIGNIFICANT CHANGE UP (ref 4.8–10.8)
WBC UR QL: 3 /HPF — SIGNIFICANT CHANGE UP (ref 0–5)

## 2022-05-26 PROCEDURE — 93010 ELECTROCARDIOGRAM REPORT: CPT

## 2022-05-26 PROCEDURE — 72170 X-RAY EXAM OF PELVIS: CPT | Mod: 26

## 2022-05-26 PROCEDURE — 99284 EMERGENCY DEPT VISIT MOD MDM: CPT | Mod: FS

## 2022-05-26 PROCEDURE — 71045 X-RAY EXAM CHEST 1 VIEW: CPT | Mod: 26

## 2022-05-26 RX ORDER — SODIUM CHLORIDE 9 MG/ML
500 INJECTION INTRAMUSCULAR; INTRAVENOUS; SUBCUTANEOUS ONCE
Refills: 0 | Status: COMPLETED | OUTPATIENT
Start: 2022-05-26 | End: 2022-05-26

## 2022-05-26 RX ADMIN — SODIUM CHLORIDE 500 MILLILITER(S): 9 INJECTION INTRAMUSCULAR; INTRAVENOUS; SUBCUTANEOUS at 20:03

## 2022-05-26 NOTE — ED ADULT NURSE NOTE - SKIN CAPILLARY REFILL
-- Message is from the [facility]--    Reason for Call: Patient called in to schedule appointment with Dr. Manuel Chappell [facility] can not schedule. Please assist patient with scheduling as soon as possible.     Caller Information       Type Contact Phone    08/05/2019 09:57 AM Phone (Incoming) Guerda Hair (Self) [phone] (H)          Alternative phone number: N/A    Turnaround time given to caller:   \"This message will be sent to [state Provider's name]. The clinical team will fulfill your request as soon as they review your message.\"     2 seconds or less

## 2022-05-26 NOTE — ED PROVIDER NOTE - PATIENT PORTAL LINK FT
You can access the FollowMyHealth Patient Portal offered by [facility] by registering at the following website: [email or web address]. By joining [facility]’s FollowMyHealth portal, you will also be able to view your health information using other applications (apps) compatible with our system.

## 2022-05-26 NOTE — ED ADULT TRIAGE NOTE - CHIEF COMPLAINT QUOTE
Left arm pain since last night. As per family pt fell last thursday resulting in back pain. As per family pt has been very fatigued recently.

## 2022-05-26 NOTE — ED PROVIDER NOTE - OBJECTIVE STATEMENT
88 yo male hx of HTN/CAD s/p CABG/Dementia present with son c/o generalized weakness for about 1 week s/p fall 1 week ago. also c/o off/on left upper arm pain. son reported patient was walking and fell on his buttock. denies head injury or LOC. patient denies HA/nausea/vomiting/chest pain/abd pain vomiting and diarrhea. son reported patient was eating and drinking well over the past.

## 2022-05-26 NOTE — ED PROVIDER NOTE - PHYSICAL EXAMINATION
CONSTITUTIONAL: Elderly male; in no apparent distress.   EYES: PERRL; EOM intact.   CARDIOVASCULAR: Normal S1, S2; no murmurs, rubs, or gallops.   RESPIRATORY: Normal chest excursion with respiration; breath sounds clear and equal bilaterally; no wheezes, rhonchi, or rales.  GI/: Normal bowel sounds; non-distended; non-tender; no palpable organomegaly.   MS: No midline tenderness to neck/back. left shoulder/elbow with FROM. left hand nv intact.   SKIN: No ecchymosis/erythema to back/scalp  NEURO/PSYCH: A & Oriented to son and place;  follow commands and move all extremities.

## 2022-05-26 NOTE — ED PROVIDER NOTE - ATTENDING CONTRIBUTION TO CARE
87 y.o. male, PMH of dementia, CAD s/p CABG, GERD, HLD, melanoma BIB son for evaluation. Pt had mechanical fall 1 wk ago, where he fell on his back. Since fall, has been c/o LBP and yesterday mentioned that his left shoulder hurts. Pt has been ambulating without difficulty. No change in mental status, demented at baseline. No CP/SOB/abdominal pain. No urinary symptoms. No fever/chills. No rash. On exam, pt in NAD, Awake, demented, head NC/AT, CN II-XII intact, neck (-) midline tenderness, lungs CTA B/L, CV S1S2 regular, chest (-) TTP over shoulders/humerus, FROM of b/l shoulders, abdomen soft/NT/ND/(+)BS, back (-) midline tenderness, pelvis stable, ext (-) edema/deformity, FROM of b/l hips/knees/ankles. Labs/XR/UA done and reviewed. Pt ambulated without difficulty. Son at bedside wants to take pt home. Will d/c. Advised to return for worsening of symptoms.

## 2022-05-29 LAB
-  AMIKACIN: SIGNIFICANT CHANGE UP
-  AMOXICILLIN/CLAVULANIC ACID: SIGNIFICANT CHANGE UP
-  AMPICILLIN/SULBACTAM: SIGNIFICANT CHANGE UP
-  AMPICILLIN: SIGNIFICANT CHANGE UP
-  AZTREONAM: SIGNIFICANT CHANGE UP
-  CEFAZOLIN: SIGNIFICANT CHANGE UP
-  CEFEPIME: SIGNIFICANT CHANGE UP
-  CEFOXITIN: SIGNIFICANT CHANGE UP
-  CEFTRIAXONE: SIGNIFICANT CHANGE UP
-  CIPROFLOXACIN: SIGNIFICANT CHANGE UP
-  ERTAPENEM: SIGNIFICANT CHANGE UP
-  GENTAMICIN: SIGNIFICANT CHANGE UP
-  LEVOFLOXACIN: SIGNIFICANT CHANGE UP
-  MEROPENEM: SIGNIFICANT CHANGE UP
-  NITROFURANTOIN: SIGNIFICANT CHANGE UP
-  PIPERACILLIN/TAZOBACTAM: SIGNIFICANT CHANGE UP
-  TOBRAMYCIN: SIGNIFICANT CHANGE UP
-  TRIMETHOPRIM/SULFAMETHOXAZOLE: SIGNIFICANT CHANGE UP
CULTURE RESULTS: SIGNIFICANT CHANGE UP
METHOD TYPE: SIGNIFICANT CHANGE UP
ORGANISM # SPEC MICROSCOPIC CNT: SIGNIFICANT CHANGE UP
ORGANISM # SPEC MICROSCOPIC CNT: SIGNIFICANT CHANGE UP
SPECIMEN SOURCE: SIGNIFICANT CHANGE UP

## 2022-05-30 RX ORDER — CEFPODOXIME PROXETIL 100 MG
1 TABLET ORAL
Qty: 14 | Refills: 0
Start: 2022-05-30 | End: 2022-06-05

## 2022-05-31 ENCOUNTER — RX RENEWAL (OUTPATIENT)
Age: 87
End: 2022-05-31

## 2022-08-29 ENCOUNTER — RX RENEWAL (OUTPATIENT)
Age: 87
End: 2022-08-29

## 2022-11-29 ENCOUNTER — RX RENEWAL (OUTPATIENT)
Age: 87
End: 2022-11-29

## 2022-11-29 RX ORDER — TAMSULOSIN HYDROCHLORIDE 0.4 MG/1
0.4 CAPSULE ORAL
Qty: 90 | Refills: 0 | Status: ACTIVE | COMMUNITY
Start: 2019-08-16 | End: 1900-01-01

## 2023-05-04 NOTE — ED ADULT NURSE NOTE - AS SC BRADEN NUTRITION
BIBA pt states he woke up because he had right shoulder pain, denies CP, states he has mild sob, talks in complete sentences and breathing at normal rate, no distress, pt has high blood pressure
(3) adequate

## 2023-06-02 NOTE — CONSULT NOTE ADULT - PROVIDER SPECIALTY LIST ADULT
Heme/Onc Mirvaso Counseling: Mirvaso is a topical medication which can decrease superficial blood flow where applied. Side effects are uncommon and include stinging, redness and allergic reactions.

## 2023-09-20 NOTE — SWALLOW BEDSIDE ASSESSMENT ADULT - ORAL PHASE
Patient came because she was having blue tooth issues, but while in the waiting room she says it has fixed itself. She will call if she has anymore problems.  
Within functional limits
Within functional limits

## 2024-09-19 NOTE — ED ADULT NURSE NOTE - GASTROINTESTINAL WDL
Abdomen soft, nontender, nondistended, bowel sounds present in all 4 quadrants.

## 2025-01-07 NOTE — ED ADULT NURSE NOTE - NSSISCREENINGQ2_ED_A_ED
January 7, 2025      Carlos A Crespo  [id number] [facility] [id number]        To Whom It May Concern:    Carlos A Crepso was seen in our clinic. He may return to work on 1/13/25 with the following: limited to light duty - lifting no greater than 1 pounds  with the right arm for 6 weeks at which point he will be re-evaluated.        Sincerely,        Sterling Marin      Electronically signed       No

## 2025-06-05 NOTE — ED ADULT NURSE NOTE - CAS TRG GEN SKIN CONDITION
Warm